# Patient Record
Sex: MALE | Race: WHITE | NOT HISPANIC OR LATINO
[De-identification: names, ages, dates, MRNs, and addresses within clinical notes are randomized per-mention and may not be internally consistent; named-entity substitution may affect disease eponyms.]

---

## 2017-03-04 ENCOUNTER — RX RENEWAL (OUTPATIENT)
Age: 72
End: 2017-03-04

## 2017-04-01 ENCOUNTER — RX RENEWAL (OUTPATIENT)
Age: 72
End: 2017-04-01

## 2017-05-05 ENCOUNTER — RX RENEWAL (OUTPATIENT)
Age: 72
End: 2017-05-05

## 2017-06-21 ENCOUNTER — OUTPATIENT (OUTPATIENT)
Dept: OUTPATIENT SERVICES | Facility: HOSPITAL | Age: 72
LOS: 1 days | End: 2017-06-21
Payer: MEDICARE

## 2017-06-21 ENCOUNTER — APPOINTMENT (OUTPATIENT)
Dept: UROLOGY | Facility: CLINIC | Age: 72
End: 2017-06-21

## 2017-06-21 DIAGNOSIS — R35.0 FREQUENCY OF MICTURITION: ICD-10-CM

## 2017-06-21 LAB
APPEARANCE: CLEAR
BACTERIA: NEGATIVE
BILIRUBIN URINE: NEGATIVE
BLOOD URINE: NEGATIVE
COLOR: YELLOW
GLUCOSE QUALITATIVE U: NORMAL MG/DL
HYALINE CASTS: 0 /LPF
KETONES URINE: NEGATIVE
LEUKOCYTE ESTERASE URINE: NEGATIVE
MICROSCOPIC-UA: NORMAL
NITRITE URINE: NEGATIVE
PH URINE: 6
PROTEIN URINE: NEGATIVE MG/DL
RED BLOOD CELLS URINE: 3 /HPF
SPECIFIC GRAVITY URINE: 1.02
SQUAMOUS EPITHELIAL CELLS: 1 /HPF
UROBILINOGEN URINE: NORMAL MG/DL
WHITE BLOOD CELLS URINE: 1 /HPF

## 2017-06-21 PROCEDURE — 52000 CYSTOURETHROSCOPY: CPT

## 2017-06-22 DIAGNOSIS — N40.1 BENIGN PROSTATIC HYPERPLASIA WITH LOWER URINARY TRACT SYMPTOMS: ICD-10-CM

## 2017-06-22 DIAGNOSIS — C67.9 MALIGNANT NEOPLASM OF BLADDER, UNSPECIFIED: ICD-10-CM

## 2017-06-22 LAB
PSA FREE FLD-MCNC: 16.2
PSA FREE SERPL-MCNC: 0.32 NG/ML
PSA SERPL-MCNC: 1.97 NG/ML

## 2017-06-24 LAB — CORE LAB FLUID CYTOLOGY: NORMAL

## 2018-01-03 ENCOUNTER — APPOINTMENT (OUTPATIENT)
Dept: UROLOGY | Facility: CLINIC | Age: 73
End: 2018-01-03
Payer: MEDICARE

## 2018-01-03 ENCOUNTER — OUTPATIENT (OUTPATIENT)
Dept: OUTPATIENT SERVICES | Facility: HOSPITAL | Age: 73
LOS: 1 days | End: 2018-01-03
Payer: MEDICARE

## 2018-01-03 DIAGNOSIS — R35.0 FREQUENCY OF MICTURITION: ICD-10-CM

## 2018-01-03 LAB
APPEARANCE: CLEAR
BACTERIA: NEGATIVE
BILIRUBIN URINE: NEGATIVE
BLOOD URINE: NEGATIVE
COLOR: YELLOW
GLUCOSE QUALITATIVE U: NEGATIVE MG/DL
HYALINE CASTS: 1 /LPF
KETONES URINE: NEGATIVE
LEUKOCYTE ESTERASE URINE: NEGATIVE
MICROSCOPIC-UA: NORMAL
NITRITE URINE: NEGATIVE
PH URINE: 5.5
PROTEIN URINE: NEGATIVE MG/DL
RED BLOOD CELLS URINE: 2 /HPF
SPECIFIC GRAVITY URINE: 1.02
SQUAMOUS EPITHELIAL CELLS: 2 /HPF
UROBILINOGEN URINE: NEGATIVE MG/DL
WHITE BLOOD CELLS URINE: 1 /HPF

## 2018-01-03 PROCEDURE — 88112 CYTOPATH CELL ENHANCE TECH: CPT | Mod: 26

## 2018-01-03 PROCEDURE — 99213 OFFICE O/P EST LOW 20 MIN: CPT | Mod: 25

## 2018-01-03 PROCEDURE — 52000 CYSTOURETHROSCOPY: CPT

## 2018-01-05 LAB — CORE LAB FLUID CYTOLOGY: NORMAL

## 2018-01-08 DIAGNOSIS — C67.9 MALIGNANT NEOPLASM OF BLADDER, UNSPECIFIED: ICD-10-CM

## 2018-01-08 DIAGNOSIS — N40.1 BENIGN PROSTATIC HYPERPLASIA WITH LOWER URINARY TRACT SYMPTOMS: ICD-10-CM

## 2018-05-31 ENCOUNTER — RX RENEWAL (OUTPATIENT)
Age: 73
End: 2018-05-31

## 2018-06-13 ENCOUNTER — APPOINTMENT (OUTPATIENT)
Dept: UROLOGY | Facility: CLINIC | Age: 73
End: 2018-06-13
Payer: MEDICARE

## 2018-06-13 ENCOUNTER — OUTPATIENT (OUTPATIENT)
Dept: OUTPATIENT SERVICES | Facility: HOSPITAL | Age: 73
LOS: 1 days | End: 2018-06-13
Payer: MEDICARE

## 2018-06-13 VITALS
TEMPERATURE: 97.7 F | DIASTOLIC BLOOD PRESSURE: 49 MMHG | RESPIRATION RATE: 18 BRPM | SYSTOLIC BLOOD PRESSURE: 180 MMHG | HEART RATE: 81 BPM

## 2018-06-13 DIAGNOSIS — Z00.00 ENCOUNTER FOR GENERAL ADULT MEDICAL EXAMINATION W/OUT ABNORMAL FINDINGS: ICD-10-CM

## 2018-06-13 DIAGNOSIS — R35.0 FREQUENCY OF MICTURITION: ICD-10-CM

## 2018-06-13 LAB
APPEARANCE: CLEAR
BACTERIA: NEGATIVE
BILIRUBIN URINE: NEGATIVE
BLOOD URINE: NEGATIVE
COLOR: YELLOW
GLUCOSE QUALITATIVE U: NEGATIVE MG/DL
KETONES URINE: NEGATIVE
LEUKOCYTE ESTERASE URINE: NEGATIVE
MICROSCOPIC-UA: NORMAL
NITRITE URINE: NEGATIVE
PH URINE: 6
PROTEIN URINE: NEGATIVE MG/DL
RED BLOOD CELLS URINE: 1 /HPF
SPECIFIC GRAVITY URINE: 1.02
SQUAMOUS EPITHELIAL CELLS: 1 /HPF
UROBILINOGEN URINE: NEGATIVE MG/DL
WHITE BLOOD CELLS URINE: 2 /HPF

## 2018-06-13 PROCEDURE — 99214 OFFICE O/P EST MOD 30 MIN: CPT | Mod: 25

## 2018-06-13 PROCEDURE — 52000 CYSTOURETHROSCOPY: CPT

## 2018-06-14 LAB
CORE LAB FLUID CYTOLOGY: NORMAL
PSA FREE FLD-MCNC: 14.8
PSA FREE SERPL-MCNC: 0.36 NG/ML
PSA SERPL-MCNC: 2.44 NG/ML

## 2018-06-18 DIAGNOSIS — N40.1 BENIGN PROSTATIC HYPERPLASIA WITH LOWER URINARY TRACT SYMPTOMS: ICD-10-CM

## 2018-06-18 DIAGNOSIS — C67.9 MALIGNANT NEOPLASM OF BLADDER, UNSPECIFIED: ICD-10-CM

## 2018-07-28 ENCOUNTER — RX RENEWAL (OUTPATIENT)
Age: 73
End: 2018-07-28

## 2018-08-02 ENCOUNTER — APPOINTMENT (OUTPATIENT)
Dept: CARDIOLOGY | Facility: CLINIC | Age: 73
End: 2018-08-02
Payer: MEDICARE

## 2018-08-02 ENCOUNTER — NON-APPOINTMENT (OUTPATIENT)
Age: 73
End: 2018-08-02

## 2018-08-02 VITALS
DIASTOLIC BLOOD PRESSURE: 60 MMHG | HEIGHT: 68 IN | SYSTOLIC BLOOD PRESSURE: 144 MMHG | WEIGHT: 222 LBS | BODY MASS INDEX: 33.65 KG/M2 | TEMPERATURE: 97.9 F | OXYGEN SATURATION: 95 % | HEART RATE: 67 BPM | RESPIRATION RATE: 14 BRPM

## 2018-08-02 DIAGNOSIS — R06.09 OTHER FORMS OF DYSPNEA: ICD-10-CM

## 2018-08-02 PROCEDURE — 99214 OFFICE O/P EST MOD 30 MIN: CPT | Mod: 25

## 2018-08-02 PROCEDURE — 93000 ELECTROCARDIOGRAM COMPLETE: CPT

## 2018-08-06 ENCOUNTER — APPOINTMENT (OUTPATIENT)
Dept: CARDIOLOGY | Facility: CLINIC | Age: 73
End: 2018-08-06

## 2018-08-17 ENCOUNTER — NON-APPOINTMENT (OUTPATIENT)
Age: 73
End: 2018-08-17

## 2018-09-26 ENCOUNTER — APPOINTMENT (OUTPATIENT)
Dept: CARDIOLOGY | Facility: CLINIC | Age: 73
End: 2018-09-26

## 2018-09-29 ENCOUNTER — RX RENEWAL (OUTPATIENT)
Age: 73
End: 2018-09-29

## 2018-12-12 ENCOUNTER — APPOINTMENT (OUTPATIENT)
Dept: UROLOGY | Facility: CLINIC | Age: 73
End: 2018-12-12
Payer: MEDICARE

## 2018-12-12 ENCOUNTER — OUTPATIENT (OUTPATIENT)
Dept: OUTPATIENT SERVICES | Facility: HOSPITAL | Age: 73
LOS: 1 days | End: 2018-12-12
Payer: MEDICARE

## 2018-12-12 DIAGNOSIS — R35.0 FREQUENCY OF MICTURITION: ICD-10-CM

## 2018-12-12 PROCEDURE — 99213 OFFICE O/P EST LOW 20 MIN: CPT | Mod: 25

## 2018-12-12 PROCEDURE — 52000 CYSTOURETHROSCOPY: CPT

## 2018-12-13 DIAGNOSIS — C67.9 MALIGNANT NEOPLASM OF BLADDER, UNSPECIFIED: ICD-10-CM

## 2018-12-13 DIAGNOSIS — N40.1 BENIGN PROSTATIC HYPERPLASIA WITH LOWER URINARY TRACT SYMPTOMS: ICD-10-CM

## 2018-12-13 LAB
APPEARANCE: CLEAR
BACTERIA: NEGATIVE
BILIRUBIN URINE: NEGATIVE
BLOOD URINE: NEGATIVE
COLOR: YELLOW
GLUCOSE QUALITATIVE U: NEGATIVE MG/DL
HYALINE CASTS: 0 /LPF
KETONES URINE: NEGATIVE
LEUKOCYTE ESTERASE URINE: NEGATIVE
MICROSCOPIC-UA: NORMAL
NITRITE URINE: NEGATIVE
PH URINE: 6
PROTEIN URINE: NEGATIVE MG/DL
RED BLOOD CELLS URINE: 2 /HPF
SPECIFIC GRAVITY URINE: 1.02
SQUAMOUS EPITHELIAL CELLS: 0 /HPF
UROBILINOGEN URINE: NEGATIVE MG/DL
WHITE BLOOD CELLS URINE: 1 /HPF

## 2018-12-20 LAB — CORE LAB FLUID CYTOLOGY: NORMAL

## 2019-05-10 ENCOUNTER — RX RENEWAL (OUTPATIENT)
Age: 74
End: 2019-05-10

## 2019-05-29 ENCOUNTER — EMERGENCY (EMERGENCY)
Facility: HOSPITAL | Age: 74
LOS: 1 days | Discharge: HOME | End: 2019-05-29
Attending: EMERGENCY MEDICINE
Payer: MEDICARE

## 2019-05-29 VITALS
RESPIRATION RATE: 18 BRPM | HEART RATE: 87 BPM | OXYGEN SATURATION: 100 % | SYSTOLIC BLOOD PRESSURE: 122 MMHG | DIASTOLIC BLOOD PRESSURE: 67 MMHG

## 2019-05-29 VITALS
SYSTOLIC BLOOD PRESSURE: 167 MMHG | HEIGHT: 68 IN | DIASTOLIC BLOOD PRESSURE: 77 MMHG | HEART RATE: 93 BPM | OXYGEN SATURATION: 100 % | TEMPERATURE: 98 F | WEIGHT: 220.02 LBS | RESPIRATION RATE: 18 BRPM

## 2019-05-29 LAB
ALBUMIN SERPL ELPH-MCNC: 4.7 G/DL — SIGNIFICANT CHANGE UP (ref 3.3–5)
ALP SERPL-CCNC: 101 U/L — SIGNIFICANT CHANGE UP (ref 40–120)
ALT FLD-CCNC: 36 U/L — SIGNIFICANT CHANGE UP (ref 10–45)
ANION GAP SERPL CALC-SCNC: 13 MMOL/L — SIGNIFICANT CHANGE UP (ref 5–17)
APTT BLD: 26.8 SEC — LOW (ref 27.5–36.3)
AST SERPL-CCNC: 30 U/L — SIGNIFICANT CHANGE UP (ref 10–40)
BASOPHILS # BLD AUTO: 0 K/UL — SIGNIFICANT CHANGE UP (ref 0–0.2)
BASOPHILS NFR BLD AUTO: 0.1 % — SIGNIFICANT CHANGE UP (ref 0–2)
BILIRUB SERPL-MCNC: 0.6 MG/DL — SIGNIFICANT CHANGE UP (ref 0.2–1.2)
BUN SERPL-MCNC: 15 MG/DL — SIGNIFICANT CHANGE UP (ref 7–23)
CALCIUM SERPL-MCNC: 9.9 MG/DL — SIGNIFICANT CHANGE UP (ref 8.4–10.5)
CHLORIDE SERPL-SCNC: 98 MMOL/L — SIGNIFICANT CHANGE UP (ref 96–108)
CO2 SERPL-SCNC: 26 MMOL/L — SIGNIFICANT CHANGE UP (ref 22–31)
CREAT SERPL-MCNC: 1.14 MG/DL — SIGNIFICANT CHANGE UP (ref 0.5–1.3)
EOSINOPHIL # BLD AUTO: 0.2 K/UL — SIGNIFICANT CHANGE UP (ref 0–0.5)
EOSINOPHIL NFR BLD AUTO: 2.4 % — SIGNIFICANT CHANGE UP (ref 0–6)
GLUCOSE SERPL-MCNC: 99 MG/DL — SIGNIFICANT CHANGE UP (ref 70–99)
HCT VFR BLD CALC: 48.5 % — SIGNIFICANT CHANGE UP (ref 39–50)
HGB BLD-MCNC: 15.8 G/DL — SIGNIFICANT CHANGE UP (ref 13–17)
INR BLD: 1.05 RATIO — SIGNIFICANT CHANGE UP (ref 0.88–1.16)
LYMPHOCYTES # BLD AUTO: 4 K/UL — HIGH (ref 1–3.3)
LYMPHOCYTES # BLD AUTO: 41.4 % — SIGNIFICANT CHANGE UP (ref 13–44)
MCHC RBC-ENTMCNC: 30.2 PG — SIGNIFICANT CHANGE UP (ref 27–34)
MCHC RBC-ENTMCNC: 32.5 GM/DL — SIGNIFICANT CHANGE UP (ref 32–36)
MCV RBC AUTO: 92.8 FL — SIGNIFICANT CHANGE UP (ref 80–100)
MONOCYTES # BLD AUTO: 0.7 K/UL — SIGNIFICANT CHANGE UP (ref 0–0.9)
MONOCYTES NFR BLD AUTO: 6.9 % — SIGNIFICANT CHANGE UP (ref 2–14)
NEUTROPHILS # BLD AUTO: 4.8 K/UL — SIGNIFICANT CHANGE UP (ref 1.8–7.4)
NEUTROPHILS NFR BLD AUTO: 49.2 % — SIGNIFICANT CHANGE UP (ref 43–77)
PLATELET # BLD AUTO: 279 K/UL — SIGNIFICANT CHANGE UP (ref 150–400)
POTASSIUM SERPL-MCNC: 4 MMOL/L — SIGNIFICANT CHANGE UP (ref 3.5–5.3)
POTASSIUM SERPL-SCNC: 4 MMOL/L — SIGNIFICANT CHANGE UP (ref 3.5–5.3)
PROT SERPL-MCNC: 7.9 G/DL — SIGNIFICANT CHANGE UP (ref 6–8.3)
PROTHROM AB SERPL-ACNC: 12 SEC — SIGNIFICANT CHANGE UP (ref 10–12.9)
RBC # BLD: 5.22 M/UL — SIGNIFICANT CHANGE UP (ref 4.2–5.8)
RBC # FLD: 12.3 % — SIGNIFICANT CHANGE UP (ref 10.3–14.5)
SODIUM SERPL-SCNC: 137 MMOL/L — SIGNIFICANT CHANGE UP (ref 135–145)
WBC # BLD: 9.7 K/UL — SIGNIFICANT CHANGE UP (ref 3.8–10.5)
WBC # FLD AUTO: 9.7 K/UL — SIGNIFICANT CHANGE UP (ref 3.8–10.5)

## 2019-05-29 PROCEDURE — 99284 EMERGENCY DEPT VISIT MOD MDM: CPT

## 2019-05-29 NOTE — ED PROVIDER NOTE - ATTENDING CONTRIBUTION TO CARE
I have seen and evaluated this patient with the resident.   I agree with the findings  unless other wise stated.  I have made appropriate changes in documentations where needed, After my face to face bedside evaluation, I am further  notinyo M with PMH Bladder CA (>10 years), hemochromatosis, takes 324mg ASA for carotid artery disease presenting with episode of confusion and word finding difficulty 4 days ago. Pt reports on  night he couldn't figure out how to take off his pants, then proceeded to wake wife up and couldn't find words to tell her. Wife at bedside reports patient with jumbled speech (15 minutes episode) at this time. No associated diaphoresis, CP, trouble breathing, dizziness, n/v.   Pt went to [facility] that night, had labs and imaging and signed out AMA on Monday. Denies any new episodes since leaving hospital. Wife reports patient is acting himself but "calmer." Pt reports he has f/u with cards and neuro tomorrow, however was told by other people he should get checked so came to ED. Denies any fever/chills, CP, SOB, abd pain, n/v/d, urinary symptoms, vision changes, HA, trouble walking, numbness/tingling.  Detailed exam non focal neuro exam except some delay in response to find certain words that is stable for days No carotid bruit sensory intact Heart regular lungs clear Pts eval done at other hospital with CT scans carotid dopplers labs re viewed pt on daily 325 mg aspirin Dr Victor covering for Dr Sanford contacted has appointment to see pt at 11 am tomorrow detailed return instructions d/w pt and his spouse they understand -- Jerson I have seen and evaluated this patient with theACP   I agree with the findings  unless other wise stated.  I have made appropriate changes in documentations where needed, After my face to face bedside evaluation, I am further  notinyo M with PMH Bladder CA (>10 years), hemochromatosis, takes 324mg ASA for carotid artery disease presenting with episode of confusion and word finding difficulty 4 days ago. Pt reports on  night he couldn't figure out how to take off his pants, then proceeded to wake wife up and couldn't find words to tell her. Wife at bedside reports patient with jumbled speech (15 minutes episode) at this time. No associated diaphoresis, CP, trouble breathing, dizziness, n/v.   Pt went to [facility] that night, had labs and imaging and signed out AMA on Monday. Denies any new episodes since leaving hospital. Wife reports patient is acting himself but "calmer." Pt reports he has f/u with cards and neuro tomorrow, however was told by other people he should get checked so came to ED. Denies any fever/chills, CP, SOB, abd pain, n/v/d, urinary symptoms, vision changes, HA, trouble walking, numbness/tingling.  Detailed exam non focal neuro exam except some delay in response to find certain words that is stable for days No carotid bruit sensory intact Heart regular lungs clear Pts eval done at other hospital with CT scans carotid dopplers labs re viewed pt on daily 325 mg aspirin Dr Victor covering for Dr Sanford contacted has appointment to see pt at 11 am tomorrow detailed return instructions d/w pt and his spouse they understand -- Jerson

## 2019-05-29 NOTE — ED PROVIDER NOTE - PMH
Ex-heavy cigarette smoker (20-39 per day)    Hyperlipemia    Hypertension    Malignant neoplasm of bladder wall    Osteoarthritis

## 2019-05-29 NOTE — ED PROVIDER NOTE - CARE PROVIDER_API CALL
Deepak Sanford)  Neurology  [address], [address]  [place] [address]  Phone: [phone]  Fax: [phone]  Follow Up Time:     Pattie Haile)  Cardiovascular Disease; Interventional Cardiology  [address] [place] [address]  Phone: [phone]  Fax: [phone]  Follow Up Time: 1-3 Days

## 2019-05-29 NOTE — ED PROVIDER NOTE - PROGRESS NOTE DETAILS
Dr. Juvenal ron. - Siobhan Chavez PA-C Dr. Arthur spoke with Dr. Victor, covering for Dr. Sanford. Reviewed patient's history and results from OSH (reports provided by patient) and confirmed appointment with Dr. Sanford at 11am tomorrow. Will f/u with patient in office. - Siobhan Chavez PA-C

## 2019-05-29 NOTE — ED PROVIDER NOTE - OBJECTIVE STATEMENT
73yo M with PMH Bladder CA (>10 years), hemochromatosis, takes 324mg ASA for carotid artery disease presenting with episode of confusion and word finding difficulty 4 days ago. Pt reports on Sunday night he couldn't figure out how to take off his pants, then proceeded to wake wife up and couldn't find words to tell her. Wife at bedside reports patient with jumbled speech (15 minutes episode) at this time. No associated diaphoresis, CP, trouble breathing, dizziness, n/v.   Pt went to [facility] that night, had labs and imaging and signed out AMA on Monday. Denies any new episodes since leaving hospital. Wife reports patient is acting himself but "calmer." Pt reports he has f/u with cards and neuro tomorrow, however was told by other people he should get checked so came to ED. Denies any fever/chills, CP, SOB, abd pain, n/v/d, urinary symptoms, vision changes, HA, trouble walking, numbness/tingling.     Mak Acosta (PCP)   Dr. Haile (cardiology)  Deepak Sanford (Neuro)

## 2019-05-29 NOTE — ED ADULT NURSE REASSESSMENT NOTE - NS ED NURSE REASSESS COMMENT FT1
pt seen and evaluated by [facility] TAYLOR Curry. labs drawn and sent to the lab. 20G IV placed in R AC

## 2019-05-29 NOTE — ED PROVIDER NOTE - CLINICAL SUMMARY MEDICAL DECISION MAKING FREE TEXT BOX
73yo M with PMH Bladder CA (>10 years), hemochromatosis, takes 324mg ASA for carotid artery disease presenting with episode of confusion and word finding difficulty 4 days ago. Pt reports on Sunday night he couldn't figure out how to take off his pants, then proceeded to wake wife up and couldn't find words to tell her. Wife at bedside reports patient with jumbled speech (15 minutes episode) at this time. No associated diaphoresis, CP, trouble breathing, dizziness, n/v.   Pt went to [facility] that night, had labs and imaging and signed out AMA on Monday. Denies any new episodes since leaving hospital. Wife reports patient is acting himself but "calmer." Pt reports he has f/u with cards and neuro tomorrow, however was told by other people he should get checked so came to ED. Denies any fever/chills, CP, SOB, abd pain, n/v/d, urinary symptoms, vision changes, HA, trouble walking, numbness/tingling.  Detailed exam non focal neuro exam except some delay in response to find certain words that is stable for days No carotid bruit sensory intact Heart regular lungs clear Pts eval done at other hospital with CT scans carotid dopplers labs re viewed pt on daily 325 mg aspirin Dr Victor covering for Dr Sanford contacted has appointment to see pt at 11 am tomorrow detailed return instructions d/w pt and his spouse they understand -- Jerson

## 2019-05-29 NOTE — ED STATDOCS - OBJECTIVE STATEMENT
PMH Bladder CA (15 years), Hemochromatosis, on 324 Asprin presents to ED with increased confusion and word finding difficulty.   On Sunday night patient couldn't put on/take off clothing and had increased confusion. Additionally he couldn't find words with jumbled speech (15 minutes episode) on Sunday night. He went to the hospital that night and signed out AMA on Monday evening at [facility]. Since leaving hospital has been increasingly confused and losing words.  Denies trouble walking, denies weakness.   Mak Acosta (PCP)   Dr. Haile (cardiology)

## 2019-05-29 NOTE — ED ADULT NURSE NOTE - OBJECTIVE STATEMENT
73yo M with PMH Bladder CA (>10 years), hemochromatosis, takes 324mg ASA for carotid artery disease presenting with episode of confusion and word finding difficulty 4 days ago. Pt reports on Sunday night he couldn't figure out how to take off his pants, then proceeded to wake wife up and couldn't find words to tell her. Wife at bedside reports patient with jumbled speech (15 minutes episode) at this time. No associated diaphoresis, CP, trouble breathing, dizziness, n/v.   Pt went to [facility] that night, had labs and imaging and signed out AMA on Monday. Denies any new episodes since leaving hospital. Wife reports patient is acting himself but "calmer." Pt reports he has f/u with cards and neuro tomorrow, however was told by other people he should get checked so came to ED. Denies any fever/chills, CP, SOB, abd pain, n/v/d, urinary symptoms, vision changes, HA, trouble walking, numbness/tingling.     Mak Acosta (PCP)   Dr. Haile (cardiology)  Deepak Sanford (Neuro)

## 2019-05-29 NOTE — ED PROVIDER NOTE - NSFOLLOWUPINSTRUCTIONS_ED_ALL_ED_FT
Continue your current medication regimen.    Follow up with Cardiologist Dr. Haile, and Neurologist Dr. Sanford (11AM) at appointments tomorrow.     Follow up with your Primary Care Provider upon discharge.     Bring a copy of your test results (attached along with your discharge paperwork) with you to your appointment for further discussion, evaluation, and comparison with your prior results.    Please return to the Emergency Department immediately for any new, worsening, or concerning symptoms including increased/new confusion, dizziness, vision changes, vomiting, numbness, tingling, weakness, unsteady gait, trouble speaking, or any other concerns.

## 2019-05-30 ENCOUNTER — NON-APPOINTMENT (OUTPATIENT)
Age: 74
End: 2019-05-30

## 2019-05-30 ENCOUNTER — APPOINTMENT (OUTPATIENT)
Dept: CARDIOLOGY | Facility: CLINIC | Age: 74
End: 2019-05-30
Payer: MEDICARE

## 2019-05-30 ENCOUNTER — INPATIENT (INPATIENT)
Facility: HOSPITAL | Age: 74
LOS: 4 days | Discharge: HOME | DRG: 36 | End: 2019-06-04
Attending: PSYCHIATRY & NEUROLOGY | Admitting: PSYCHIATRY & NEUROLOGY
Payer: MEDICARE

## 2019-05-30 VITALS
TEMPERATURE: 98 F | RESPIRATION RATE: 18 BRPM | WEIGHT: 212.08 LBS | OXYGEN SATURATION: 98 % | SYSTOLIC BLOOD PRESSURE: 133 MMHG | HEIGHT: 68 IN | DIASTOLIC BLOOD PRESSURE: 69 MMHG | HEART RATE: 74 BPM

## 2019-05-30 VITALS
HEART RATE: 79 BPM | DIASTOLIC BLOOD PRESSURE: 63 MMHG | HEIGHT: 68 IN | WEIGHT: 212 LBS | BODY MASS INDEX: 32.13 KG/M2 | SYSTOLIC BLOOD PRESSURE: 115 MMHG | OXYGEN SATURATION: 97 %

## 2019-05-30 LAB
BASOPHILS # BLD AUTO: 0 K/UL — SIGNIFICANT CHANGE UP (ref 0–0.2)
BASOPHILS NFR BLD AUTO: 0.2 % — SIGNIFICANT CHANGE UP (ref 0–2)
EOSINOPHIL # BLD AUTO: 0.3 K/UL — SIGNIFICANT CHANGE UP (ref 0–0.5)
EOSINOPHIL NFR BLD AUTO: 3 % — SIGNIFICANT CHANGE UP (ref 0–6)
GAS PNL BLDV: SIGNIFICANT CHANGE UP
HCT VFR BLD CALC: 44 % — SIGNIFICANT CHANGE UP (ref 39–50)
HGB BLD-MCNC: 14.8 G/DL — SIGNIFICANT CHANGE UP (ref 13–17)
LYMPHOCYTES # BLD AUTO: 3.5 K/UL — HIGH (ref 1–3.3)
LYMPHOCYTES # BLD AUTO: 38.9 % — SIGNIFICANT CHANGE UP (ref 13–44)
MCHC RBC-ENTMCNC: 31.2 PG — SIGNIFICANT CHANGE UP (ref 27–34)
MCHC RBC-ENTMCNC: 33.7 GM/DL — SIGNIFICANT CHANGE UP (ref 32–36)
MCV RBC AUTO: 92.6 FL — SIGNIFICANT CHANGE UP (ref 80–100)
MONOCYTES # BLD AUTO: 0.7 K/UL — SIGNIFICANT CHANGE UP (ref 0–0.9)
MONOCYTES NFR BLD AUTO: 7.8 % — SIGNIFICANT CHANGE UP (ref 2–14)
NEUTROPHILS # BLD AUTO: 4.5 K/UL — SIGNIFICANT CHANGE UP (ref 1.8–7.4)
NEUTROPHILS NFR BLD AUTO: 50.1 % — SIGNIFICANT CHANGE UP (ref 43–77)
PLATELET # BLD AUTO: 252 K/UL — SIGNIFICANT CHANGE UP (ref 150–400)
RBC # BLD: 4.75 M/UL — SIGNIFICANT CHANGE UP (ref 4.2–5.8)
RBC # FLD: 12.2 % — SIGNIFICANT CHANGE UP (ref 10.3–14.5)
WBC # BLD: 9 K/UL — SIGNIFICANT CHANGE UP (ref 3.8–10.5)
WBC # FLD AUTO: 9 K/UL — SIGNIFICANT CHANGE UP (ref 3.8–10.5)

## 2019-05-30 PROCEDURE — 93000 ELECTROCARDIOGRAM COMPLETE: CPT | Mod: PD

## 2019-05-30 PROCEDURE — 99215 OFFICE O/P EST HI 40 MIN: CPT | Mod: PD

## 2019-05-30 PROCEDURE — 93010 ELECTROCARDIOGRAM REPORT: CPT

## 2019-05-30 PROCEDURE — 99285 EMERGENCY DEPT VISIT HI MDM: CPT | Mod: GC,25

## 2019-05-30 NOTE — ED PROVIDER NOTE - PHYSICAL EXAMINATION
PHYSICAL EXAM:   General: well-appearing, appears stated age, in no acute distress  HEENT: NC/AT, PERRLA, conjunctiva WNL  Cardiovascular: regular rate and rhythm, + S1/S2, no murmurs, rubs, gallops appreciated  Respiratory: clear to auscultation bilaterally, good aeration bilaterally, nonlabored respirations  Abdominal: soft, nontender, nondistended, no rebound, guarding or rigidity,   Neuro: Alert and oriented x3. CN2-12 grossly intact, Strength 5/5 in upper and lower extremities. Sensation intact to light touch in upper and lower extremities.  Gait WNL, Repetition in tact, word finding difficulties when trying to tell a story  Psychiatric: appropriate mood and affect.   Skin: appropriate color, warm, dry.  -Nancy Siddiqui PGY-1

## 2019-05-30 NOTE — ED PROVIDER NOTE - PSH
cardiac cath > 7 yrs. ago-neg-  at [facility]    H/O arthroscopy of knee- 2009    History of cystoscopy w/biopsy X 2 in 2004

## 2019-05-30 NOTE — REASON FOR VISIT
[Follow-Up - From Hospitalization] : follow-up of a recent hospitalization for [FreeTextEntry1] : TIA

## 2019-05-30 NOTE — DISCUSSION/SUMMARY
[FreeTextEntry1] : The patient is a 74-year-old gentleman history of bladder cancer, carotid artery stenosis. hypertension, hyperlipidemia s/p TIA while off aspirin.\par #1 ALEX- on aspirin, neuro today, consider LICA CEA\par #2 Htn- controlled on valsartan/hctz\par #3 Lipids- change to atorvastatin 80mg\par #4 - s/p hematuria episode, f/u Dr. Mann

## 2019-05-30 NOTE — PHYSICAL EXAM
[General Appearance - Well Developed] : well developed [Normal Appearance] : normal appearance [Well Groomed] : well groomed [General Appearance - Well Nourished] : well nourished [No Deformities] : no deformities [General Appearance - In No Acute Distress] : no acute distress [Normal Conjunctiva] : the conjunctiva exhibited no abnormalities [Eyelids - No Xanthelasma] : the eyelids demonstrated no xanthelasmas [Normal Oral Mucosa] : normal oral mucosa [No Oral Pallor] : no oral pallor [No Oral Cyanosis] : no oral cyanosis [Normal Jugular Venous A Waves Present] : normal jugular venous A waves present [Normal Jugular Venous V Waves Present] : normal jugular venous V waves present [No Jugular Venous [place] A Waves] : no jugular venous de [place] paz A waves [Respiration, Rhythm And Depth] : normal respiratory rhythm and effort [Exaggerated Use Of Accessory Muscles For Inspiration] : no accessory muscle use [Auscultation Breath Sounds / Voice Sounds] : lungs were clear to auscultation bilaterally [Heart Rate And Rhythm] : heart rate and rhythm were normal [Heart Sounds] : normal S1 and S2 [Murmurs] : no murmurs present [Abdomen Soft] : soft [Abdomen Tenderness] : non-tender [Abdomen Mass (___ Cm)] : no abdominal mass palpated [Abnormal Walk] : normal gait [Gait - Sufficient For Exercise Testing] : the gait was sufficient for exercise testing [Nail Clubbing] : no clubbing of the fingernails [Cyanosis, Localized] : no localized cyanosis [Petechial Hemorrhages (___cm)] : no petechial hemorrhages [Skin Color & Pigmentation] : normal skin color and pigmentation [] : no rash [No Venous Stasis] : no venous stasis [Skin Lesions] : no skin lesions [No Skin Ulcers] : no skin ulcer [No Xanthoma] : no  xanthoma was observed [Oriented To Time, Place, And Person] : oriented to person, place, and time [Affect] : the affect was normal [Mood] : the mood was normal [No Anxiety] : not feeling anxious

## 2019-05-30 NOTE — ED PROVIDER NOTE - PMH
Ex-heavy cigarette smoker (20-39 per day)    Hemochromatosis    Hyperlipemia    Hypertension    Malignant neoplasm of bladder wall    Osteoarthritis

## 2019-05-30 NOTE — HISTORY OF PRESENT ILLNESS
[FreeTextEntry1] : Mak  had an episode of word salad for 10 minutes this past SUnday night. He went to ER. He went to [facility] in [place]. Carotid doppler, CT scan and ECHO. He now feels tired. He continues to lose words. He stopped aspirin for two weeks because of hematuria. He restarted the aspirin last night.

## 2019-05-30 NOTE — ED PROVIDER NOTE - CLINICAL SUMMARY MEDICAL DECISION MAKING FREE TEXT BOX
Nancy Siddiqui MD PGY-1 pt is a 73 yo M with PMH HTN, HLD, bladder CA (in remission), hemochromatosis sent in by Dr. Sanford (neurologist) for admission to stroke service for acute infarct and critical L ICA stenosis seen on MRI obtained outpatient today. neuro nonfocal but with word finding difficulties when trying to tell a story. spoke with neuro service, will get labs, ct/cta, and admit to stroke servce

## 2019-05-30 NOTE — REVIEW OF SYSTEMS
[Shortness Of Breath] : shortness of breath [Chest Pain] : chest pain [Negative] : Heme/Lymph [Dyspnea on exertion] : not dyspnea during exertion [Lower Ext Edema] : no extremity edema [Palpitations] : no palpitations

## 2019-05-30 NOTE — ED PROVIDER NOTE - OBJECTIVE STATEMENT
Nancy Siddiqui MD PGY-1 pt is a 73 yo M with PMH HTN, HLD, bladder CA (in remission), hemochromatosis sent in by Dr. Sanford (neurologist) for admission to stroke service for acute infarct and critical L ICA stenosis seen on MRI obtained outpatient today. On Sunday, pt was confused - could not figure out how to take off his pants or speak to his wife. Went to [facility], reportedly had labs and imaging and isgned out AMA. was told to come back to ED by children, and so was seen in the ED 5/29, discharged home to follow up with Dr. Sanford next day. Saw Dr. Sanford, had MRI and was told to come to the ED for aforementioned findings. States he still feels confused. Nancy Siddiqui MD PGY-1 pt is a 75 yo M with PMH HTN, HLD, bladder CA (in remission), hemochromatosis sent in by Dr. Sanford (neurologist) for admission to stroke service for acute infarct and critical L ICA stenosis seen on MRI obtained outpatient today. On , pt was confused - could not figure out how to take off his pants or speak to his wife. Went to [facility], reportedly had labs and imaging and isgned out AMA. was told to come back to ED by children, and so was seen in the ED , discharged home to follow up with Dr. Sanford next day. Saw Dr. Sanford, had MRI and was told to come to the ED for aforementioned findings. States he still feels confused.    Attendinyo male presents with aphasia and confusion since .  pt seen by outpatient neurologist and diagnosed with acute CVA.

## 2019-05-31 DIAGNOSIS — I63.9 CEREBRAL INFARCTION, UNSPECIFIED: ICD-10-CM

## 2019-05-31 DIAGNOSIS — I65.29 OCCLUSION AND STENOSIS OF UNSPECIFIED CAROTID ARTERY: ICD-10-CM

## 2019-05-31 LAB
ALBUMIN SERPL ELPH-MCNC: 4 G/DL — SIGNIFICANT CHANGE UP (ref 3.3–5)
ALBUMIN SERPL ELPH-MCNC: 4.3 G/DL — SIGNIFICANT CHANGE UP (ref 3.3–5)
ALP SERPL-CCNC: 85 U/L — SIGNIFICANT CHANGE UP (ref 40–120)
ALP SERPL-CCNC: 88 U/L — SIGNIFICANT CHANGE UP (ref 40–120)
ALT FLD-CCNC: 26 U/L — SIGNIFICANT CHANGE UP (ref 10–45)
ALT FLD-CCNC: 26 U/L — SIGNIFICANT CHANGE UP (ref 10–45)
ANION GAP SERPL CALC-SCNC: 12 MMOL/L — SIGNIFICANT CHANGE UP (ref 5–17)
ANION GAP SERPL CALC-SCNC: 13 MMOL/L — SIGNIFICANT CHANGE UP (ref 5–17)
AST SERPL-CCNC: 22 U/L — SIGNIFICANT CHANGE UP (ref 10–40)
AST SERPL-CCNC: 22 U/L — SIGNIFICANT CHANGE UP (ref 10–40)
BASOPHILS # BLD AUTO: 0 K/UL — SIGNIFICANT CHANGE UP (ref 0–0.2)
BASOPHILS NFR BLD AUTO: 0.2 % — SIGNIFICANT CHANGE UP (ref 0–2)
BILIRUB SERPL-MCNC: 0.5 MG/DL — SIGNIFICANT CHANGE UP (ref 0.2–1.2)
BILIRUB SERPL-MCNC: 0.5 MG/DL — SIGNIFICANT CHANGE UP (ref 0.2–1.2)
BUN SERPL-MCNC: 22 MG/DL — SIGNIFICANT CHANGE UP (ref 7–23)
BUN SERPL-MCNC: 27 MG/DL — HIGH (ref 7–23)
CALCIUM SERPL-MCNC: 9.6 MG/DL — SIGNIFICANT CHANGE UP (ref 8.4–10.5)
CALCIUM SERPL-MCNC: 9.9 MG/DL — SIGNIFICANT CHANGE UP (ref 8.4–10.5)
CHLORIDE SERPL-SCNC: 99 MMOL/L — SIGNIFICANT CHANGE UP (ref 96–108)
CHLORIDE SERPL-SCNC: 99 MMOL/L — SIGNIFICANT CHANGE UP (ref 96–108)
CHOLEST SERPL-MCNC: 135 MG/DL — SIGNIFICANT CHANGE UP (ref 10–199)
CO2 SERPL-SCNC: 25 MMOL/L — SIGNIFICANT CHANGE UP (ref 22–31)
CO2 SERPL-SCNC: 26 MMOL/L — SIGNIFICANT CHANGE UP (ref 22–31)
CREAT SERPL-MCNC: 1.16 MG/DL — SIGNIFICANT CHANGE UP (ref 0.5–1.3)
CREAT SERPL-MCNC: 1.53 MG/DL — HIGH (ref 0.5–1.3)
EOSINOPHIL # BLD AUTO: 0.3 K/UL — SIGNIFICANT CHANGE UP (ref 0–0.5)
EOSINOPHIL NFR BLD AUTO: 4 % — SIGNIFICANT CHANGE UP (ref 0–6)
ESTIMATED AVERAGE GLUCOSE: 120 MG/DL — HIGH (ref 68–114)
GLUCOSE SERPL-MCNC: 108 MG/DL — HIGH (ref 70–99)
GLUCOSE SERPL-MCNC: 97 MG/DL — SIGNIFICANT CHANGE UP (ref 70–99)
HBA1C BLD-MCNC: 5.8 % — HIGH (ref 4–5.6)
HBA1C BLD-MCNC: 5.8 % — HIGH (ref 4–5.6)
HCT VFR BLD CALC: 43.3 % — SIGNIFICANT CHANGE UP (ref 39–50)
HDLC SERPL-MCNC: 42 MG/DL — SIGNIFICANT CHANGE UP
HGB BLD-MCNC: 14.5 G/DL — SIGNIFICANT CHANGE UP (ref 13–17)
LIPID PNL WITH DIRECT LDL SERPL: 76 MG/DL — SIGNIFICANT CHANGE UP
LYMPHOCYTES # BLD AUTO: 2.7 K/UL — SIGNIFICANT CHANGE UP (ref 1–3.3)
LYMPHOCYTES # BLD AUTO: 36 % — SIGNIFICANT CHANGE UP (ref 13–44)
MCHC RBC-ENTMCNC: 31 PG — SIGNIFICANT CHANGE UP (ref 27–34)
MCHC RBC-ENTMCNC: 33.5 GM/DL — SIGNIFICANT CHANGE UP (ref 32–36)
MCV RBC AUTO: 92.7 FL — SIGNIFICANT CHANGE UP (ref 80–100)
MONOCYTES # BLD AUTO: 0.7 K/UL — SIGNIFICANT CHANGE UP (ref 0–0.9)
MONOCYTES NFR BLD AUTO: 9.1 % — SIGNIFICANT CHANGE UP (ref 2–14)
NEUTROPHILS # BLD AUTO: 3.8 K/UL — SIGNIFICANT CHANGE UP (ref 1.8–7.4)
NEUTROPHILS NFR BLD AUTO: 50.6 % — SIGNIFICANT CHANGE UP (ref 43–77)
PLATELET # BLD AUTO: 208 K/UL — SIGNIFICANT CHANGE UP (ref 150–400)
POTASSIUM SERPL-MCNC: 4 MMOL/L — SIGNIFICANT CHANGE UP (ref 3.5–5.3)
POTASSIUM SERPL-MCNC: 4.1 MMOL/L — SIGNIFICANT CHANGE UP (ref 3.5–5.3)
POTASSIUM SERPL-SCNC: 4 MMOL/L — SIGNIFICANT CHANGE UP (ref 3.5–5.3)
POTASSIUM SERPL-SCNC: 4.1 MMOL/L — SIGNIFICANT CHANGE UP (ref 3.5–5.3)
PROT SERPL-MCNC: 6.7 G/DL — SIGNIFICANT CHANGE UP (ref 6–8.3)
PROT SERPL-MCNC: 7.2 G/DL — SIGNIFICANT CHANGE UP (ref 6–8.3)
RBC # BLD: 4.67 M/UL — SIGNIFICANT CHANGE UP (ref 4.2–5.8)
RBC # FLD: 12.1 % — SIGNIFICANT CHANGE UP (ref 10.3–14.5)
SODIUM SERPL-SCNC: 137 MMOL/L — SIGNIFICANT CHANGE UP (ref 135–145)
SODIUM SERPL-SCNC: 137 MMOL/L — SIGNIFICANT CHANGE UP (ref 135–145)
TOTAL CHOLESTEROL/HDL RATIO MEASUREMENT: 3.2 RATIO — LOW (ref 3.4–9.6)
TRIGL SERPL-MCNC: 85 MG/DL — SIGNIFICANT CHANGE UP (ref 10–149)
WBC # BLD: 7.4 K/UL — SIGNIFICANT CHANGE UP (ref 3.8–10.5)
WBC # FLD AUTO: 7.4 K/UL — SIGNIFICANT CHANGE UP (ref 3.8–10.5)

## 2019-05-31 PROCEDURE — 70498 CT ANGIOGRAPHY NECK: CPT | Mod: 26

## 2019-05-31 PROCEDURE — 70496 CT ANGIOGRAPHY HEAD: CPT | Mod: 26

## 2019-05-31 PROCEDURE — 93880 EXTRACRANIAL BILAT STUDY: CPT | Mod: 26

## 2019-05-31 PROCEDURE — 99223 1ST HOSP IP/OBS HIGH 75: CPT

## 2019-05-31 RX ORDER — ASPIRIN/CALCIUM CARB/MAGNESIUM 324 MG
325 TABLET ORAL DAILY
Refills: 0 | Status: DISCONTINUED | OUTPATIENT
Start: 2019-05-31 | End: 2019-06-04

## 2019-05-31 RX ORDER — CLOPIDOGREL BISULFATE 75 MG/1
75 TABLET, FILM COATED ORAL DAILY
Refills: 0 | Status: DISCONTINUED | OUTPATIENT
Start: 2019-05-31 | End: 2019-06-04

## 2019-05-31 RX ORDER — SODIUM CHLORIDE 9 MG/ML
1000 INJECTION INTRAMUSCULAR; INTRAVENOUS; SUBCUTANEOUS
Refills: 0 | Status: DISCONTINUED | OUTPATIENT
Start: 2019-05-31 | End: 2019-06-04

## 2019-05-31 RX ORDER — ENOXAPARIN SODIUM 100 MG/ML
40 INJECTION SUBCUTANEOUS EVERY 24 HOURS
Refills: 0 | Status: DISCONTINUED | OUTPATIENT
Start: 2019-05-31 | End: 2019-06-04

## 2019-05-31 RX ORDER — ATORVASTATIN CALCIUM 80 MG/1
80 TABLET, FILM COATED ORAL AT BEDTIME
Refills: 0 | Status: DISCONTINUED | OUTPATIENT
Start: 2019-05-31 | End: 2019-06-04

## 2019-05-31 RX ADMIN — ATORVASTATIN CALCIUM 80 MILLIGRAM(S): 80 TABLET, FILM COATED ORAL at 21:50

## 2019-05-31 RX ADMIN — Medication 325 MILLIGRAM(S): at 11:38

## 2019-05-31 RX ADMIN — ENOXAPARIN SODIUM 40 MILLIGRAM(S): 100 INJECTION SUBCUTANEOUS at 06:18

## 2019-05-31 RX ADMIN — CLOPIDOGREL BISULFATE 75 MILLIGRAM(S): 75 TABLET, FILM COATED ORAL at 11:39

## 2019-05-31 NOTE — H&P ADULT - ASSESSMENT
73 y/o RH  male with past medical history of Hemachromatosis w/ bi-annual phlebotomies (next due in 2 weeks), HTN, HLD, Bladder Cancer, Bilateral Carotid stenosis on Full dose Aspirin presents to the ED with episode of difficulty expressing himself and comprehending how to put on his pants lasting 7-10 minutes with near complete resolution. LKN: 5/25. Neurologic exam with minimal perseveration (on sticking tongue out) otherwise non-focal. NIHSS 0. Pre-MRS 1. tPA not given as out of the window. Not an Endovascular candidate for thrombectomy due to low NIHSS and no evidence of LVO intracranial occlusion.   MRI brain from outside facility appears to demonstrates acute L MCA infarcts. MRA from outside reportedly demonstrated critical L ICA stenosis. CT Head and CTA H/N pending read.    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 Richard Libman)  [] Continue Aspirin 325mg PO QD  [] Carotid duplex  [] Lipitor 80 mg PO QHS  [] DVT prophylaxis with heparin/lovenox  [] HbA1c, LDL and continue with aggressive vascular risk factors modifications   [] NPO until patient passes dysphagia screen  [] Tele monitor  [] PT/OT/S&S eval    Plan discussed with Stroke Attending On Call. 75 y/o RH  male with past medical history of Hemachromatosis w/ bi-annual phlebotomies (next due in 2 weeks), HTN, HLD, Bladder Cancer, Bilateral Carotid stenosis on Full dose Aspirin presents to the ED with episode of difficulty expressing himself and comprehending how to put on his pants lasting 7-10 minutes with near complete resolution. LKN: 5/25. Neurologic exam with minimal perseveration (on sticking tongue out) otherwise non-focal. NIHSS 0. Pre-MRS 1. tPA not given as out of the window. Not an Endovascular candidate for thrombectomy due to low NIHSS and no evidence of LVO intracranial occlusion.   MRI brain from outside facility appears to demonstrates acute L MCA infarcts. MRA from outside reportedly demonstrated extracranial critical L ICA stenosis. CT Head and CTA H/N pending read.    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 Richard Libman)  [] Continue Aspirin 325mg PO QD  [] Carotid duplex  [] Neurosurgery Consult for CEA vs Stenting in the AM  [] Lipitor 80 mg PO QHS  [] DVT prophylaxis with heparin/lovenox  [] HbA1c, LDL and continue with aggressive vascular risk factors modifications   [] NPO until patient passes dysphagia screen  [] Tele monitor  [] PT/OT/S&S eval    Plan discussed with Stroke Attending On Call.

## 2019-05-31 NOTE — PHYSICAL THERAPY INITIAL EVALUATION ADULT - PRECAUTIONS/LIMITATIONS, REHAB EVAL
fall precautions/tPA not given as out of the window. Not an Endovascular candidate for thrombectomy due to low NIHSS and no evidence of LVO intracranial occlusion. MRI brain from outside facility appears to demonstrates acute L MCA infarcts. MRA from outside reportedly demonstrated extracranial critical L ICA stenosis. CT Head and CTA H/N pending read.

## 2019-05-31 NOTE — H&P ADULT - NSHPPHYSICALEXAM_GEN_ALL_CORE
Vital Signs Last 24 Hrs  T(C): 36.6 (30 May 2019 23:20), Max: 36.6 (30 May 2019 18:55)  T(F): 97.8 (30 May 2019 23:20), Max: 97.9 (30 May 2019 21:44)  HR: 69 (30 May 2019 23:20) (66 - 74)  BP: 122/64 (30 May 2019 23:20) (112/68 - 133/69)  BP(mean): --  RR: 20 (30 May 2019 23:20) (18 - 20)  SpO2: 96% (30 May 2019 23:20) (96% - 98%)    General Exam:   General appearance: No acute distress            Neurological Exam:  Mental Status: Orientated to self, date and place.  Attention intact.  No dysarthria. Speech fluent. Able to name and repeat. +Mild perseveration.  Cranial Nerves:   PERRL, EOMI, VFF, no nystagmus. CN V1-3 intact to light touch.  No facial asymmetry. Tongue, uvula and palate midline.  Sternocleidomastoid and Trapezius intact bilaterally.    Motor:   Tone: normal.                  Strength:     [] Upper extremity                      Delt       Bicep    Tricep                                                  R         5/5        5/5        5/5       5/5                                               L          5/5        5/5        5/5       5/5  [] Lower extremity                       HF          KE          KF        DF         PF                                               R        5/5        5/5        5/5       5/5       5/5                                               L         5/5        5/5       5/5       5/5        5/5  Pronator drift: none                 Dysmetria: None to finger-nose-finger or heel-shin-heel  Tremor: No resting, postural or action tremor. No myoclonus.     Sensation: intact to light touch. No extinction to simultaneous stimuli. No neglect.    Gait: Deferred

## 2019-05-31 NOTE — PHYSICAL THERAPY INITIAL EVALUATION ADULT - ADDITIONAL COMMENTS
5/31/19 CT BRAIN: Subacute left middle cerebral artery infarct involving the left anterior corona radiata, frontal operculum and anterior insula. No hemorrhagic conversion.  CTA NECK: -Critical stenosis/occlusion of the left internal carotid artery just distal to the bifurcation with distally diminished caliber but preserved patency. Correlation with carotid duplex sonography or conventional angiography would provide improved characterization of hemodynamics.-Moderate stenosis of the right internal carotid artery just distal to the bifurcation.   CTA HEAD: No significant stenosis or occlusion of the major proximal branches of the [place] of [place].

## 2019-05-31 NOTE — H&P ADULT - NSICDXPASTMEDICALHX_GEN_ALL_CORE_FT
PAST MEDICAL HISTORY:  Ex-heavy cigarette smoker (20-39 per day)     Hemochromatosis     Hyperlipemia     Hypertension     Malignant neoplasm of bladder wall     Osteoarthritis

## 2019-05-31 NOTE — CONSULT NOTE ADULT - PROBLEM SELECTOR RECOMMENDATION 2
Awaiting carotid stenting   Check TTE. If normal LVEF as expected, acceptable cardiac risk to proceed.

## 2019-05-31 NOTE — H&P ADULT - NSHPREVIEWOFSYSTEMS_GEN_ALL_CORE
Review of Systems:  CONSTITUTIONAL:   HEENT:  No visual loss, blurred vision, double vision.  No hearing loss, sneezing, congestion, runny nose or sore throat.  SKIN:  No rash or itching.  CARDIOVASCULAR:  No chest pain, chest pressure or chest discomfort. No palpitations or edema.  RESPIRATORY:  No shortness of breath, cough or sputum.  GASTROINTESTINAL:  No anorexia, nausea, vomiting or diarrhea. No abdominal pain.  GENITOURINARY:  NO dysuria. No increased frequency. No retention. No incontinence.  NEUROLOGICAL:  See HPI  MUSCULOSKELETAL:  No muscle, back pain, joint pain or stiffness.  HEMATOLOGIC:  No anemia, bleeding or bruising.  PSYCHIATRIC:    ENDOCRINOLOGIC:  No reports of sweating, cold or heat intolerance. No polyuria or polydipsia.

## 2019-05-31 NOTE — PHYSICAL THERAPY INITIAL EVALUATION ADULT - LIVES WITH, PROFILE
spouse/Prior to admission pt lives with wife in home with 3 stairs to entrance with unilateral HR, bedroom and bathroom on first floor, reports walk-in shower.  Prior to admission pt independent with all functional mobility including ambulation without AD.

## 2019-05-31 NOTE — H&P ADULT - NSICDXPASTSURGICALHX_GEN_ALL_CORE_FT
PAST SURGICAL HISTORY:  cardiac cath > 7 yrs. ago-neg-  at [facility]     H/O arthroscopy of knee- 2009     History of cystoscopy w/biopsy X 2 in 2004

## 2019-05-31 NOTE — ED ADULT NURSE NOTE - PSH
cardiac cath > 7 yrs. ago-neg-  at [facility]    H/O arthroscopy of knee- 2009    History of cystoscopy w/biopsy X 2 in 2004

## 2019-05-31 NOTE — ED ADULT NURSE NOTE - NSIMPLEMENTINTERV_GEN_ALL_ED
Implemented All Universal Safety Interventions:  [place] to call system. Call bell, personal items and telephone within reach. Instruct patient to call for assistance. Room bathroom lighting operational. Non-slip footwear when patient is off stretcher. Physically safe environment: no spills, clutter or unnecessary equipment. Stretcher in lowest position, wheels locked, appropriate side rails in place.

## 2019-05-31 NOTE — H&P ADULT - HISTORY OF PRESENT ILLNESS
LURDES [id number] is a 74y old  Male who presents with a chief complaint of     HPI: 73 y/o RH  male with past medical history of Hemachromatosis w/ bi-annual phlebotomies (next due in 2 weeks), HTN, HLD, Bladder Cancer, Bilateral Carotid stenosis on Full dose Aspirin presents to the ED sent in by private Neurologist (Dr. Deepak Sanford) for acute stroke on MRI.   Patient states that on 5/26, he was trying to put on pants when he noticed himself having trouble. Patient was unable to figure out how to put his pants on and then called his wife who noted he had difficulty expressing what he was trying to say. This lasted about 7-10 minutes before improving (unclear if resolved)          MEDICATIONS  (STANDING):    MEDICATIONS  (PRN):    PAST MEDICAL & SURGICAL HISTORY:  Hemochromatosis  Malignant neoplasm of bladder wall  Ex-heavy cigarette smoker (20-39 per day)  Osteoarthritis  Hyperlipemia  Hypertension  cardiac cath > 7 yrs. ago-neg-  at [facility]  H/O arthroscopy of knee- 2009  History of cystoscopy w/biopsy X 2 in 2004    FAMILY HISTORY:    Allergies    Indocin SR (Faint)    Intolerances        SHx - No smoking, No ETOH, No drug abuse      Review of Systems:  CONSTITUTIONAL:   HEENT:  No visual loss, blurred vision, double vision.  No hearing loss, sneezing, congestion, runny nose or sore throat.  SKIN:  No rash or itching.  CARDIOVASCULAR:  No chest pain, chest pressure or chest discomfort. No palpitations or edema.  RESPIRATORY:  No shortness of breath, cough or sputum.  GASTROINTESTINAL:  No anorexia, nausea, vomiting or diarrhea. No abdominal pain.  GENITOURINARY:  NO dysuria. No increased frequency. No retention. No incontinence.  NEUROLOGICAL:  See HPI  MUSCULOSKELETAL:  No muscle, back pain, joint pain or stiffness.  HEMATOLOGIC:  No anemia, bleeding or bruising.  PSYCHIATRIC:    ENDOCRINOLOGIC:  No reports of sweating, cold or heat intolerance. No polyuria or polydipsia. Stroke Admission     HPI: 75 y/o RH  male with past medical history of Hemachromatosis w/ bi-annual phlebotomies (next due in 2 weeks), HTN, HLD, Bladder Cancer, Bilateral Carotid stenosis on Full dose Aspirin presents to the ED sent in by private Neurologist (Dr. Deepak Sanford) for acute stroke on MRI.   Patient states that on 5/26, he was trying to put on pants when he noticed himself having trouble. Patient was unable to figure out how to put his pants on and then called his wife who noted he had difficulty expressing what he was trying to say. This lasted about 7-10 minutes before improving (unclear if resolved). Patient then reportedly went to [facility] where he had a series of CT scans done but left AMA before resulting due to the prolonged wait. Patient then followed up with his outpatient Neurologist for MRI brain which demonstrated acute L MCA distribution infarcts and L ICA critical stenosis.   At the time of my interview patient denies any complaints. Denies numbness, tingling, word finding difficulties, unilateral weakness.      MEDICATIONS  (STANDING):    MEDICATIONS  (PRN):    PAST MEDICAL & SURGICAL HISTORY:  Hemochromatosis  Malignant neoplasm of bladder wall  Ex-heavy cigarette smoker (20-39 per day)  Osteoarthritis  Hyperlipemia  Hypertension  cardiac cath > 7 yrs. ago-neg-  at [facility]  H/O arthroscopy of knee- 2009  History of cystoscopy w/biopsy X 2 in 2004    FAMILY HISTORY:    Allergies    Indocin SR (Faint)    Intolerances    SHx - No smoking, No ETOH, No drug abuse

## 2019-05-31 NOTE — PHYSICAL THERAPY INITIAL EVALUATION ADULT - PERTINENT HX OF CURRENT PROBLEM, REHAB EVAL
73 y/o RH  male with past medical history of Hemachromatosis w/ bi-annual phlebotomies (next due in 2 weeks), HTN, HLD, Bladder Cancer, Bilateral Carotid stenosis on Full dose Aspirin presents to the ED with episode of difficulty expressing himself and comprehending how to put on his pants lasting 7-10 minutes with near complete resolution. LKN: 5/25. Neurologic exam with minimal perseveration (on sticking tongue out) otherwise non-focal. NIHSS 0. Pre-MRS 1.

## 2019-05-31 NOTE — PHYSICAL THERAPY INITIAL EVALUATION ADULT - GENERAL OBSERVATIONS, REHAB EVAL
Pt ambulating independently from chair>bed, A&Ox4, +tele, +IVlock, pulseox, bpcuff, agreeable to session, sharon 30 min.

## 2019-05-31 NOTE — PHYSICAL THERAPY INITIAL EVALUATION ADULT - PREDICTED DURATION OF THERAPY (DAYS/WKS), PT EVAL
Pt is currently functioning at baseline (independent) and will be D/C from PT program at this time. Pt will remain on ambulation aide to ensure OOB/mobility/ambulation for remainder of hospital stay.

## 2019-05-31 NOTE — CONSULT NOTE ADULT - SUBJECTIVE AND OBJECTIVE BOX
CHIEF COMPLAINT:  CVA     HISTORY OF PRESENT ILLNESS:  73 y/o RH  male with past medical history of Hemachromatosis w/ bi-annual phlebotomies (next due in 2 weeks), HTN, HLD, Bladder Cancer, Bilateral Carotid stenosis on Full dose Aspirin presents to the ED sent in by private Neurologist (Dr. Deepak Sanford) for acute stroke on MRI.   Patient states that on 5/26, he was trying to put on pants when he noticed himself having trouble. Patient was unable to figure out how to put his pants on and then called his wife who noted he had difficulty expressing what he was trying to say. This lasted about 7-10 minutes before improving (unclear if resolved). Patient then reportedly went to [facility] where he had a series of CT scans done but left AMA before resulting due to the prolonged wait. Patient then followed up with his outpatient Neurologist for MRI brain which demonstrated acute L MCA distribution infarcts and L ICA critical stenosis and being scheduled for Stenting.   Denies chest pain, shortness of breath. Is active on a daily basis. No anginal symptoms.         PAST MEDICAL & SURGICAL HISTORY:  Hemochromatosis  Malignant neoplasm of bladder wall  Ex-heavy cigarette smoker (20-39 per day)  Osteoarthritis  Hyperlipemia  Hypertension  cardiac cath > 7 yrs. ago-neg-  at [facility]  H/O arthroscopy of knee- 2009  History of cystoscopy w/biopsy X 2 in 2004          MEDICATIONS:  clopidogrel Tablet 75 milliGRAM(s) Oral daily  enoxaparin Injectable 40 milliGRAM(s) SubCutaneous every 24 hours        aspirin 325 milliGRAM(s) Oral daily      atorvastatin 80 milliGRAM(s) Oral at bedtime    sodium chloride 0.9%. 1000 milliLiter(s) IV Continuous <Continuous>      FAMILY HISTORY:      SOCIAL HISTORY:    [ ] Non-smoker  [ ] Smoker  [ ] Alcohol    Allergies    Indocin SR (Faint)    Intolerances    	    REVIEW OF SYSTEMS:  CONSTITUTIONAL: No fever, weight loss, + fatigue  EYES: No eye pain, visual disturbances, or discharge  ENMT:  No difficulty hearing, tinnitus, vertigo; No sinus or throat pain  NECK: No pain or stiffness  RESPIRATORY: No cough, wheezing, chills or hemoptysis; No Shortness of Breath  CARDIOVASCULAR: No chest pain, palpitations, passing out, dizziness, or leg swelling  GASTROINTESTINAL: No abdominal or epigastric pain. No nausea, vomiting, or hematemesis; No diarrhea or constipation. No melena or hematochezia.  GENITOURINARY: No dysuria, frequency, hematuria, or incontinence  NEUROLOGICAL: No headaches, memory loss, loss of strength, numbness, or tremors  SKIN: No itching, burning, rashes, or lesions   LYMPH Nodes: No enlarged glands  ENDOCRINE: No heat or cold intolerance; No hair loss  MUSCULOSKELETAL: + joint pain or swelling; No muscle, back, or extremity pain  PSYCHIATRIC: No depression, anxiety, mood swings, or difficulty sleeping  HEME/LYMPH: No easy bruising, or bleeding gums  ALLERY AND IMMUNOLOGIC: No hives or eczema	    [ ] All others negative	  [ ] Unable to obtain    PHYSICAL EXAM:  T(C): 36.9 (05-31-19 @ 07:33), Max: 36.9 (05-31-19 @ 07:33)  HR: 76 (05-31-19 @ 10:00) (59 - 80)  BP: 108/69 (05-31-19 @ 10:00) (108/69 - 135/61)  RR: 15 (05-31-19 @ 10:00) (14 - 20)  SpO2: 100% (05-31-19 @ 10:00) (95% - 100%)  Wt(kg): --  I&O's Summary    31 May 2019 07:01  -  31 May 2019 11:43  --------------------------------------------------------  IN: 0 mL / OUT: 500 mL / NET: -500 mL        Appearance: Normal	  HEENT:   Normal oral mucosa, PERRL, EOMI	  Lymphatic: No lymphadenopathy  Cardiovascular: Normal S1 S2, No JVD, No murmurs, No edema  Respiratory: Lungs clear to auscultation	  Psychiatry: A & O x 3, Mood & affect appropriate  Gastrointestinal:  Soft, Non-tender, + BS	  Skin: No rashes, No ecchymoses, No cyanosis  Extremities: Normal range of motion, No clubbing, cyanosis or edema  Vascular: Peripheral pulses palpable 2+ bilaterally  Neurological Exam:  	Mental Status: Orientated to self, date and place.  Attention intact.  No dysarthria. Speech fluent. Able to name and repeat. +Mild perseveration.  	Cranial Nerves:   PERRL, EOMI, VFF, no nystagmus. CN V1-3 intact to light touch.  No facial asymmetry. Tongue, uvula and palate midline.  Sternocleidomastoid and Trapezius intact bilaterally.    	Motor:   	Tone: normal.                  	Strength:     	[] Upper extremity                      Delt       Bicep    Tricep     	                                             R         5/5        5/5        5/5       5/5  	                                             L          5/5        5/5        5/5       5/5  	[] Lower extremity                       HF          KE          KF        DF         PF  	                                             R        5/5        5/5        5/5       5/5       5/5  	                                             L         5/5        5/5       5/5       5/5        5/5  	Pronator drift: none                 	Dysmetria: None to finger-nose-finger or heel-shin-heel  	Tremor: No resting, postural or action tremor. No myoclonus.     	Sensation: intact to light touch. No extinction to simultaneous stimuli. No neglect.      TELEMETRY: SR	    ECG:  	NSR, RBBB, non specific stt changes   RADIOLOGY:  < from: CT Angio Neck w/ IV Cont (05.31.19 @ 00:15) >    EXAM:  CT ANGIO BRAIN (W)AW IC                          EXAM:  CT ANGIO NECK (W)AW IC                            PROCEDURE DATE:  05/31/2019            INTERPRETATION:  CT ANGIOGRAM OF THE HEAD AND NECK DATED 5/31/2019.    CLINICAL INFORMATION:  Slurred speech and history of left-sided MCA   territory stroke since Sunday.    COMPARISON: MRI MRA of the head and neck acquired 5/30/2019 at 5:05 PM    TECHNIQUE:  Initially, a noncontrast CT of the brain is performed with   axial images from the cranial vertex to the skull base.  Thereafter, a   bolus of IV contrast is administered to acquire a CT angiogram of the   vertebral and carotid arterial vasculature from the aortic arch to the   skull vertex.  70cc of Omnipaque 300 was administered without event. 30   cc was discarded.    FINDINGS:    CT BRAIN:  There is asymmetric lucency in the left frontal lobe involving the   anterior corona radiata, frontal operculum and anterior insula without   significant volume loss or mass effect, corresponding to known subacute   left middle cerebral artery infarct.    There is no acute intra-axial or extra-axial hemorrhage.  There is no   shift of the midline.  The basal cisterns are not effaced.  Ventricles   and sulci are appropriate for patient's stated age.       Regional soft tissues and bony calvarium are unremarkable.  Paranasal   sinuses and mastoid air cells are well aerated.    Patient is status post bilateral lens replacement surgery.    CT ANGIOGRAM:  The left common carotid artery ispatent and there is noncalcified and   calcified atherosclerotic plaque causing mild stenosis. There is   extensive calcified and noncalcified atherosclerotic plaque at the   carotid bifurcation, which is patent.     There is mild stenosis at the origin of the left external carotid artery   secondary to calcified plaque.     There is critical stenosis/occlusion of the left internal carotid artery   just distal to the bifurcation (13:485) secondary to calcified and   noncalcified atherosclerotic plaque. The remainder of the left internal   carotid artery is diminished in caliber compared to the right, but   patent. There are scattered calcifications of the cavernous, clinoid and   supraclinoid segment.    The right common carotid artery is widely patent to the carotid   bifurcation. There is bulky calcified and noncalcified plaque at the   carotid bifurcation extending to the origins of the internal and external   carotid arteries.     There is mild stenosis at the origin of the right external carotid   artery.     There is moderate right internal carotid stenosis just distal to the   bifurcation. The remainder of the internal carotid artery is patent   without significant stenosis and there are scattered calcifications of   the cavernous, clinoid and supraclinoid segments.     There is asymmetric diminished enhancement of the anterior temporal   branch of the left middle cerebral artery. The proxiamal middle and   anterior cerebral arteries are otherwise patent without significant  stenosis. The distal branches are grossly symmetric.  The anterior   communicating artery is visualized.    The vertebral arteries are symmetric size and patent without significant   stenosis.  The basilar artery is widely patent.  The proximal PCAs are   patent without significant stenosis.  The origins of the superior   cerebellar arteries, small bilateral AICAs and bilateral PICAs are   identified.  Bilateral posterior communicating arteries are visualized.    There is no evidence of an intracranial arterial aneurysm on CTA.    The soft tissue of the neck are grossly unremarkable.  The lung apices   are clear.  The regional osseous structures are unremarkable.    IMPRESSION:    CT BRAIN: Subacute left middle cerebral artery infarct involving the left   anterior corona radiata, frontal operculum and anterior insula. No   hemorrhagic conversion.    CTA NECK:   -Critical stenosis/occlusion of the left internal carotid artery just   distal to the bifurcation with distally diminished caliber but preserved   patency. Correlation with carotid duplex sonography or conventional   angiography would provide improved characterization of hemodynamics.    -Moderate stenosis of the right internal carotid artery just distal to   the bifurcation.    CTA HEAD:  No significant stenosis or occlusion of the major proximal   branches of the [place].                VIOLETA BAUTISTA M.D., RADIOLOGY RESIDENT  This document has been electronically signed.  SIRISHA HINES M.D., ATTENDING RADIOLOGIST  This document has been electronically signed. May 31 2019  2:16AM                < end of copied text >    OTHER: 	  	  LABS:	 	    CARDIAC MARKERS:                                  14.5   7.4   )-----------( 208      ( 31 May 2019 06:48 )             43.3     05-31    137  |  99  |  22  ----------------------------<  97  4.1   |  25  |  1.16    Ca    9.6      31 May 2019 06:48    TPro  6.7  /  Alb  4.0  /  TBili  0.5  /  DBili  x   /  AST  22  /  ALT  26  /  AlkPhos  85  05-31    proBNP:   Lipid Profile:   HgA1c: Hemoglobin A1C, Whole Blood: 5.8 % (05-31 @ 10:15)  Hemoglobin A1C, Whole Blood: 5.8 % (05-31 @ 10:15)    TSH:

## 2019-05-31 NOTE — ED ADULT NURSE NOTE - OBJECTIVE STATEMENT
74 y.o M A&Ox3 with PMH of hemochromatosis, bladder CA (in remission), osteoarthritis, HLD, HTN, presents to the ED sent in by Dr. Sanford for admission for acute infarct and critical L ICA stenosis seen on MRI. Pt. reports on Monday into Tuesday he could not figure out how to get his pants off,  then wanted to tell wife to bring him to the hospital but was unable to speak. Pt. reports symptoms lasted for 7-10 minutes. Pt. was taken to [facility], where he had lab work done and imaging but then signed out AMA. Pt. had outpatient MRI done today and was told by Dr. Sanford to come to ED today for stroke service. Pt. speaking in full coherent sentences at this time. Pos. strength noted to all extremities. Sensory perception equal and intact and pt. denies numbness/tingling sensation to all extremities. Denies fever, chills, dizziness, HA, n/v/d, ABD pain, weakness, CP, SOB and vision changes at this time. Neuro checks being completed and placed in pt. paper chart. Safety and comfort provided.

## 2019-05-31 NOTE — H&P ADULT - NSHPLABSRESULTS_GEN_ALL_CORE
05-30    137  |  99  |  27<H>  ----------------------------<  108<H>  4.0   |  26  |  1.53<H>    Ca    9.9      30 May 2019 23:30    TPro  7.2  /  Alb  4.3  /  TBili  0.5  /  DBili  x   /  AST  22  /  ALT  26  /  AlkPhos  88  05-30                            14.8   9.0   )-----------( 252      ( 30 May 2019 23:30 )             44.0

## 2019-05-31 NOTE — H&P ADULT - ATTENDING COMMENTS
74-year-old right-handed white gentleman first evaluated at [facility] on 5/31/19 with aphasia. He has been followed conservatively for several years with asymptomatic carotid stenosis. He had been on aspirin, but aspirin was stopped 3 weeks prior to admission due to hematuria. On 5/25/19, he developed virtual inability to generate speech, which was severely "garbled" for about 10 minutes. At the same time, he had difficulty putting on his pants but without obvious motor deficits. His severe speech difficulties lasted about 10 minutes, but he was left with residual mild word finding difficulty. He was seen by Dr. Deepak Leone (neurology) who obtained an MRI and sent him to the ED. He has a history of hemochromatosis. He quit smoking about 15 years prior to admission. He is a retired [place] . ROS otherwise negative. Exam. Alert and attentive; speech fluent, prosodic, with no word finding difficulty or paraphasic errors; followed crossed commands; repetition intact; gait not tested; remainder of neurologic exam is nonfocal. MRI brain (5/30/19) to my eye showed a small-moderate-sized acute right MCA infarct involving the insula and high frontal convexity, and a punctate component in the left parietal cortex. CTA neck and head (5/30/19) to my eye showed a severe extracranial left ICA stenosis, and a moderate right ICA stenosis. Impression. He had known bilateral asymptomatic carotid stenosis which had been followed conservatively. He had stopped taking aspirin about 3 weeks prior to admission due to hematuria. On 5/25/19, he developed probable motor aphasia and possibly apraxia, which subsequently improved but probably not quite back to baseline. His symptoms were due to acute left MCA infarction, most likely due to symptomatic severe left carotid stenosis, which should be confirmed. Plan. Carotid Doppler; TTE; aspirin; he should benefit from expeditious revascularization of his left carotid artery, probably endarterectomy with stenting as an option; possibly a candidate for the Stroke AF study; PT/OT.

## 2019-06-01 LAB
ANION GAP SERPL CALC-SCNC: 10 MMOL/L — SIGNIFICANT CHANGE UP (ref 5–17)
BUN SERPL-MCNC: 19 MG/DL — SIGNIFICANT CHANGE UP (ref 7–23)
CALCIUM SERPL-MCNC: 9.6 MG/DL — SIGNIFICANT CHANGE UP (ref 8.4–10.5)
CHLORIDE SERPL-SCNC: 102 MMOL/L — SIGNIFICANT CHANGE UP (ref 96–108)
CHOLEST SERPL-MCNC: 138 MG/DL — SIGNIFICANT CHANGE UP (ref 10–199)
CO2 SERPL-SCNC: 26 MMOL/L — SIGNIFICANT CHANGE UP (ref 22–31)
CREAT SERPL-MCNC: 1.02 MG/DL — SIGNIFICANT CHANGE UP (ref 0.5–1.3)
GLUCOSE SERPL-MCNC: 107 MG/DL — HIGH (ref 70–99)
HCT VFR BLD CALC: 43.9 % — SIGNIFICANT CHANGE UP (ref 39–50)
HDLC SERPL-MCNC: 43 MG/DL — SIGNIFICANT CHANGE UP
HGB BLD-MCNC: 14.5 G/DL — SIGNIFICANT CHANGE UP (ref 13–17)
LIPID PNL WITH DIRECT LDL SERPL: 80 MG/DL — SIGNIFICANT CHANGE UP
MCHC RBC-ENTMCNC: 30.6 PG — SIGNIFICANT CHANGE UP (ref 27–34)
MCHC RBC-ENTMCNC: 33 GM/DL — SIGNIFICANT CHANGE UP (ref 32–36)
MCV RBC AUTO: 92.7 FL — SIGNIFICANT CHANGE UP (ref 80–100)
PLATELET # BLD AUTO: 236 K/UL — SIGNIFICANT CHANGE UP (ref 150–400)
POTASSIUM SERPL-MCNC: 4 MMOL/L — SIGNIFICANT CHANGE UP (ref 3.5–5.3)
POTASSIUM SERPL-SCNC: 4 MMOL/L — SIGNIFICANT CHANGE UP (ref 3.5–5.3)
RBC # BLD: 4.73 M/UL — SIGNIFICANT CHANGE UP (ref 4.2–5.8)
RBC # FLD: 12.1 % — SIGNIFICANT CHANGE UP (ref 10.3–14.5)
SODIUM SERPL-SCNC: 138 MMOL/L — SIGNIFICANT CHANGE UP (ref 135–145)
TOTAL CHOLESTEROL/HDL RATIO MEASUREMENT: 3.2 RATIO — LOW (ref 3.4–9.6)
TRIGL SERPL-MCNC: 74 MG/DL — SIGNIFICANT CHANGE UP (ref 10–149)
WBC # BLD: 7 K/UL — SIGNIFICANT CHANGE UP (ref 3.8–10.5)
WBC # FLD AUTO: 7 K/UL — SIGNIFICANT CHANGE UP (ref 3.8–10.5)

## 2019-06-01 PROCEDURE — 93010 ELECTROCARDIOGRAM REPORT: CPT

## 2019-06-01 RX ADMIN — ENOXAPARIN SODIUM 40 MILLIGRAM(S): 100 INJECTION SUBCUTANEOUS at 05:59

## 2019-06-01 RX ADMIN — ATORVASTATIN CALCIUM 80 MILLIGRAM(S): 80 TABLET, FILM COATED ORAL at 21:45

## 2019-06-01 RX ADMIN — CLOPIDOGREL BISULFATE 75 MILLIGRAM(S): 75 TABLET, FILM COATED ORAL at 12:17

## 2019-06-01 RX ADMIN — Medication 325 MILLIGRAM(S): at 12:17

## 2019-06-01 NOTE — SPEECH LANGUAGE PATHOLOGY EVALUATION - COMMENTS
Per Neuro on admit: "Neurological Exam: Mental Status: Orientated to self, date and place.  Attention intact.  No dysarthria. Speech fluent. Able to name and repeat. +Mild perseveration." "Neurological exam with minimal perseveration (on sticking tongue out) otherwise non-focal. NIHSS 0. Pre-MRS 1.Passed RN swallow screen on 5/30 @ 2320 in the ED. tPA not given as out of the window. Not an Endovascular candidate for thrombectomy due to low NIHSS and no evidence of LVO intracranial occlusion. 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 No hemorrhagic conversion. CTA NECK: Critical stenosis/occlusion of L ICA just distal to bifurcation with distally diminished caliber but preserved patency. Correlation with carotid duplex sonography or conventional angiography would provide improved characterization of hemodynamics. Moderate stenosis of R ICA just distal to bifurcation. CTA HEAD: No significant stenosis or occlusion of major proximal branches of [place].    Carotid duplex: Impression: Bilateral calcified plaque, left greater than right. Findings consistent with greater than 70% stenosis of the proximal left internal carotid artery. There is 50-69% stenosis of the proximal right internal carotid artery. There are stenoses of both external carotid arteries.    Plan for carotid stent    Pt seen by PT, no skilled needs For [employer] picture description, pt demonstrated verbal production that was fluent, grammatical, generally appropriate but with use of non-specific language, circumlocutions and semantic paraphasias.    Pt presents with verbal expression that is fluent, grammatical, largely appropriate. Pt presents with intermittent use of short, rapid phrase-like utterances interspersed with mostly complete sentences, which Pt's family reports is typical of his baseline. Pt with intermittent use of non-specific language and occasional word-finding errors including semantic and phonemic paraphasias, and circumlocutions. Pt's family reports that Pt's attention and behavior appear to be at baseline. Pt's wife reports that Pt has "undiagnosed ADHD." She states that he always rushes to answer questions without fully listening, and often speaks in short rapid phrase-like utterances. Pt generated a single accurate simple sentence using a given word.  Pt accurately wrote his name and address.  Pt accurately roxie a clock with the time 12:45, although the number "12" was missing. For comprehension/recall of verbally presented paragraph, Pt answered 4/7 questions, benefitted from repetition of the question for 1 item, and field of 3 choices for the other 2; possible interference of attention deficits, memory difficulty.    Comprehension appears grossly intact with occasional breakdown as complexity increases, with possible interference of attention deficits.

## 2019-06-01 NOTE — PROGRESS NOTE ADULT - SUBJECTIVE AND OBJECTIVE BOX
THE PATIENT WAS SEEN AND EXAMINED BY ME WITH THE HOUSESTAFF AND STROKE TEAM DURING MORNING ROUNDS.   HPI:  74-year-old right-handed white gentleman first evaluated at [facility] on 5/31/19 with aphasia. He has been followed conservatively for several years with asymptomatic carotid stenosis. He had been on aspirin, but aspirin was stopped 3 weeks prior to admission due to hematuria. On 5/25/19, he developed virtual inability to generate speech, which was severely "garbled" for about 10 minutes. At the same time, he had difficulty putting on his pants but without obvious motor deficits. His severe speech difficulties lasted about 10 minutes, but he was left with residual mild word finding difficulty. He was seen by Dr. Deepak Leone (neurology) who obtained an MRI and sent him to the ED. He has a history of hemochromatosis. He quit smoking about 15 years prior to admission. He is a retired [place] .     ROS otherwise negative.    SUBJECTIVE: No events overnight.  No new neurologic complaints.      aspirin 325 milliGRAM(s) Oral daily  atorvastatin 80 milliGRAM(s) Oral at bedtime  clopidogrel Tablet 75 milliGRAM(s) Oral daily  enoxaparin Injectable 40 milliGRAM(s) SubCutaneous every 24 hours  sodium chloride 0.9%. 1000 milliLiter(s) IV Continuous <Continuous>      PHYSICAL EXAM:   Vital Signs Last 24 Hrs  T(C): 37.2 (31 May 2019 19:14), Max: 37.2 (31 May 2019 19:14)  T(F): 99 (31 May 2019 19:14), Max: 99 (31 May 2019 19:14)  HR: 69 (01 Jun 2019 06:00) (66 - 77)  BP: 133/71 (01 Jun 2019 06:00) (108/56 - 141/68)  BP(mean): 91 (01 Jun 2019 06:00) (70 - 95)  RR: 15 (01 Jun 2019 06:00) (14 - 21)  SpO2: 100% (01 Jun 2019 06:00) (93% - 100%)    General: No acute distress  HEENT: EOM intact, visual fields full  Abdomen: Soft, nontender, nondistended   Extremities: No edema    NEUROLOGICAL EXAM:  Mental status: Alert and attentive; speech fluent, prosodic, with no word finding difficulty or paraphasic errors; followed crossed commands; repetition intact   Cranial Nerves: No facial asymmetry, no nystagmus, no dysarthria, tongue midline  Motor exam: extremities move well against gravity   Sensation: Intact to light touch   Coordination/ Gait: No dysmetria, gait not tested    LABS:                        14.5   7.0   )-----------( 236      ( 01 Jun 2019 05:39 )             43.9    06-01    138  |  102  |  19  ----------------------------<  107<H>  4.0   |  26  |  1.02    Ca    9.6      01 Jun 2019 05:39    TPro  6.7  /  Alb  4.0  /  TBili  0.5  /  DBili  x   /  AST  22  /  ALT  26  /  AlkPhos  85  05-31    Hemoglobin A1C, Whole Blood: 5.8 % (05-31 @ 10:15)  Hemoglobin A1C, Whole Blood: 5.8 % (05-31 @ 10:15)    IMAGING: Reviewed by me.     (05.31.19)  CT BRAIN: Subacute left middle cerebral artery infarct involving the left   anterior corona radiata, frontal operculum and anterior insula. No   hemorrhagic conversion.    CTA NECK:   Critical stenosis/occlusion of the left internal carotid artery just   distal to the bifurcation with distally diminished caliber but preserved   patency. Correlation with carotid duplex sonography or conventional   angiography would provide improved characterization of hemodynamics.  Moderate stenosis of the right internal carotid artery just distal to   the bifurcation.     CTA HEAD: No significant stenosis or occlusion of the major proximal   branches of the [place]. THE PATIENT WAS SEEN AND EXAMINED BY ME WITH THE HOUSESTAFF AND STROKE TEAM DURING MORNING ROUNDS.   HPI:  74-year-old right-handed white gentleman first evaluated at [facility] on 5/31/19 with aphasia. He has been followed conservatively for several years with asymptomatic carotid stenosis. He had been on aspirin, but aspirin was stopped 3 weeks prior to admission due to hematuria. On 5/25/19, he developed virtual inability to generate speech, which was severely "garbled" for about 10 minutes. At the same time, he had difficulty putting on his pants but without obvious motor deficits. His severe speech difficulties lasted about 10 minutes, but he was left with residual mild word finding difficulty. He was seen by Dr. Deepak Leone (neurology) who obtained an MRI and sent him to the ED. He has a history of hemochromatosis. He quit smoking about 15 years prior to admission. He is a retired [place] .     ROS otherwise negative.    SUBJECTIVE: No events overnight.  No new neurologic complaints.      aspirin 325 milliGRAM(s) Oral daily  atorvastatin 80 milliGRAM(s) Oral at bedtime  clopidogrel Tablet 75 milliGRAM(s) Oral daily  enoxaparin Injectable 40 milliGRAM(s) SubCutaneous every 24 hours  sodium chloride 0.9%. 1000 milliLiter(s) IV Continuous <Continuous>    PHYSICAL EXAM:   Vital Signs Last 24 Hrs  T(C): 37.2 (31 May 2019 19:14), Max: 37.2 (31 May 2019 19:14)  T(F): 99 (31 May 2019 19:14), Max: 99 (31 May 2019 19:14)  HR: 69 (01 Jun 2019 06:00) (66 - 77)  BP: 133/71 (01 Jun 2019 06:00) (108/56 - 141/68)  BP(mean): 91 (01 Jun 2019 06:00) (70 - 95)  RR: 15 (01 Jun 2019 06:00) (14 - 21)  SpO2: 100% (01 Jun 2019 06:00) (93% - 100%)    General: No acute distress  HEENT: EOM intact, visual fields full  Abdomen: Soft, nontender, nondistended   Extremities: No edema    NEUROLOGICAL EXAM:  Mental status: Alert and attentive; speech fluent, prosodic, with no word finding difficulty or paraphasic errors; followed crossed commands; repetition intact   Cranial Nerves: No facial asymmetry, no nystagmus, no dysarthria, tongue midline  Motor exam: extremities move well against gravity   Sensation: Intact to light touch   Coordination/ Gait: No dysmetria, gait not tested    LABS:                        14.5   7.0   )-----------( 236      ( 01 Jun 2019 05:39 )             43.9    06-01    138  |  102  |  19  ----------------------------<  107<H>  4.0   |  26  |  1.02    Ca    9.6      01 Jun 2019 05:39    TPro  6.7  /  Alb  4.0  /  TBili  0.5  /  DBili  x   /  AST  22  /  ALT  26  /  AlkPhos  85  05-31    Hemoglobin A1C, Whole Blood: 5.8 % (05-31 @ 10:15)  Hemoglobin A1C, Whole Blood: 5.8 % (05-31 @ 10:15)    IMAGING: Reviewed by me.     (05.31.19)  CT BRAIN: Subacute left middle cerebral artery infarct involving the left   anterior corona radiata, frontal operculum and anterior insula. No   hemorrhagic conversion.    CTA NECK:   Critical stenosis/occlusion of the left internal carotid artery just   distal to the bifurcation with distally diminished caliber but preserved   patency. Correlation with carotid duplex sonography or conventional   angiography would provide improved characterization of hemodynamics.  Moderate stenosis of the right internal carotid artery just distal to   the bifurcation.     CTA HEAD: No significant stenosis or occlusion of the major proximal   branches of the [place].

## 2019-06-01 NOTE — OCCUPATIONAL THERAPY INITIAL EVALUATION ADULT - LIVES WITH, PROFILE
spouse/Pt lives with his wife in a private home, 3 steps to enter, no steps inside. Pt was independent with ADLs, IADLs, functional mobility, driving prior to admission. Pt has a walk in shower with a grab bar. Pt reports an active lifestyle, golfing 3 days per week.

## 2019-06-01 NOTE — PROGRESS NOTE ADULT - SUBJECTIVE AND OBJECTIVE BOX
Subjective: Patient seen and examined. No new events except as noted.   feels ok     REVIEW OF SYSTEMS:    CONSTITUTIONAL: No weakness, fevers or chills  EYES/ENT: No visual changes;  No vertigo or throat pain   NECK: No pain or stiffness  RESPIRATORY: No cough, wheezing, hemoptysis; No shortness of breath  CARDIOVASCULAR: No chest pain or palpitations  GASTROINTESTINAL: No abdominal or epigastric pain. No nausea, vomiting, or hematemesis; No diarrhea or constipation. No melena or hematochezia.  GENITOURINARY: No dysuria, frequency or hematuria  NEUROLOGICAL: No numbness or weakness  SKIN: No itching, burning, rashes, or lesions   All other review of systems is negative unless indicated above.    MEDICATIONS:  MEDICATIONS  (STANDING):  aspirin 325 milliGRAM(s) Oral daily  atorvastatin 80 milliGRAM(s) Oral at bedtime  clopidogrel Tablet 75 milliGRAM(s) Oral daily  enoxaparin Injectable 40 milliGRAM(s) SubCutaneous every 24 hours  sodium chloride 0.9%. 1000 milliLiter(s) (75 mL/Hr) IV Continuous <Continuous>      PHYSICAL EXAM:  T(C): 36.8 (06-01-19 @ 20:23), Max: 36.9 (06-01-19 @ 14:00)  HR: 69 (06-01-19 @ 20:23) (66 - 99)  BP: 153/76 (06-01-19 @ 20:23) (110/59 - 165/63)  RR: 20 (06-01-19 @ 20:23) (12 - 22)  SpO2: 95% (06-01-19 @ 20:23) (95% - 100%)  Wt(kg): --  I&O's Summary    31 May 2019 07:01  -  01 Jun 2019 07:00  --------------------------------------------------------  IN: 300 mL / OUT: 950 mL / NET: -650 mL    01 Jun 2019 07:01  -  01 Jun 2019 22:54  --------------------------------------------------------  IN: 360 mL / OUT: 0 mL / NET: 360 mL          Appearance: Normal	  HEENT:   Normal oral mucosa, PERRL, EOMI	  Lymphatic: No lymphadenopathy , no edema  Cardiovascular: Normal S1 S2, No JVD, No murmurs , Peripheral pulses palpable 2+ bilaterally  Respiratory: Lungs clear to auscultation, normal effort 	  Gastrointestinal:  Soft, Non-tender, + BS	  Skin: No rashes, No ecchymoses, No cyanosis, warm to touch  Musculoskeletal: Normal range of motion, normal strength  Psychiatry:  Mood & affect appropriate  Ext: No edema      LABS:    CARDIAC MARKERS:                                14.5   7.0   )-----------( 236      ( 01 Jun 2019 05:39 )             43.9     06-01    138  |  102  |  19  ----------------------------<  107<H>  4.0   |  26  |  1.02    Ca    9.6      01 Jun 2019 05:39    TPro  6.7  /  Alb  4.0  /  TBili  0.5  /  DBili  x   /  AST  22  /  ALT  26  /  AlkPhos  85  05-31    proBNP:   Lipid Profile:   HgA1c:   TSH:             TELEMETRY: 	  SR  ECG:  	  RADIOLOGY:  < from: [facility] (05.31.19 @ 12:50) >    EXAM:  CAROTID DUPLEX BILATERAL                            PROCEDURE DATE:  05/31/2019            INTERPRETATION:  Clinical information: 74-year-old with history of   hypertension, hyperlipidemia, smoking and bilateral carotid stenosis   status post left MCA stroke with difficulty speaking. Assess carotid   stenosis    Technique: Grayscale, color and spectral Doppler examination of both   carotid arteries was performed. Comparison is made with prior study from   12/8/2015.    There is bilateralintimal thickening and calcified plaque in both   carotid bifurcations extending into the internal and external carotid   arteries, left greater than right. Blood flow velocities are as follows:    RIGHT:    PROX CCA = 77 ;  DIST CCA = 73 ;  PROX ICA= 144 ;  DIST ICA =   1:15 ;  ECA = 236    LEFT   :    PROX CCA = 61 ;  DIST CCA = 79 ;  PROX ICA = 349 ;  DIST ICA   = 34 ;  ECA = 207    Both vertebral arteries demonstrate antegrade flow.    Impression: Bilateral calcified plaque, left greater than right.    Findings consistent with greater than 70% stenosis of the proximal left   internal carotid artery.    There is 50-69% stenosis of the proximal right internal carotid artery.    There are stenoses of both external carotid arteries.    Thesefindings are not significantly changed compared to the prior study.    Measurement of carotid stenosis is based on velocity parameters that   correlate the residual internal carotid diameter with that of the more   distal vessel in accordance with a method such as the [place] American   Symptomatic Carotid Endarterectomy Trial (NASCET).                            MCKENZIE ALVAREZ M.D., ATTENDING RADIOLOGIST  This document has been electronically signed. May 31 2019  2:19PM                < end of copied text >    DIAGNOSTIC TESTING:  [ ] Echocardiogram:  [ ]  Catheterization:  [ ] Stress Test:    OTHER: Subjective: Patient seen and examined. No new events except as noted.   MOved out of Stroke unit   feels ok     REVIEW OF SYSTEMS:    CONSTITUTIONAL: No weakness, fevers or chills  EYES/ENT: No visual changes;  No vertigo or throat pain   NECK: No pain or stiffness  RESPIRATORY: No cough, wheezing, hemoptysis; No shortness of breath  CARDIOVASCULAR: No chest pain or palpitations  GASTROINTESTINAL: No abdominal or epigastric pain. No nausea, vomiting, or hematemesis; No diarrhea or constipation. No melena or hematochezia.  GENITOURINARY: No dysuria, frequency or hematuria  NEUROLOGICAL: No numbness or weakness  SKIN: No itching, burning, rashes, or lesions   All other review of systems is negative unless indicated above.    MEDICATIONS:  MEDICATIONS  (STANDING):  aspirin 325 milliGRAM(s) Oral daily  atorvastatin 80 milliGRAM(s) Oral at bedtime  clopidogrel Tablet 75 milliGRAM(s) Oral daily  enoxaparin Injectable 40 milliGRAM(s) SubCutaneous every 24 hours  sodium chloride 0.9%. 1000 milliLiter(s) (75 mL/Hr) IV Continuous <Continuous>      PHYSICAL EXAM:  T(C): 36.8 (06-01-19 @ 20:23), Max: 36.9 (06-01-19 @ 14:00)  HR: 69 (06-01-19 @ 20:23) (66 - 99)  BP: 153/76 (06-01-19 @ 20:23) (110/59 - 165/63)  RR: 20 (06-01-19 @ 20:23) (12 - 22)  SpO2: 95% (06-01-19 @ 20:23) (95% - 100%)  Wt(kg): --  I&O's Summary    31 May 2019 07:01  -  01 Jun 2019 07:00  --------------------------------------------------------  IN: 300 mL / OUT: 950 mL / NET: -650 mL    01 Jun 2019 07:01  -  01 Jun 2019 22:54  --------------------------------------------------------  IN: 360 mL / OUT: 0 mL / NET: 360 mL          Appearance: Normal	  HEENT:   Normal oral mucosa, PERRL, EOMI	  Lymphatic: No lymphadenopathy , no edema  Cardiovascular: Normal S1 S2, No JVD, No murmurs , Peripheral pulses palpable 2+ bilaterally  Respiratory: Lungs clear to auscultation, normal effort 	  Gastrointestinal:  Soft, Non-tender, + BS	  Skin: No rashes, No ecchymoses, No cyanosis, warm to touch  Musculoskeletal: Normal range of motion, normal strength  Psychiatry:  Mood & affect appropriate  Ext: No edema      LABS:    CARDIAC MARKERS:                                14.5   7.0   )-----------( 236      ( 01 Jun 2019 05:39 )             43.9     06-01    138  |  102  |  19  ----------------------------<  107<H>  4.0   |  26  |  1.02    Ca    9.6      01 Jun 2019 05:39    TPro  6.7  /  Alb  4.0  /  TBili  0.5  /  DBili  x   /  AST  22  /  ALT  26  /  AlkPhos  85  05-31    proBNP:   Lipid Profile:   HgA1c:   TSH:             TELEMETRY: 	  SR  ECG:  	  RADIOLOGY:  < from: [facility] (05.31.19 @ 12:50) >    EXAM:  CAROTID DUPLEX BILATERAL                            PROCEDURE DATE:  05/31/2019            INTERPRETATION:  Clinical information: 74-year-old with history of   hypertension, hyperlipidemia, smoking and bilateral carotid stenosis   status post left MCA stroke with difficulty speaking. Assess carotid   stenosis    Technique: Grayscale, color and spectral Doppler examination of both   carotid arteries was performed. Comparison is made with prior study from   12/8/2015.    There is bilateralintimal thickening and calcified plaque in both   carotid bifurcations extending into the internal and external carotid   arteries, left greater than right. Blood flow velocities are as follows:    RIGHT:    PROX CCA = 77 ;  DIST CCA = 73 ;  PROX ICA= 144 ;  DIST ICA =   1:15 ;  ECA = 236    LEFT   :    PROX CCA = 61 ;  DIST CCA = 79 ;  PROX ICA = 349 ;  DIST ICA   = 34 ;  ECA = 207    Both vertebral arteries demonstrate antegrade flow.    Impression: Bilateral calcified plaque, left greater than right.    Findings consistent with greater than 70% stenosis of the proximal left   internal carotid artery.    There is 50-69% stenosis of the proximal right internal carotid artery.    There are stenoses of both external carotid arteries.    Thesefindings are not significantly changed compared to the prior study.    Measurement of carotid stenosis is based on velocity parameters that   correlate the residual internal carotid diameter with that of the more   distal vessel in accordance with a method such as the North American   Symptomatic Carotid Endarterectomy Trial (NASCET).                            MCKENZIE ALVAREZ M.D., ATTENDING RADIOLOGIST  This document has been electronically signed. May 31 2019  2:19PM                < end of copied text >    DIAGNOSTIC TESTING:  [ ] Echocardiogram:  [ ]  Catheterization:  [ ] Stress Test:    OTHER:

## 2019-06-01 NOTE — SPEECH LANGUAGE PATHOLOGY EVALUATION - SLP DIAGNOSIS
Attempted to see pt for swallow evaluation. Chart reviewed. Pt asleep upon arrival, pt and wife requested that he be allowed to rest and for evaluation to be deferred today.
Pt presents with a very mild expressive and receptive aphasia notable for fluent, grammatical, largely appropriate verbal output, with occasional non-specific language, semantic and phonemic paraphasias, and circumlocutions, and comprehension notable for occasional breakdown with more complex level tasks, suspect possible interference of reduced attention. Pt with cognitive-linguistic abilities notable for reduced attention/concentration, which family reports is his baseline. Wife states that Pt has, "undiagnosed ADHD."

## 2019-06-01 NOTE — SPEECH LANGUAGE PATHOLOGY EVALUATION - SLP PERTINENT HISTORY OF CURRENT PROBLEM
REFER TO ADDENDUM FOR HISTORY/HOSPITAL COURSE.
75 y/o RH  male with h/o Hemachromatosis w/ bi-annual phlebotomies (next due in 2 weeks) and  Bilateral Carotid stenosis on Full dose Aspirin presents to the ED sent in by private Neurologist (Dr. Deepak Sanford) for acute stroke on MRI. Pt states that on 5/26, he was trying to put on pants when he noticed himself having trouble. Patient was unable to figure out how to put his pants on and then called his wife who noted he had difficulty expressing what he was trying to say. This lasted about 7-10 minutes before improving (unclear if resolved). Patient then reportedly went to [facility] where he had a series of CT scans done but left AMA before resulting due to the prolonged wait. Patient then followed up with his outpatient Neurologist for MRI brain which demonstrated acute L MCA distribution infarcts and L ICA critical stenosis.

## 2019-06-01 NOTE — OCCUPATIONAL THERAPY INITIAL EVALUATION ADULT - PERTINENT HX OF CURRENT PROBLEM, REHAB EVAL
75 y/o M  PMH of Hemachromatosis w/ bi-annual phlebotomies, HTN, HLD, Bladder Cancer, B/l Carotid stenosis on Full dose Aspirin presents to the ED sent by private Neurologist for acute stroke on MRI. Pt states that on 5/26, he was trying to put on pants when he noticed himself having trouble. Pt was unable to figure out how to put his pants on & called his wife who noted he had difficulty expressing what he was trying to say. This lasted 7-10 mins before improving. See below.

## 2019-06-01 NOTE — SPEECH LANGUAGE PATHOLOGY EVALUATION - SLP GENERAL OBSERVATIONS
Pt seen at bedside with family present. Pt awake and alert, OOB in chair, verbally responsive, generally cooperative with exam.

## 2019-06-01 NOTE — SPEECH LANGUAGE PATHOLOGY EVALUATION - YES/NO QUESTIONS: COMPLEX
8/11; improved when reattempted after delay with repetition and cues to listen closely; suspect possible interference of reduced attention

## 2019-06-01 NOTE — SPEECH LANGUAGE PATHOLOGY EVALUATION - RESPONSIVE NAMING
3/3 simple; 3/3 simple sentence completions;  for lower frequency vocabulary items: 7/10 responsive/descriptive naming; 6/8 sentence completions

## 2019-06-01 NOTE — OCCUPATIONAL THERAPY INITIAL EVALUATION ADULT - ADDITIONAL COMMENTS
CTA Head/Neck 5/31/19: Subacute left middle cerebral artery infarct involving the left anterior corona radiata, frontal operculum and anterior insula. No hemorrhagic conversion. CTA NECK: Critical stenosis/occlusion of the left internal carotid artery just distal to the bifurcation with distally diminished caliber but preserved patency. Correlation with carotid duplex sonography or conventional angiography would provide improved characterization of hemodynamics.

## 2019-06-01 NOTE — OCCUPATIONAL THERAPY INITIAL EVALUATION ADULT - PRECAUTIONS/LIMITATIONS, REHAB EVAL
Pt reportedly went to [facility] where he had a series of CT scans done but left AMA before resulting due to the prolonged wait. Pt then followed up with his outpt Neurologist for MRI brain which demonstrated acute L MCA distribution infarcts and L ICA critical stenosis. Pt reportedly went to [facility] where he had a series of CT scans done but left AMA before resulting due to the prolonged wait. Pt then followed up with his outpt Neurologist for MRI brain which demonstrated acute L MCA distribution infarcts and L ICA critical stenosis./no known precautions/limitations

## 2019-06-01 NOTE — PROGRESS NOTE ADULT - ASSESSMENT
74-year-old right-handed white gentleman first evaluated at [facility] on 5/31/19 with aphasia. He has been followed conservatively for several years with asymptomatic carotid stenosis. He had been on aspirin, but aspirin was stopped 3 weeks prior to admission due to hematuria. On 5/25/19, he developed virtual inability to generate speech, which was severely "garbled" for about 10 minutes. At the same time, he had difficulty putting on his pants but without obvious motor deficits. His severe speech difficulties lasted about 10 minutes, but he was left with residual mild word finding difficulty.    Impression. He had known bilateral asymptomatic carotid stenosis which had been followed conservatively. He had stopped taking aspirin about 3 weeks prior to admission due to hematuria. On 5/25/19, he developed probable motor aphasia and possibly apraxia, which subsequently improved but probably not quite back to baseline. His symptoms were due to acute left MCA infarction, most likely due to symptomatic severe left carotid stenosis.     NEURO: Continue close monitoring for neurologic deterioration, allow for permissive HTN in setting of ICA stenosis, continue on high dose statin, Carotid doppler showed 70% stenosis of the proximal left internal carotid artery. There is 50-69% stenosis of the proximal right internal carotid artery. Physical therapy/OT recommended home with no needs.     ANTITHROMBOTIC THERAPY: ASA/Plavix ; Plan for revascularization of his left carotid artery, probably endarterectomy or with stenting.     PULMONARY: protecting airway, saturating well     CARDIOVASCULAR: check TTE for cardiac clearence prior to stent placement. Continue cardiac monitoring. Dr. Han following.                             SBP goal: Permissive HTN to gradual normotension    GASTROINTESTINAL:  dysphagia screen passed       Diet: Regular    RENAL: BUN/Cr without acute change, good urine output      Na Goal: Greater than 135     Grace: No    HEMATOLOGY: H/H without acute change, Platelets 236     DVT ppx: LMWH    ID: afebrile, no leukocytosis     OTHER: Plan endorsed to patient at bedside, all questions and concerns addressed.     DISPOSITION: Home once carotid stent placed.    CORE MEASURES:        Admission NIHSS: 0     TPA: NO      LDL/HDL: 76/42     Depression Screen: 0     Statin Therapy: yes     Dysphagia Screen: PASS     Smoking NO      Afib NO     Stroke Education: pending    Obtain screening lower extremity venous ultrasound in patients who meet 1 or more of the following criteria as patient is high risk for DVT/PE on admission:   [] History of DVT/PE  []Hypercoagulable states (Factor V Leiden, Cancer, OCP, etc. )  []Prolonged immobility (hemiplegia/hemiparesis/post operative or any other extended immobilization)  [] Transferred from outside facility (Rehab or Long term care)  [] Age </= to 50 74-year-old right-handed white gentleman first evaluated at [facility] on 5/31/19 with aphasia. He has been followed conservatively for several years with asymptomatic carotid stenosis. He had been on aspirin, but aspirin was stopped 3 weeks prior to admission due to hematuria. On 5/25/19, he developed virtual inability to generate speech, which was severely "garbled" for about 10 minutes. At the same time, he had difficulty putting on his pants but without obvious motor deficits. His severe speech difficulties lasted about 10 minutes, but he was left with residual mild word finding difficulty.    Impression. He had known bilateral asymptomatic carotid stenosis which had been followed conservatively. He had stopped taking aspirin about 3 weeks prior to admission due to hematuria. On 5/25/19, he developed probable motor aphasia and possibly apraxia, which subsequently improved but probably not quite back to baseline. His symptoms were due to acute left MCA infarction, most likely due to symptomatic severe left carotid stenosis.     NEURO: Neurologically without acute change- remains nonfocal, Continue close monitoring for neurologic deterioration, allow for permissive HTN in setting of ICA stenosis, continue on high dose statin, Carotid doppler showed 70% stenosis of the proximal left internal carotid artery. There is 50-69% stenosis of the proximal right internal carotid artery. Physical therapy/OT recommended home with no needs.     ANTITHROMBOTIC THERAPY: ASA/Plavix ; Plan for revascularization of his left carotid artery, probably endarterectomy or with stenting.     PULMONARY: protecting airway, saturating well     CARDIOVASCULAR: check TTE for cardiac clearance prior to stent placement. Continue cardiac monitoring. Dr. Han following.                             SBP goal: Permissive HTN to gradual normotension    GASTROINTESTINAL:  dysphagia screen passed       Diet: Regular    RENAL: BUN/Cr without acute change, good urine output      Na Goal: Greater than 135     Grace: No    HEMATOLOGY: H/H without acute change, Platelets 236     DVT ppx: LMWH    ID: afebrile, no leukocytosis     OTHER: Plan endorsed to patient at bedside, all questions and concerns addressed.     DISPOSITION: Home once carotid stent placed.    CORE MEASURES:        Admission NIHSS: 0     TPA: NO      LDL/HDL: 76/42     Depression Screen: 0     Statin Therapy: yes     Dysphagia Screen: PASS     Smoking NO      Afib NO     Stroke Education: pending    Obtain screening lower extremity venous ultrasound in patients who meet 1 or more of the following criteria as patient is high risk for DVT/PE on admission:   [] History of DVT/PE  []Hypercoagulable states (Factor V Leiden, Cancer, OCP, etc. )  []Prolonged immobility (hemiplegia/hemiparesis/post operative or any other extended immobilization)  [] Transferred from outside facility (Rehab or Long term care)  [] Age </= to 50

## 2019-06-02 PROCEDURE — 93306 TTE W/DOPPLER COMPLETE: CPT | Mod: 26

## 2019-06-02 RX ORDER — ALPRAZOLAM 0.25 MG
1 TABLET ORAL ONCE
Refills: 0 | Status: DISCONTINUED | OUTPATIENT
Start: 2019-06-02 | End: 2019-06-03

## 2019-06-02 RX ADMIN — CLOPIDOGREL BISULFATE 75 MILLIGRAM(S): 75 TABLET, FILM COATED ORAL at 12:03

## 2019-06-02 RX ADMIN — ENOXAPARIN SODIUM 40 MILLIGRAM(S): 100 INJECTION SUBCUTANEOUS at 05:21

## 2019-06-02 RX ADMIN — ATORVASTATIN CALCIUM 80 MILLIGRAM(S): 80 TABLET, FILM COATED ORAL at 21:35

## 2019-06-02 RX ADMIN — Medication 325 MILLIGRAM(S): at 12:03

## 2019-06-02 NOTE — PROGRESS NOTE ADULT - SUBJECTIVE AND OBJECTIVE BOX
THE PATIENT WAS SEEN AND EXAMINED BY ME WITH THE HOUSESTAFF AND STROKE TEAM DURING MORNING ROUNDS.   HPI:  74-year-old right-handed white gentleman first evaluated at [facility] on 5/31/19 with aphasia. He has been followed conservatively for several years with asymptomatic carotid stenosis. He had been on aspirin, but aspirin was stopped 3 weeks prior to admission due to hematuria. On 5/25/19, he developed virtual inability to generate speech, which was severely "garbled" for about 10 minutes. At the same time, he had difficulty putting on his pants but without obvious motor deficits. His severe speech difficulties lasted about 10 minutes, but he was left with residual mild word finding difficulty. He was seen by Dr. Deepak Leone (neurology) who obtained an MRI and sent him to the ED. He has a history of hemochromatosis. He quit smoking about 15 years prior to admission. He is a retired [place] .     ROS otherwise negative.    SUBJECTIVE: No events overnight.  No new neurologic complaints.      aspirin 325 milliGRAM(s) Oral daily  atorvastatin 80 milliGRAM(s) Oral at bedtime  clopidogrel Tablet 75 milliGRAM(s) Oral daily  enoxaparin Injectable 40 milliGRAM(s) SubCutaneous every 24 hours  sodium chloride 0.9%. 1000 milliLiter(s) IV Continuous <Continuous>    PHYSICAL EXAM:   Vital Signs Last 24 Hrs  T(C): 36.3 (02 Jun 2019 05:17), Max: 36.9 (01 Jun 2019 14:00)  T(F): 97.4 (02 Jun 2019 05:17), Max: 98.4 (01 Jun 2019 14:00)  HR: 60 (02 Jun 2019 05:17) (60 - 99)  BP: 140/61 (02 Jun 2019 05:17) (123/61 - 165/63)  BP(mean): 79 (01 Jun 2019 16:00) (79 - 93)  RR: 16 (02 Jun 2019 05:17) (12 - 22)  SpO2: 95% (02 Jun 2019 05:17) (95% - 100%)    General: No acute distress  HEENT: EOM intact, visual fields full  Abdomen: Soft, nontender, nondistended   Extremities: No edema    NEUROLOGICAL EXAM:  Mental status: Alert and attentive; speech fluent, prosodic, with no word finding difficulty or paraphasic errors; followed crossed commands; repetition intact   Cranial Nerves: No facial asymmetry, no nystagmus, no dysarthria, tongue midline  Motor exam: extremities move well against gravity   Sensation: Intact to light touch   Coordination/ Gait: No dysmetria, gait not tested    LABS:                        14.5   7.0   )-----------( 236      ( 01 Jun 2019 05:39 )             43.9    06-01    138  |  102  |  19  ----------------------------<  107<H>  4.0   |  26  |  1.02    Ca    9.6      01 Jun 2019 05:39    Hemoglobin A1C, Whole Blood: 5.8 % (05-31 @ 10:15)  Hemoglobin A1C, Whole Blood: 5.8 % (05-31 @ 10:15)    IMAGING: Reviewed by me.     (05.31.19)  CT BRAIN: Subacute left middle cerebral artery infarct involving the left   anterior corona radiata, frontal operculum and anterior insula. No   hemorrhagic conversion.    CTA NECK:   Critical stenosis/occlusion of the left internal carotid artery just   distal to the bifurcation with distally diminished caliber but preserved   patency. Correlation with carotid duplex sonography or conventional   angiography would provide improved characterization of hemodynamics.  Moderate stenosis of the right internal carotid artery just distal to   the bifurcation.     CTA HEAD: No significant stenosis or occlusion of the major proximal   branches of the [place].

## 2019-06-02 NOTE — PROGRESS NOTE ADULT - SUBJECTIVE AND OBJECTIVE BOX
Subjective: Patient seen and examined. No new events except as noted.   feels ok         REVIEW OF SYSTEMS:    CONSTITUTIONAL: + weakness, fevers or chills  EYES/ENT: No visual changes;  No vertigo or throat pain   NECK: No pain or stiffness  RESPIRATORY: No cough, wheezing, hemoptysis; No shortness of breath  CARDIOVASCULAR: No chest pain or palpitations  GASTROINTESTINAL: No abdominal or epigastric pain. No nausea, vomiting, or hematemesis; No diarrhea or constipation. No melena or hematochezia.  GENITOURINARY: No dysuria, frequency or hematuria  NEUROLOGICAL: No numbness or weakness  SKIN: No itching, burning, rashes, or lesions   All other review of systems is negative unless indicated above.    MEDICATIONS:  MEDICATIONS  (STANDING):  aspirin 325 milliGRAM(s) Oral daily  atorvastatin 80 milliGRAM(s) Oral at bedtime  clopidogrel Tablet 75 milliGRAM(s) Oral daily  enoxaparin Injectable 40 milliGRAM(s) SubCutaneous every 24 hours  sodium chloride 0.9%. 1000 milliLiter(s) (75 mL/Hr) IV Continuous <Continuous>      PHYSICAL EXAM:  T(C): 36.5 (06-02-19 @ 08:23), Max: 36.9 (06-01-19 @ 14:00)  HR: 69 (06-02-19 @ 08:23) (60 - 99)  BP: 122/69 (06-02-19 @ 08:23) (122/69 - 165/63)  RR: 18 (06-02-19 @ 08:23) (16 - 22)  SpO2: 96% (06-02-19 @ 08:23) (95% - 98%)  Wt(kg): --  I&O's Summary    01 Jun 2019 07:01  -  02 Jun 2019 07:00  --------------------------------------------------------  IN: 360 mL / OUT: 0 mL / NET: 360 mL          Appearance: NAD	  HEENT:   Normal oral mucosa, PERRL, EOMI	  Lymphatic: No lymphadenopathy , no edema  Cardiovascular: Normal S1 S2, No JVD, No murmurs , Peripheral pulses palpable 2+ bilaterally  Respiratory: Lungs clear to auscultation, normal effort 	  Gastrointestinal:  Soft, Non-tender, + BS	  Skin: No rashes, No ecchymoses, No cyanosis, warm to touch  Musculoskeletal: Normal range of motion, normal strength  Psychiatry:  Mood & affect appropriate  Ext: No edema      LABS:    CARDIAC MARKERS:                                14.5   7.0   )-----------( 236      ( 01 Jun 2019 05:39 )             43.9     06-01    138  |  102  |  19  ----------------------------<  107<H>  4.0   |  26  |  1.02    Ca    9.6      01 Jun 2019 05:39      proBNP:   Lipid Profile:   HgA1c:   TSH:             TELEMETRY: SR	    ECG:  	  RADIOLOGY:   DIAGNOSTIC TESTING:  [ ] Echocardiogram:  [ ]  Catheterization:  [ ] Stress Test:    OTHER:

## 2019-06-02 NOTE — PROGRESS NOTE ADULT - ASSESSMENT
74-year-old right-handed white gentleman first evaluated at [facility] on 5/31/19 with aphasia. He has been followed conservatively for several years with asymptomatic carotid stenosis. He had been on aspirin, but aspirin was stopped 3 weeks prior to admission due to hematuria. On 5/25/19, he developed virtual inability to generate speech, which was severely "garbled" for about 10 minutes. At the same time, he had difficulty putting on his pants but without obvious motor deficits. His severe speech difficulties lasted about 10 minutes, but he was left with residual mild word finding difficulty.    Impression. He had known bilateral asymptomatic carotid stenosis which had been followed conservatively. He had stopped taking aspirin about 3 weeks prior to admission due to hematuria. On 5/25/19, he developed probable motor aphasia and possibly apraxia, which subsequently improved but probably not quite back to baseline. His symptoms were due to acute left MCA infarction, most likely due to symptomatic severe left carotid stenosis.     NEURO: Neurologically without acute change- remains nonfocal, Continue monitoring for neurologic deterioration, allow for permissive HTN in setting of ICA stenosis, continue on high dose statin, Carotid doppler showed 70% stenosis of the proximal left internal carotid artery. There is 50-69% stenosis of the proximal right internal carotid artery. Physical therapy/OT recommended home with no needs.     ANTITHROMBOTIC THERAPY: ASA/Plavix ; Plan for revascularization of his left carotid artery, probably with carotid stenting.     PULMONARY: protecting airway, saturating well     CARDIOVASCULAR: check TTE for cardiac clearance prior to stent placement. Continue cardiac monitoring. Dr. Han following.                             SBP goal: Permissive HTN to gradual normotension    GASTROINTESTINAL:  dysphagia screen passed       Diet: Regular, NPO after midnight for procedure.     RENAL: BUN/Cr previously within goal range, good urine output      Na Goal: Greater than 135     Grace: No    HEMATOLOGY: no signs or symptoms of bleeding noted. AM labs ordered prior to procedure.      DVT ppx: LMWH    ID: afebrile, no leukocytosis     OTHER: Plan endorsed to patient at bedside, all questions and concerns addressed. Candidate for STROKE AF.     DISPOSITION: Home once carotid stent placed.    CORE MEASURES:        Admission NIHSS: 0     TPA: NO      LDL/HDL: 76/42     Depression Screen: 0     Statin Therapy: yes     Dysphagia Screen: PASS     Smoking NO      Afib NO     Stroke Education: pending    Obtain screening lower extremity venous ultrasound in patients who meet 1 or more of the following criteria as patient is high risk for DVT/PE on admission:   [] History of DVT/PE  []Hypercoagulable states (Factor V Leiden, Cancer, OCP, etc. )  []Prolonged immobility (hemiplegia/hemiparesis/post operative or any other extended immobilization)  [] Transferred from outside facility (Rehab or Long term care)  [] Age </= to 50 74-year-old right-handed white gentleman first evaluated at [facility] on 5/31/19 with aphasia. He has been followed conservatively for several years with asymptomatic carotid stenosis. He had been on aspirin, but aspirin was stopped 3 weeks prior to admission due to hematuria. On 5/25/19, he developed virtual inability to generate speech, which was severely "garbled" for about 10 minutes. At the same time, he had difficulty putting on his pants but without obvious motor deficits. His severe speech difficulties lasted about 10 minutes, but he was left with residual mild word finding difficulty.    Impression. He had known bilateral asymptomatic carotid stenosis which had been followed conservatively. He had stopped taking aspirin about 3 weeks prior to admission due to hematuria. On 5/25/19, he developed probable motor aphasia and possibly apraxia, which subsequently improved but probably not quite back to baseline. His symptoms were due to acute left MCA infarction, most likely due to symptomatic severe left carotid stenosis.     NEURO: Neurologically without acute change- remains nonfocal, Continue monitoring for neurologic deterioration, allow for permissive HTN in setting of ICA stenosis, continue on high dose statin, Carotid doppler showed 70% stenosis of the proximal left internal carotid artery. There is 50-69% stenosis of the proximal right internal carotid artery. Physical therapy/OT recommended home with no needs.     ANTITHROMBOTIC THERAPY: ASA/Plavix ; [id number] in AM. Plan for revascularization of his left carotid artery, probably with carotid stenting.     PULMONARY: protecting airway, saturating well     CARDIOVASCULAR: TTE 65-50%. Continue cardiac monitoring. Dr. Han following.                             SBP goal: Permissive HTN to gradual normotension    GASTROINTESTINAL:  dysphagia screen passed       Diet: Regular, NPO after midnight for procedure.     RENAL: BUN/Cr previously within goal range, good urine output      Na Goal: Greater than 135     Grace: No    HEMATOLOGY: no signs or symptoms of bleeding noted. AM labs ordered prior to procedure.      DVT ppx: LMWH    ID: afebrile, no sign of infection    OTHER: Plan endorsed to patient at bedside, all questions and concerns addressed. Candidate for STROKE AF.     DISPOSITION: Home once carotid stent placed.    CORE MEASURES:        Admission NIHSS: 0     TPA: NO      LDL/HDL: 76/42     Depression Screen: 0     Statin Therapy: yes     Dysphagia Screen: PASS     Smoking NO      Afib NO     Stroke Education: pending    Obtain screening lower extremity venous ultrasound in patients who meet 1 or more of the following criteria as patient is high risk for DVT/PE on admission:   [] History of DVT/PE  []Hypercoagulable states (Factor V Leiden, Cancer, OCP, etc. )  []Prolonged immobility (hemiplegia/hemiparesis/post operative or any other extended immobilization)  [] Transferred from outside facility (Rehab or Long term care)  [] Age </= to 50

## 2019-06-03 ENCOUNTER — APPOINTMENT (OUTPATIENT)
Age: 74
End: 2019-06-03
Payer: MEDICARE

## 2019-06-03 LAB
ANION GAP SERPL CALC-SCNC: 11 MMOL/L — SIGNIFICANT CHANGE UP (ref 5–17)
BLD GP AB SCN SERPL QL: NEGATIVE — SIGNIFICANT CHANGE UP
BUN SERPL-MCNC: 14 MG/DL — SIGNIFICANT CHANGE UP (ref 7–23)
CALCIUM SERPL-MCNC: 9.6 MG/DL — SIGNIFICANT CHANGE UP (ref 8.4–10.5)
CHLORIDE SERPL-SCNC: 105 MMOL/L — SIGNIFICANT CHANGE UP (ref 96–108)
CO2 SERPL-SCNC: 24 MMOL/L — SIGNIFICANT CHANGE UP (ref 22–31)
CREAT SERPL-MCNC: 1.1 MG/DL — SIGNIFICANT CHANGE UP (ref 0.5–1.3)
GLUCOSE SERPL-MCNC: 108 MG/DL — HIGH (ref 70–99)
HCT VFR BLD CALC: 43.3 % — SIGNIFICANT CHANGE UP (ref 39–50)
HGB BLD-MCNC: 14.5 G/DL — SIGNIFICANT CHANGE UP (ref 13–17)
MCHC RBC-ENTMCNC: 30.7 PG — SIGNIFICANT CHANGE UP (ref 27–34)
MCHC RBC-ENTMCNC: 33.5 GM/DL — SIGNIFICANT CHANGE UP (ref 32–36)
MCV RBC AUTO: 91.7 FL — SIGNIFICANT CHANGE UP (ref 80–100)
PA ADP PRP-ACNC: 130 PRU — LOW (ref 194–417)
PLATELET # BLD AUTO: 205 K/UL — SIGNIFICANT CHANGE UP (ref 150–400)
POTASSIUM SERPL-MCNC: 4.1 MMOL/L — SIGNIFICANT CHANGE UP (ref 3.5–5.3)
POTASSIUM SERPL-SCNC: 4.1 MMOL/L — SIGNIFICANT CHANGE UP (ref 3.5–5.3)
RBC # BLD: 4.72 M/UL — SIGNIFICANT CHANGE UP (ref 4.2–5.8)
RBC # FLD: 13.1 % — SIGNIFICANT CHANGE UP (ref 10.3–14.5)
RH IG SCN BLD-IMP: POSITIVE — SIGNIFICANT CHANGE UP
RH IG SCN BLD-IMP: POSITIVE — SIGNIFICANT CHANGE UP
SODIUM SERPL-SCNC: 140 MMOL/L — SIGNIFICANT CHANGE UP (ref 135–145)
WBC # BLD: 7.11 K/UL — SIGNIFICANT CHANGE UP (ref 3.8–10.5)
WBC # FLD AUTO: 7.11 K/UL — SIGNIFICANT CHANGE UP (ref 3.8–10.5)

## 2019-06-03 PROCEDURE — 37215 TRANSCATH STENT CCA W/EPS: CPT | Mod: LT

## 2019-06-03 PROCEDURE — 36224 PLACE CATH CAROTD ART: CPT | Mod: 59,RT

## 2019-06-03 PROCEDURE — 36226 PLACE CATH VERTEBRAL ART: CPT | Mod: 50

## 2019-06-03 RX ORDER — OXYCODONE HYDROCHLORIDE 5 MG/1
10 TABLET ORAL EVERY 4 HOURS
Refills: 0 | Status: DISCONTINUED | OUTPATIENT
Start: 2019-06-03 | End: 2019-06-04

## 2019-06-03 RX ORDER — SENNA PLUS 8.6 MG/1
2 TABLET ORAL AT BEDTIME
Refills: 0 | Status: DISCONTINUED | OUTPATIENT
Start: 2019-06-03 | End: 2019-06-04

## 2019-06-03 RX ORDER — DOCUSATE SODIUM 100 MG
100 CAPSULE ORAL THREE TIMES A DAY
Refills: 0 | Status: DISCONTINUED | OUTPATIENT
Start: 2019-06-03 | End: 2019-06-04

## 2019-06-03 RX ORDER — OXYCODONE HYDROCHLORIDE 5 MG/1
5 TABLET ORAL EVERY 4 HOURS
Refills: 0 | Status: DISCONTINUED | OUTPATIENT
Start: 2019-06-03 | End: 2019-06-04

## 2019-06-03 RX ORDER — ONDANSETRON 8 MG/1
4 TABLET, FILM COATED ORAL ONCE
Refills: 0 | Status: DISCONTINUED | OUTPATIENT
Start: 2019-06-03 | End: 2019-06-04

## 2019-06-03 RX ADMIN — Medication 325 MILLIGRAM(S): at 12:10

## 2019-06-03 RX ADMIN — ENOXAPARIN SODIUM 40 MILLIGRAM(S): 100 INJECTION SUBCUTANEOUS at 12:39

## 2019-06-03 RX ADMIN — ATORVASTATIN CALCIUM 80 MILLIGRAM(S): 80 TABLET, FILM COATED ORAL at 22:00

## 2019-06-03 RX ADMIN — Medication 1 MILLIGRAM(S): at 20:56

## 2019-06-03 RX ADMIN — CLOPIDOGREL BISULFATE 75 MILLIGRAM(S): 75 TABLET, FILM COATED ORAL at 12:10

## 2019-06-03 NOTE — CHART NOTE - NSCHARTNOTEFT_GEN_A_CORE
Interventional Neuro- Radiology   Procedure Note PA-C    Procedure: Selective Cerebral Angiography and Left Carotid Wall stent placement    Pre- Procedure Diagnosis: left carotid stenosis   Post- Procedure Diagnosis: severe left carotid stenosis     : Dr. Sourav Esquivel  Fellow:     Dr Justa Kay  Resident: Dr Rony Chu  Physician Assistant: Winter Albarran PA-C    Nurse:                   Nancy Ozuna RN  Radiologic Tech:   Hugo London LRT       Caroline Quijano LRT  Anesthesiologist:   Dr Griffin Shea   Sheath:                 5 [place] long      I/Os:  E less than 10cc  IV fluids:300cc  Urine output 0cc  Contrast Omnipaque 240 133cc          Antibiotics: Ancef 2 grams    Heparin 4,000 units IV push    Vitals: /64        HR 95     Spo2 99%    Preliminary Report:  Using a 5 [place] long sheath to the right groin under MAC sedation via left vertebral artery, left common carotid artery, left external carotid artery, right vertebral artery, right common carotid artery, right external carotid artery a selective cerebral angiography was performed and demonstrated severe left carotid stenosis. Patient underwent a successful left carotid wall stent placement. Official note to follow.   Patient tolerated procedure well, hemodynamically stable, no change in neurological status compared to baseline. Results discussed with neuro ICU team, patient and patient's family. Right groin sheath was removed, a star close and manual compression held to hemostasis for 20 minutes, no active bleeding, no hematoma, quick clot and safeguard balloon dressing applied at 2315. Patient transported to Recovery Room on 2nd floor. Dopplers pending for tomorrow. Interventional Neuro- Radiology   Procedure Note PA-C    Procedure: Selective Cerebral Angiography and Left Carotid Wall stent placement    Pre- Procedure Diagnosis: left carotid stenosis   Post- Procedure Diagnosis: severe left carotid stenosis     : Dr. Sourav Esquivel  Fellow:     Dr Justa Kay  Resident: Dr Rony Chu  Physician Assistant: Winter Albarran PA-C    Nurse:                   Nancy Ozuna RN  Radiologic Tech:   Hugo London LRT       Caroline Quijano LRT  Anesthesiologist:   Dr Griffin Shea   Sheath:                  5 [place] long      I/Os:  E less than 10cc  IV fluids:300cc  Urine output 0cc  Contrast Omnipaque 240 133cc          Antibiotics: Ancef 2 grams    Heparin 4,000 units IV push    Vitals: /64        HR 95     Spo2 99%    Preliminary Report:  Using a 5 [place] long sheath to the right groin under MAC sedation via left vertebral artery, left common carotid artery, left external carotid artery, right vertebral artery, right common carotid artery, right external carotid artery a selective cerebral angiography was performed and demonstrated severe left carotid stenosis. Patient underwent a successful left carotid wall stent placement. Official note to follow.   Patient tolerated procedure well, hemodynamically stable, no change in neurological status compared to baseline. Results discussed with neuro ICU team, patient and patient's family. Right groin sheath was removed, a star close and manual compression held to hemostasis for 20 minutes, no active bleeding, no hematoma, quick clot and safeguard balloon dressing applied at 2315. Patient transported to Recovery Room on 2nd floor. Dopplers pending for tomorrow.

## 2019-06-03 NOTE — PROGRESS NOTE ADULT - ASSESSMENT
74-year-old right-handed white gentleman first evaluated at [facility] on 5/31/19 with aphasia.  He had known bilateral asymptomatic carotid stenosis which had been followed conservatively. He had stopped taking aspirin about 3 weeks prior to admission due to hematuria. On 5/25/19, he developed probable motor aphasia and possibly apraxia, which subsequently improved but probably not quite back to baseline. His symptoms were due to acute left MCA infarction, most likely secondary to symptomatic severe left carotid stenosis.     NEURO: Neurologically without acute change, Continue monitoring for neurologic deterioration, allow for permissive HTN in setting of symptomatic ICA stenosis, continue on high dose statin, Carotid doppler showed 70% stenosis of the proximal left internal carotid artery. There is 50-69% stenosis of the proximal right internal carotid artery. Plan for revascularization of his left carotid artery, probably with carotid stenting. Physical therapy/OT recommended home with no needs.     ANTITHROMBOTIC THERAPY: ASA/Plavix ; [id number] 130.     PULMONARY: protecting airway, saturating well     CARDIOVASCULAR: TTE 65-50%. mitral annular calcification, minimal MR, Continue cardiac monitoring. Dr. Han following. Candidate for STROKE AF.                               SBP goal: Permissive HTN to gradual normotension    GASTROINTESTINAL:  dysphagia screen passed, tolerating diet      Diet: Regular, NPO after midnight for procedure.     RENAL: BUN/Cr previously within goal range, good urine output      Na Goal: Greater than 135     Grace: No    HEMATOLOGY: no signs or symptoms of bleeding noted.  Noted with hx of hemochromatosis- heme follow up as outpatient with all routine outpatient malignancy screenings.     DVT ppx: LMWH    ID: afebrile, no sign/sx of infection    OTHER: Plan endorsed to patient at bedside, all questions and concerns addressed.     DISPOSITION: Home with no needs.     CORE MEASURES:        Admission NIHSS: 0     TPA: NO      LDL/HDL: 76/42     Depression Screen: 0     Statin Therapy: yes     Dysphagia Screen: PASS     Smoking NO      Afib NO     Stroke Education: pending    Obtain screening lower extremity venous ultrasound in patients who meet 1 or more of the following criteria as patient is high risk for DVT/PE on admission:   [] History of DVT/PE  []Hypercoagulable states (Factor V Leiden, Cancer, OCP, etc. )  []Prolonged immobility (hemiplegia/hemiparesis/post operative or any other extended immobilization)  [] Transferred from outside facility (Rehab or Long term care)  [] Age </= to 50 74-year-old right-handed white gentleman first evaluated at [facility] on 5/31/19 with aphasia.  He had known bilateral asymptomatic carotid stenosis which had been followed conservatively. He had stopped taking aspirin about 3 weeks prior to admission due to hematuria. On 5/25/19, he developed probable motor aphasia and possibly apraxia, which subsequently improved but probably not quite back to baseline. His symptoms were due to acute left MCA infarction, most likely secondary to symptomatic severe left carotid stenosis.     NEURO: Neurologically without acute change, Continue monitoring for neurologic deterioration, allow for permissive HTN in setting of symptomatic ICA stenosis, continue on high dose statin, Carotid doppler showed 70% stenosis of the proximal left internal carotid artery. There is 50-69% stenosis of the proximal right internal carotid artery. Plan for revascularization of his left carotid artery, probably with carotid stenting. Physical therapy/OT recommended home with no needs.     ANTITHROMBOTIC THERAPY: ASA/Plavix ; [id number] 130.     PULMONARY: protecting airway, saturating well     CARDIOVASCULAR: TTE 65-50%. mitral annular calcification, minimal MR, Continue cardiac monitoring. Dr. Han following. Candidate for STROKE AF.                               SBP goal: Permissive HTN to gradual normotension    GASTROINTESTINAL:  dysphagia screen passed, tolerating diet      Diet: Regular, NPO after midnight for procedure.     RENAL: BUN/Cr within goal range, good urine output , maintain adequate hydration      Na Goal: Greater than 135     Grace: No    HEMATOLOGY: no signs or symptoms of bleeding noted.  Noted with hx of hemochromatosis- heme follow up as outpatient with all routine outpatient malignancy screenings.     DVT ppx: LMWH    ID: afebrile, no sign/sx of infection    OTHER: Plan endorsed to patient at bedside, all questions and concerns addressed.     DISPOSITION: Home with no needs.     CORE MEASURES:        Admission NIHSS: 0     TPA: NO      LDL/HDL: 76/42     Depression Screen: 0     Statin Therapy: yes     Dysphagia Screen: PASS     Smoking NO      Afib NO     Stroke Education: pending    Obtain screening lower extremity venous ultrasound in patients who meet 1 or more of the following criteria as patient is high risk for DVT/PE on admission:   [] History of DVT/PE  []Hypercoagulable states (Factor V Leiden, Cancer, OCP, etc. )  []Prolonged immobility (hemiplegia/hemiparesis/post operative or any other extended immobilization)  [] Transferred from outside facility (Rehab or Long term care)  [] Age </= to 50 74-year-old right-handed white gentleman first evaluated at [facility] on 5/31/19 with aphasia.  He had known bilateral asymptomatic carotid stenosis which had been followed conservatively. He had stopped taking aspirin about 3 weeks prior to admission due to hematuria. On 5/26/19, he developed probable motor aphasia and possibly apraxia, which subsequently improved but probably not quite back to baseline. His symptoms were due to acute left MCA infarction, most likely secondary to symptomatic severe left carotid stenosis.     NEURO: Neurologically without acute change, Continue monitoring for neurologic deterioration, allow for permissive HTN in setting of symptomatic ICA stenosis, continue on high dose statin, Carotid doppler showed 70% stenosis of the proximal left internal carotid artery. There is 50-69% stenosis of the proximal right internal carotid artery. Plan for revascularization of his left carotid artery, probably with carotid stenting. Physical therapy/OT recommended home with no needs.     ANTITHROMBOTIC THERAPY: ASA/Plavix ; [id number] 130.     PULMONARY: protecting airway, saturating well     CARDIOVASCULAR: TTE 65-50%. mitral annular calcification, minimal MR, Continue cardiac monitoring. Dr. Han following. Candidate for STROKE AF.                               SBP goal: Permissive HTN to gradual normotension    GASTROINTESTINAL:  dysphagia screen passed, tolerating diet      Diet: Regular, NPO after midnight for procedure.     RENAL: BUN/Cr within goal range, good urine output , maintain adequate hydration      Na Goal: Greater than 135     Grace: No    HEMATOLOGY: no signs or symptoms of bleeding noted.  Noted with hx of hemochromatosis- heme follow up as outpatient with all routine outpatient malignancy screenings.     DVT ppx: LMWH    ID: afebrile, no sign/sx of infection    OTHER: Plan endorsed to patient at bedside, all questions and concerns addressed.     DISPOSITION: Home with no needs.     CORE MEASURES:        Admission NIHSS: 0     TPA: NO      LDL/HDL: 76/42     Depression Screen: 0     Statin Therapy: yes     Dysphagia Screen: PASS     Smoking NO      Afib NO     Stroke Education: pending    Obtain screening lower extremity venous ultrasound in patients who meet 1 or more of the following criteria as patient is high risk for DVT/PE on admission:   [] History of DVT/PE  []Hypercoagulable states (Factor V Leiden, Cancer, OCP, etc. )  []Prolonged immobility (hemiplegia/hemiparesis/post operative or any other extended immobilization)  [] Transferred from outside facility (Rehab or Long term care)  [] Age </= to 50

## 2019-06-03 NOTE — PROGRESS NOTE ADULT - ASSESSMENT
Summary: 74 year old M s/p L carotid wall stenting    NEURO:  q1h neuro checks  Stroke core measures: A1c, LDL  on , and Plavix 75 daily, per NSGY  Carotid dopplers tomorrow, per Neuro IR  OOB as tolerated; possible DC after carotid dopplers tomorrow    CARDS:  -150  HLD - Contd on Atorvastatin 80 qHS    PULM:  sat > 92%  Incentive spirometry    RENAL:  IVF until good po intake    GASTRO:  advance as tolerated  Bowel regimen  ---> Stress ulcer prophylaxis:  Not indicated    HEME:    ---> DVT prophylaxis: SCDs, hold anticoagulation, Lovenox 40 sc qHS    ENDO:  euglycemia    ID:  afebrile    Code status:  Full code  Disposition:  ICU

## 2019-06-03 NOTE — CHART NOTE - NSCHARTNOTEFT_GEN_A_CORE
Interventional Neuro Radiology  Pre-Procedure Note PA-C    This is a 74 year old right hand dominant  male with past medical history of Hemachromatosis w/ bi-annual phlebotomies (next due in 2 weeks), HTN, HLD, Bladder Cancer, Bilateral Carotid stenosis on Full dose Aspirin presents to the ED sent in by private Neurologist (Dr. Deepak Sanford) for acute stroke on MRI.   Patient states that on 5/26, he was trying to put on pants when he noticed himself having trouble. Patient was unable to figure out how to put his pants on and then called his wife who noted he had difficulty expressing what he was trying to say. This lasted about 7-10 minutes before improving (unclear if resolved). Patient then reportedly went to [facility] where he had a series of CT scans done but left AMA before resulting due to the prolonged wait. Patient then followed up with his outpatient Neurologist for MRI brain which demonstrated acute L MCA distribution infarcts and L ICA critical stenosis.   At the time of my interview patient denies any complaints. Denies numbness, tingling, word finding difficulties, unilateral weakness.     PMHX: hemochromatosis, malignant neoplasm of bladder wall  Ex-heavy cigarette smoker (20-39 per day)  Osteoarthritis  Hyperlipemia  Hypertension  cardiac cath > 7 yrs. ago-neg-  at [facility]  H/O arthroscopy of knee- 2009  History of cystoscopy w/biopsy X 2 in 200            Social History:   FAMILY HISTORY:    Current Medications: aspirin 325 milliGRAM(s) Oral daily  atorvastatin 80 milliGRAM(s) Oral at bedtime  clopidogrel Tablet 75 milliGRAM(s) Oral daily  enoxaparin Injectable 40 milliGRAM(s) SubCutaneous every 24 hours  sodium chloride 0.9%. 1000 milliLiter(s) IV Continuous     CBC                        14.5   7.11  )-----------( 205      ( 03 Jun 2019 08:14 )             43.3       BMP    140  |  105  |  14  ----------------------------<  108<H>  4.1   |  24  |  1.10      Blood Bank: A positive available       Assessment/Plan:   This is a 74 year old right hand dominant male   Procedure, goals, risks, benefits and alternatives were discussed with patient and patient's family. All questions were answered.  Risks include but are not limited to stroke, vessel injury, hemorrhage, and or right  groin hematoma. Patient demonstrates understanding of all risks involved with this procedure and wishes to continue.  Appropriate content was obtained from patient and consent is in the patient's chart.

## 2019-06-03 NOTE — PROGRESS NOTE ADULT - SUBJECTIVE AND OBJECTIVE BOX
THE PATIENT WAS SEEN AND EXAMINED BY ME WITH THE HOUSESTAFF AND STROKE TEAM DURING MORNING ROUNDS.   HPI:  74-year-old right-handed white gentleman first evaluated at [facility] on 5/31/19 with aphasia. He has been followed conservatively for several years with asymptomatic carotid stenosis. He had been on aspirin, but aspirin was stopped 3 weeks prior to admission due to hematuria. On 5/25/19, he developed virtual inability to generate speech, which was severely "garbled" for about 10 minutes. At the same time, he had difficulty putting on his pants but without obvious motor deficits. His severe speech difficulties lasted about 10 minutes, but he was left with residual mild word finding difficulty. He was seen by Dr. Deepak Leone (neurology) who obtained an MRI and sent him to the ED. He has a history of hemochromatosis. He quit smoking about 15 years prior to admission. He is a retired [place] .     SUBJECTIVE: No events overnight.  No new neurologic complaints.      ALPRAZolam 1 milliGRAM(s) Oral once  aspirin 325 milliGRAM(s) Oral daily  atorvastatin 80 milliGRAM(s) Oral at bedtime  clopidogrel Tablet 75 milliGRAM(s) Oral daily  enoxaparin Injectable 40 milliGRAM(s) SubCutaneous every 24 hours  sodium chloride 0.9%. 1000 milliLiter(s) IV Continuous <Continuous>      PHYSICAL EXAM:   Vital Signs Last 24 Hrs  T(C): 36.5 (03 Jun 2019 05:17), Max: 36.9 (02 Jun 2019 16:40)  T(F): 97.7 (03 Jun 2019 05:17), Max: 98.4 (02 Jun 2019 16:40)  HR: 70 (03 Jun 2019 05:17) (68 - 77)  BP: 131/72 (03 Jun 2019 05:17) (122/69 - 162/70)  BP(mean): --  RR: 18 (03 Jun 2019 05:17) (16 - 18)  SpO2: 97% (03 Jun 2019 05:17) (94% - 97%)      General: No acute distress  HEENT: EOM intact, visual fields full  Abdomen: Soft, nontender, nondistended   Extremities: No edema    NEUROLOGICAL EXAM:  Mental status: Alert and attentive; speech fluent, prosodic, with no word finding difficulty or paraphasic errors; followed crossed commands; repetition intact   Cranial Nerves: No facial asymmetry, no nystagmus, no dysarthria, tongue midline  Motor exam: extremities move well against gravity   Sensation: Intact to light touch   Coordination/ Gait: No dysmetria, gait not tested      LABS:         Hemoglobin A1C, Whole Blood: 5.8 % (05-31 @ 10:15)  Hemoglobin A1C, Whole Blood: 5.8 % (05-31 @ 10:15)      IMAGING: Reviewed by me.   (05.31.19)  CT BRAIN: Subacute left middle cerebral artery infarct involving the left   anterior corona radiata, frontal operculum and anterior insula. No   hemorrhagic conversion.    CTA NECK:   Critical stenosis/occlusion of the left internal carotid artery just   distal to the bifurcation with distally diminished caliber but preserved   patency. Correlation with carotid duplex sonography or conventional   angiography would provide improved characterization of hemodynamics.  Moderate stenosis of the right internal carotid artery just distal to   the bifurcation.     CTA HEAD: No significant stenosis or occlusion of the major proximal   branches of the [place]. THE PATIENT WAS SEEN AND EXAMINED BY ME WITH THE HOUSESTAFF AND STROKE TEAM DURING MORNING ROUNDS.   HPI:  74-year-old right-handed white gentleman first evaluated at [facility] on 5/31/19 with aphasia. He has been followed conservatively for several years with asymptomatic carotid stenosis. He had been on aspirin, but aspirin was stopped 3 weeks prior to admission due to hematuria. On 5/25/19, he developed virtual inability to generate speech, which was severely "garbled" for about 10 minutes. At the same time, he had difficulty putting on his pants but without obvious motor deficits. His severe speech difficulties lasted about 10 minutes, but he was left with residual mild word finding difficulty. He was seen by Dr. Deepak Leone (neurology) who obtained an MRI and sent him to the ED. He has a history of hemochromatosis. He quit smoking about 15 years prior to admission. He is a retired [place] .     SUBJECTIVE: No events overnight.  No new neurologic complaints.      ALPRAZolam 1 milliGRAM(s) Oral once  aspirin 325 milliGRAM(s) Oral daily  atorvastatin 80 milliGRAM(s) Oral at bedtime  clopidogrel Tablet 75 milliGRAM(s) Oral daily  enoxaparin Injectable 40 milliGRAM(s) SubCutaneous every 24 hours  sodium chloride 0.9%. 1000 milliLiter(s) IV Continuous <Continuous>      PHYSICAL EXAM:   Vital Signs Last 24 Hrs  T(C): 36.5 (03 Jun 2019 05:17), Max: 36.9 (02 Jun 2019 16:40)  T(F): 97.7 (03 Jun 2019 05:17), Max: 98.4 (02 Jun 2019 16:40)  HR: 70 (03 Jun 2019 05:17) (68 - 77)  BP: 131/72 (03 Jun 2019 05:17) (122/69 - 162/70)  BP(mean): --  RR: 18 (03 Jun 2019 05:17) (16 - 18)  SpO2: 97% (03 Jun 2019 05:17) (94% - 97%)      General: No acute distress  HEENT: EOM intact, visual fields full  Abdomen: Soft, nontender, nondistended   Extremities: No edema    NEUROLOGICAL EXAM:  Mental status: Alert and attentive; speech fluent, , with no word finding difficulty or paraphasic errors; followed crossed commands; repetition intact   Cranial Nerves: No facial asymmetry, no nystagmus, no dysarthria, tongue midline  Motor exam: extremities move well against gravity   Sensation: Intact to light touch   Coordination/ Gait: No dysmetria, gait not tested      LABS:         Hemoglobin A1C, Whole Blood: 5.8 % (05-31 @ 10:15)  Hemoglobin A1C, Whole Blood: 5.8 % (05-31 @ 10:15)      IMAGING: Reviewed by me.   (05.31.19)  CT BRAIN: Subacute left middle cerebral artery infarct involving the left   anterior corona radiata, frontal operculum and anterior insula. No   hemorrhagic conversion.    CTA NECK:   Critical stenosis/occlusion of the left internal carotid artery just   distal to the bifurcation with distally diminished caliber but preserved   patency. Correlation with carotid duplex sonography or conventional   angiography would provide improved characterization of hemodynamics.  Moderate stenosis of the right internal carotid artery just distal to   the bifurcation.     CTA HEAD: No significant stenosis or occlusion of the major proximal   branches of the [place]. THE PATIENT WAS SEEN AND EXAMINED BY ME WITH THE HOUSESTAFF AND STROKE TEAM DURING MORNING ROUNDS.   HPI:  74-year-old right-handed white gentleman first evaluated at [facility] on 5/31/19 with aphasia. He has been followed conservatively for several years with asymptomatic carotid stenosis. He had been on aspirin, but aspirin was stopped 3 weeks prior to admission due to hematuria. On 5/26/19, he developed virtual inability to generate speech, which was severely "garbled" for about 10 minutes. At the same time, he had difficulty putting on his pants but without obvious motor deficits. His severe speech difficulties lasted about 10 minutes, but he was left with residual mild word finding difficulty. He was seen by Dr. Deepak Leone (neurology) who obtained an MRI and sent him to the ED. He has a history of hemochromatosis. He quit smoking about 15 years prior to admission. He is a retired [place] .     SUBJECTIVE: No events overnight.  No new neurologic complaints.      ALPRAZolam 1 milliGRAM(s) Oral once  aspirin 325 milliGRAM(s) Oral daily  atorvastatin 80 milliGRAM(s) Oral at bedtime  clopidogrel Tablet 75 milliGRAM(s) Oral daily  enoxaparin Injectable 40 milliGRAM(s) SubCutaneous every 24 hours  sodium chloride 0.9%. 1000 milliLiter(s) IV Continuous <Continuous>      PHYSICAL EXAM:   Vital Signs Last 24 Hrs  T(C): 36.5 (03 Jun 2019 05:17), Max: 36.9 (02 Jun 2019 16:40)  T(F): 97.7 (03 Jun 2019 05:17), Max: 98.4 (02 Jun 2019 16:40)  HR: 70 (03 Jun 2019 05:17) (68 - 77)  BP: 131/72 (03 Jun 2019 05:17) (122/69 - 162/70)  BP(mean): --  RR: 18 (03 Jun 2019 05:17) (16 - 18)  SpO2: 97% (03 Jun 2019 05:17) (94% - 97%)      General: No acute distress  HEENT: EOM intact, visual fields full  Abdomen: Soft, nontender, nondistended   Extremities: No edema    NEUROLOGICAL EXAM:  Mental status: Alert and attentive; speech fluent, , with no word finding difficulty or paraphasic errors; followed crossed commands; repetition intact   Cranial Nerves: No facial asymmetry, no nystagmus, no dysarthria, tongue midline  Motor exam: extremities move well against gravity   Sensation: Intact to light touch   Coordination/ Gait: No dysmetria, gait not tested      LABS:         Hemoglobin A1C, Whole Blood: 5.8 % (05-31 @ 10:15)  Hemoglobin A1C, Whole Blood: 5.8 % (05-31 @ 10:15)      IMAGING: Reviewed by me.   (05.31.19)  CT BRAIN: Subacute left middle cerebral artery infarct involving the left   anterior corona radiata, frontal operculum and anterior insula. No   hemorrhagic conversion.    CTA NECK:   Critical stenosis/occlusion of the left internal carotid artery just   distal to the bifurcation with distally diminished caliber but preserved   patency. Correlation with carotid duplex sonography or conventional   angiography would provide improved characterization of hemodynamics.  Moderate stenosis of the right internal carotid artery just distal to   the bifurcation.     CTA HEAD: No significant stenosis or occlusion of the major proximal   branches of the [place].

## 2019-06-03 NOTE — PROGRESS NOTE ADULT - SUBJECTIVE AND OBJECTIVE BOX
SUMMARY: 75 y/o RH  male with past medical history of Hemachromatosis w/ bi-annual phlebotomies (next due in 2 weeks from admission), HTN, HLD, Bladder Cancer, Bilateral Carotid stenosis on Full dose Aspirin presents to the ED sent in by private Neurologist (Dr. Deepak Sanford) for acute L MCA distribution infarcts and critical L ICA stenosis    PAST MEDICAL & SURGICAL HISTORY:  Hemochromatosis  Malignant neoplasm of bladder wall  Ex-heavy cigarette smoker (20-39 per day)  Osteoarthritis  Hyperlipemia  Hypertension  cardiac cath > 7 yrs. ago-neg-  at [facility]  H/O arthroscopy of knee- 2009  History of cystoscopy w/biopsy X 2 in 2004    On admission, the patient was:  GCS: 15  NIHSS:    VITALS:  T(C): , Max: 36.7 (06-03-19 @ 07:06)  HR:  (62 - 73)  BP:  (131/72 - 155/77)  ABP: --  RR:  (18 - 18)  SpO2:  (93% - 98%)  Wt(kg): --      LABS:  Na: 140 (06-03 @ 05:58), 138 (06-01 @ 05:39)  K: 4.1 (06-03 @ 05:58), 4.0 (06-01 @ 05:39)  Cl: 105 (06-03 @ 05:58), 102 (06-01 @ 05:39)  CO2: 24 (06-03 @ 05:58), 26 (06-01 @ 05:39)  BUN: 14 (06-03 @ 05:58), 19 (06-01 @ 05:39)  Cr: 1.10 (06-03 @ 05:58), 1.02 (06-01 @ 05:39)  Glu: 108(06-03 @ 05:58), 107(06-01 @ 05:39)    Hgb: 14.5 (06-03 @ 08:14), 14.5 (06-01 @ 05:39)  Hct: 43.3 (06-03 @ 08:14), 43.9 (06-01 @ 05:39)  WBC: 7.11 (06-03 @ 08:14), 7.0 (06-01 @ 05:39)  Plt: 205 (06-03 @ 08:14), 236 (06-01 @ 05:39)      IMAGING:   Recent imaging studies were reviewed.    MEDICATIONS:  aspirin 325 milliGRAM(s) Oral daily  atorvastatin 80 milliGRAM(s) Oral at bedtime  clopidogrel Tablet 75 milliGRAM(s) Oral daily  docusate sodium 100 milliGRAM(s) Oral three times a day  enoxaparin Injectable 40 milliGRAM(s) SubCutaneous every 24 hours  ondansetron Injectable 4 milliGRAM(s) IV Push once PRN  oxyCODONE    IR 5 milliGRAM(s) Oral every 4 hours PRN  oxyCODONE    IR 10 milliGRAM(s) Oral every 4 hours PRN  senna 2 Tablet(s) Oral at bedtime  sodium chloride 0.9%. 1000 milliLiter(s) IV Continuous <Continuous>    EXAMINATION:  General:  calm  HEENT:  MMM  Neuro:    Cards:  RRR  Respiratory:  no respiratory distress  Abdomen:  soft  Extremities:  no edema  Skin:  warm/dry

## 2019-06-03 NOTE — PROGRESS NOTE ADULT - SUBJECTIVE AND OBJECTIVE BOX
Subjective: Patient seen and examined. No new events except as noted.     REVIEW OF SYSTEMS:    CONSTITUTIONAL: No weakness, fevers or chills  EYES/ENT: No visual changes;  No vertigo or throat pain   NECK: No pain or stiffness  RESPIRATORY: No cough, wheezing, hemoptysis; No shortness of breath  CARDIOVASCULAR: No chest pain or palpitations  GASTROINTESTINAL: No abdominal or epigastric pain. No nausea, vomiting, or hematemesis; No diarrhea or constipation. No melena or hematochezia.  GENITOURINARY: No dysuria, frequency or hematuria  NEUROLOGICAL: No numbness or weakness  SKIN: No itching, burning, rashes, or lesions   All other review of systems is negative unless indicated above.    MEDICATIONS:  MEDICATIONS  (STANDING):  ALPRAZolam 1 milliGRAM(s) Oral once  aspirin 325 milliGRAM(s) Oral daily  atorvastatin 80 milliGRAM(s) Oral at bedtime  clopidogrel Tablet 75 milliGRAM(s) Oral daily  enoxaparin Injectable 40 milliGRAM(s) SubCutaneous every 24 hours  sodium chloride 0.9%. 1000 milliLiter(s) (75 mL/Hr) IV Continuous <Continuous>      PHYSICAL EXAM:  T(C): 36.7 (06-03-19 @ 07:06), Max: 36.9 (06-02-19 @ 16:40)  HR: 62 (06-03-19 @ 07:06) (62 - 77)  BP: 155/74 (06-03-19 @ 07:06) (131/72 - 162/70)  RR: 18 (06-03-19 @ 07:06) (16 - 18)  SpO2: 96% (06-03-19 @ 07:06) (94% - 97%)  Wt(kg): --  I&O's Summary        Appearance: Normal	  HEENT:   Normal oral mucosa, PERRL, EOMI	  Lymphatic: No lymphadenopathy , no edema  Cardiovascular: Normal S1 S2, No JVD, No murmurs , Peripheral pulses palpable 2+ bilaterally  Respiratory: Lungs clear to auscultation, normal effort 	  Gastrointestinal:  Soft, Non-tender, + BS	  Skin: No rashes, No ecchymoses, No cyanosis, warm to touch  Musculoskeletal: Normal range of motion, normal strength  Psychiatry:  Mood & affect appropriate  Ext: No edema      LABS:    CARDIAC MARKERS:                                14.5   7.11  )-----------( 205      ( 03 Jun 2019 08:14 )             43.3     06-03    140  |  105  |  14  ----------------------------<  108<H>  4.1   |  24  |  1.10    Ca    9.6      03 Jun 2019 05:58      proBNP:   Lipid Profile:   HgA1c:   TSH:             TELEMETRY: 	SR    ECG:  	  RADIOLOGY:   DIAGNOSTIC TESTING:  [ ] Echocardiogram:  < from: Transthoracic Echocardiogram (06.02.19 @ 09:20) >    Patient name: LURDES OTT  YOB: 1945   Age: 74 (M)   MR#: [id number]  Study Date: 6/2/2019  Location: [address] [place], [place] [id number]: Layla Reid RDCS  Study quality: Technically difficult  Referring Physician: Richard B Libman, MD  Blood Pressure: 122/69 mmHg  Height: 173 cm  Weight: 96 kg  BSA: 2.1 m2  ------------------------------------------------------------------------  PROCEDURE: Transthoracic echocardiogram with 2-D, M-Mode  and complete spectral and color flow Doppler.  INDICATION: Encounter for preprocedural cardiovascular  examination (Z01.810)  ------------------------------------------------------------------------  Dimensions:    Normal Values:  LA:     3.4    2.0 - 4.0 cm  Ao:     3.1    2.0 - 3.8 cm  SEPTUM: 1.1    0.6 - 1.2 cm  PWT:    1.0    0.6 - 1.1 cm  LVIDd:  4.2    3.0 - 5.6 cm  LVIDs:  2.2    1.8 - 4.0 cm  Derived variables:  LVMI: 70 g/m2  RWT: 0.47  Fractional short: 48 %  EF (Visual Estimate): 65-70 %  Doppler Peak Velocity (m/sec): AoV=0.9  ------------------------------------------------------------------------  Observations:  Mitral Valve: Mitral annular calcification. Mitral valve  not well visualized. Minimal mitral regurgitation.  Aortic Valve/Aorta: Aortic valve not well visualized;  appears calcified.  Aortic Root: 3.1 cm.  Left Atrium: LA volume index = 14 cc/m2.  Left Ventricle: Endocardium not well visualized; grossly  normal left ventricular systolic function. Normal left  ventricular internal dimensions and wall thicknesses.  Right Heart: The right ventricle is not well visualized;  grossly normal right ventricular systolic function.  Pericardium/Pleura: Normal pericardium with no pericardial  effusion.  ------------------------------------------------------------------------  Conclusions:  1. Endocardium not well visualized; grossly normal left  ventricular systolic function.  2. The right ventricle is not well visualized; grossly  normal right ventricular systolic function.  ------------------------------------------------------------------------  Confirmed on  6/2/2019 - 13:57:14 by Madison Benjamin M.D.  ------------------------------------------------------------------------    < end of copied text >    [ ]  Catheterization:  [ ] Stress Test:    OTHER:

## 2019-06-04 ENCOUNTER — TRANSCRIPTION ENCOUNTER (OUTPATIENT)
Age: 74
End: 2019-06-04

## 2019-06-04 VITALS
HEART RATE: 59 BPM | RESPIRATION RATE: 18 BRPM | TEMPERATURE: 98 F | OXYGEN SATURATION: 97 % | DIASTOLIC BLOOD PRESSURE: 61 MMHG | SYSTOLIC BLOOD PRESSURE: 114 MMHG

## 2019-06-04 LAB
ANION GAP SERPL CALC-SCNC: 10 MMOL/L — SIGNIFICANT CHANGE UP (ref 5–17)
BLD GP AB SCN SERPL QL: NEGATIVE — SIGNIFICANT CHANGE UP
BUN SERPL-MCNC: 14 MG/DL — SIGNIFICANT CHANGE UP (ref 7–23)
CALCIUM SERPL-MCNC: 8.5 MG/DL — SIGNIFICANT CHANGE UP (ref 8.4–10.5)
CHLORIDE SERPL-SCNC: 104 MMOL/L — SIGNIFICANT CHANGE UP (ref 96–108)
CO2 SERPL-SCNC: 23 MMOL/L — SIGNIFICANT CHANGE UP (ref 22–31)
CREAT SERPL-MCNC: 0.99 MG/DL — SIGNIFICANT CHANGE UP (ref 0.5–1.3)
GLUCOSE SERPL-MCNC: 87 MG/DL — SIGNIFICANT CHANGE UP (ref 70–99)
HCT VFR BLD CALC: 38.2 % — LOW (ref 39–50)
HGB BLD-MCNC: 13 G/DL — SIGNIFICANT CHANGE UP (ref 13–17)
MAGNESIUM SERPL-MCNC: 2.1 MG/DL — SIGNIFICANT CHANGE UP (ref 1.6–2.6)
MCHC RBC-ENTMCNC: 31.5 PG — SIGNIFICANT CHANGE UP (ref 27–34)
MCHC RBC-ENTMCNC: 34.1 GM/DL — SIGNIFICANT CHANGE UP (ref 32–36)
MCV RBC AUTO: 92.3 FL — SIGNIFICANT CHANGE UP (ref 80–100)
PA ADP PRP-ACNC: 169 PRU — LOW (ref 194–417)
PHOSPHATE SERPL-MCNC: 2.9 MG/DL — SIGNIFICANT CHANGE UP (ref 2.5–4.5)
PLATELET # BLD AUTO: 207 K/UL — SIGNIFICANT CHANGE UP (ref 150–400)
POTASSIUM SERPL-MCNC: 3.7 MMOL/L — SIGNIFICANT CHANGE UP (ref 3.5–5.3)
POTASSIUM SERPL-SCNC: 3.7 MMOL/L — SIGNIFICANT CHANGE UP (ref 3.5–5.3)
RBC # BLD: 4.13 M/UL — LOW (ref 4.2–5.8)
RBC # FLD: 11.8 % — SIGNIFICANT CHANGE UP (ref 10.3–14.5)
RH IG SCN BLD-IMP: POSITIVE — SIGNIFICANT CHANGE UP
SODIUM SERPL-SCNC: 137 MMOL/L — SIGNIFICANT CHANGE UP (ref 135–145)
WBC # BLD: 7.1 K/UL — SIGNIFICANT CHANGE UP (ref 3.8–10.5)
WBC # FLD AUTO: 7.1 K/UL — SIGNIFICANT CHANGE UP (ref 3.8–10.5)

## 2019-06-04 PROCEDURE — 85027 COMPLETE CBC AUTOMATED: CPT

## 2019-06-04 PROCEDURE — 80053 COMPREHEN METABOLIC PANEL: CPT

## 2019-06-04 PROCEDURE — 97162 PT EVAL MOD COMPLEX 30 MIN: CPT

## 2019-06-04 PROCEDURE — 80061 LIPID PANEL: CPT

## 2019-06-04 PROCEDURE — 70496 CT ANGIOGRAPHY HEAD: CPT

## 2019-06-04 PROCEDURE — 82803 BLOOD GASES ANY COMBINATION: CPT

## 2019-06-04 PROCEDURE — C1876: CPT

## 2019-06-04 PROCEDURE — C1725: CPT

## 2019-06-04 PROCEDURE — C1769: CPT

## 2019-06-04 PROCEDURE — 99285 EMERGENCY DEPT VISIT HI MDM: CPT

## 2019-06-04 PROCEDURE — 82330 ASSAY OF CALCIUM: CPT

## 2019-06-04 PROCEDURE — 82435 ASSAY OF BLOOD CHLORIDE: CPT

## 2019-06-04 PROCEDURE — 85576 BLOOD PLATELET AGGREGATION: CPT

## 2019-06-04 PROCEDURE — 37215 TRANSCATH STENT CCA W/EPS: CPT

## 2019-06-04 PROCEDURE — C1887: CPT

## 2019-06-04 PROCEDURE — 99283 EMERGENCY DEPT VISIT LOW MDM: CPT

## 2019-06-04 PROCEDURE — C1884: CPT

## 2019-06-04 PROCEDURE — 93005 ELECTROCARDIOGRAM TRACING: CPT

## 2019-06-04 PROCEDURE — 85730 THROMBOPLASTIN TIME PARTIAL: CPT

## 2019-06-04 PROCEDURE — 80048 BASIC METABOLIC PNL TOTAL CA: CPT

## 2019-06-04 PROCEDURE — 36226 PLACE CATH VERTEBRAL ART: CPT

## 2019-06-04 PROCEDURE — 82947 ASSAY GLUCOSE BLOOD QUANT: CPT

## 2019-06-04 PROCEDURE — 83735 ASSAY OF MAGNESIUM: CPT

## 2019-06-04 PROCEDURE — 93306 TTE W/DOPPLER COMPLETE: CPT

## 2019-06-04 PROCEDURE — 84100 ASSAY OF PHOSPHORUS: CPT

## 2019-06-04 PROCEDURE — 84295 ASSAY OF SERUM SODIUM: CPT

## 2019-06-04 PROCEDURE — C1894: CPT

## 2019-06-04 PROCEDURE — 86900 BLOOD TYPING SEROLOGIC ABO: CPT

## 2019-06-04 PROCEDURE — 86850 RBC ANTIBODY SCREEN: CPT

## 2019-06-04 PROCEDURE — C1760: CPT

## 2019-06-04 PROCEDURE — 99233 SBSQ HOSP IP/OBS HIGH 50: CPT

## 2019-06-04 PROCEDURE — 83605 ASSAY OF LACTIC ACID: CPT

## 2019-06-04 PROCEDURE — 84132 ASSAY OF SERUM POTASSIUM: CPT

## 2019-06-04 PROCEDURE — 93880 EXTRACRANIAL BILAT STUDY: CPT | Mod: 26

## 2019-06-04 PROCEDURE — 92523 SPEECH SOUND LANG COMPREHEN: CPT

## 2019-06-04 PROCEDURE — 93880 EXTRACRANIAL BILAT STUDY: CPT

## 2019-06-04 PROCEDURE — 36224 PLACE CATH CAROTD ART: CPT | Mod: XS

## 2019-06-04 PROCEDURE — 85014 HEMATOCRIT: CPT

## 2019-06-04 PROCEDURE — 97165 OT EVAL LOW COMPLEX 30 MIN: CPT

## 2019-06-04 PROCEDURE — 83036 HEMOGLOBIN GLYCOSYLATED A1C: CPT

## 2019-06-04 PROCEDURE — 70498 CT ANGIOGRAPHY NECK: CPT

## 2019-06-04 PROCEDURE — 85610 PROTHROMBIN TIME: CPT

## 2019-06-04 PROCEDURE — 86901 BLOOD TYPING SEROLOGIC RH(D): CPT

## 2019-06-04 RX ORDER — ATORVASTATIN CALCIUM 80 MG/1
1 TABLET, FILM COATED ORAL
Qty: 30 | Refills: 0
Start: 2019-06-04 | End: 2019-07-03

## 2019-06-04 RX ORDER — ATORVASTATIN CALCIUM 80 MG/1
1 TABLET, FILM COATED ORAL
Qty: 0 | Refills: 0 | DISCHARGE

## 2019-06-04 RX ORDER — ASPIRIN/CALCIUM CARB/MAGNESIUM 324 MG
1 TABLET ORAL
Qty: 0 | Refills: 0 | DISCHARGE

## 2019-06-04 RX ORDER — POTASSIUM CHLORIDE 20 MEQ
40 PACKET (EA) ORAL ONCE
Refills: 0 | Status: COMPLETED | OUTPATIENT
Start: 2019-06-04 | End: 2019-06-04

## 2019-06-04 RX ORDER — CLOPIDOGREL BISULFATE 75 MG/1
1 TABLET, FILM COATED ORAL
Qty: 30 | Refills: 0
Start: 2019-06-04 | End: 2019-07-03

## 2019-06-04 RX ORDER — ASPIRIN/CALCIUM CARB/MAGNESIUM 324 MG
1 TABLET ORAL
Qty: 30 | Refills: 0
Start: 2019-06-04 | End: 2019-07-03

## 2019-06-04 RX ORDER — SODIUM CHLORIDE 9 MG/ML
500 INJECTION INTRAMUSCULAR; INTRAVENOUS; SUBCUTANEOUS ONCE
Refills: 0 | Status: COMPLETED | OUTPATIENT
Start: 2019-06-04 | End: 2019-06-04

## 2019-06-04 RX ADMIN — Medication 325 MILLIGRAM(S): at 11:15

## 2019-06-04 RX ADMIN — CLOPIDOGREL BISULFATE 75 MILLIGRAM(S): 75 TABLET, FILM COATED ORAL at 11:14

## 2019-06-04 RX ADMIN — Medication 100 MILLIGRAM(S): at 05:19

## 2019-06-04 RX ADMIN — ENOXAPARIN SODIUM 40 MILLIGRAM(S): 100 INJECTION SUBCUTANEOUS at 11:15

## 2019-06-04 RX ADMIN — Medication 40 MILLIEQUIVALENT(S): at 05:00

## 2019-06-04 RX ADMIN — SODIUM CHLORIDE 75 MILLILITER(S): 9 INJECTION INTRAMUSCULAR; INTRAVENOUS; SUBCUTANEOUS at 00:05

## 2019-06-04 RX ADMIN — SODIUM CHLORIDE 75 MILLILITER(S): 9 INJECTION INTRAMUSCULAR; INTRAVENOUS; SUBCUTANEOUS at 00:01

## 2019-06-04 RX ADMIN — SODIUM CHLORIDE 1000 MILLILITER(S): 9 INJECTION INTRAMUSCULAR; INTRAVENOUS; SUBCUTANEOUS at 00:15

## 2019-06-04 NOTE — DISCHARGE NOTE PROVIDER - NSDCCPCAREPLAN_GEN_ALL_CORE_FT
PRINCIPAL DISCHARGE DIAGNOSIS  Diagnosis: Acute ischemic stroke  Assessment and Plan of Treatment: Please follow up with neurologist in 1 week. Continue taking medications as prescribed. Monitor your blood pressure. Reduce fat, cholesterol and salt in your diet. Increase intake of fruits and vegetables. Limit alcohol to minimum and do not smoke. You may be at risk for falling, make changes to your home to help you walk easier. Keep up to date on vaccinations.  If you experience any symptoms of facial drooping, slurred speech, arm or leg weakness, severe headache, vision changes or any worsening symptoms, notify provider immediatley and return to ER.        SECONDARY DISCHARGE DIAGNOSES  Diagnosis: Carotid stenosis  Assessment and Plan of Treatment: carotid stent placed on 06/03

## 2019-06-04 NOTE — PROGRESS NOTE ADULT - ASSESSMENT
74-year-old right-handed white gentleman first evaluated at [facility] on 5/31/19 with aphasia.  He had known bilateral asymptomatic carotid stenosis which had been followed conservatively. He had stopped taking aspirin about 3 weeks prior to admission due to hematuria. On 5/26/19, he developed probable motor aphasia and possibly apraxia, which subsequently improved but probably not quite back to baseline. His symptoms were due to acute left MCA infarction, most likely secondary to symptomatic severe left carotid stenosis.     NEURO: Neurologically without acute change, continue on high dose statin, Carotid doppler showed 70% stenosis of the proximal left internal carotid artery. There is 50-69% stenosis of the proximal right internal carotid artery. S/P revascularization of his left carotid artery with left carotid wall stenting.  Carotid duplex pending for evaluation of new baseline velocity. Physical therapy/OT recommended home with no needs.     ANTITHROMBOTIC THERAPY: ASA/Plavix ; p21y12 130. Repeat Pending.     PULMONARY: protecting airway, saturating well     CARDIOVASCULAR: TTE 65-50%. mitral annular calcification, minimal MR, Continue cardiac monitoring. Dr. Emely richards. Enrolled in [employer], randomized to no ILR.                               SBP goal: 110-150mmhg    GASTROINTESTINAL:  dysphagia screen passed, tolerating diet      Diet: Regular, NPO after midnight for procedure.     RENAL: BUN/Cr within goal range, good urine output , maintain adequate hydration      Na Goal: Greater than 135     Grace: No    HEMATOLOGY: no signs or symptoms of bleeding noted.  Noted with hx of hemochromatosis- heme follow up as outpatient with all routine outpatient malignancy screenings.     DVT ppx: LMWH    ID: afebrile, no sign/sx of infection    OTHER: Plan endorsed to patient and family at bedside, all questions and concerns addressed. Additionally instructed regarding limitations in mobility and activity at home as post angiogram/carotid stenting (refraining from heavy lifting, straining golf etc. until cleared at follow up in 1-2 weeks)    DISPOSITION: Home with no needs.     CORE MEASURES:        Admission NIHSS: 0     TPA: NO      LDL/HDL: 76/42     Depression Screen: 0     Statin Therapy: yes     Dysphagia Screen: PASS     Smoking NO      Afib NO     Stroke Education: pending    Obtain screening lower extremity venous ultrasound in patients who meet 1 or more of the following criteria as patient is high risk for DVT/PE on admission:   [] History of DVT/PE  []Hypercoagulable states (Factor V Leiden, Cancer, OCP, etc. )  []Prolonged immobility (hemiplegia/hemiparesis/post operative or any other extended immobilization)  [] Transferred from outside facility (Rehab or Long term care)  [] Age </= to 50 74-year-old right-handed white gentleman first evaluated at [facility] on 5/31/19 with aphasia.  He had known bilateral asymptomatic carotid stenosis which had been followed conservatively. He had stopped taking aspirin about 3 weeks prior to admission due to hematuria. On 5/26/19, he developed probable motor aphasia and possibly apraxia, which subsequently improved but probably not quite back to baseline. His symptoms were due to acute left MCA infarction, most likely secondary to symptomatic severe left carotid stenosis.     NEURO: Neurologically without acute change, continue on high dose statin, Carotid doppler showed 70% stenosis of the proximal left internal carotid artery. There is 50-69% stenosis of the proximal right internal carotid artery. S/P revascularization of his left carotid artery with left carotid wall stenting.  Carotid duplex pending for evaluation of new baseline velocity. Physical therapy/OT recommended home with no needs.     ANTITHROMBOTIC THERAPY: ASA/Plavix ; p21y12 130. Repeat Pending.     PULMONARY: protecting airway, saturating well     CARDIOVASCULAR: TTE 65-50%. mitral annular calcification, minimal MR, Continue cardiac monitoring. Dr. Emely richards. Enrolled in [employer], randomized to no ILR.                               SBP goal: 110-150mmhg    GASTROINTESTINAL:  dysphagia screen passed, tolerating diet      Diet: Regular, NPO after midnight for procedure.     RENAL: BUN/Cr within goal range, good urine output , maintain adequate hydration      Na Goal: Greater than 135     Grace: No    HEMATOLOGY: no signs or symptoms of bleeding noted.  Noted with hx of hemochromatosis- heme follow up as outpatient with all routine outpatient malignancy screenings.     DVT ppx: LMWH    ID: afebrile, no sign/sx of infection    OTHER: Plan endorsed to patient and family at bedside, all questions and concerns addressed. Additionally instructed regarding limitations in mobility and activity at home as post angiogram/carotid stenting (refraining from heavy lifting, straining golf etc. until cleared at follow up in 1-2 weeks)    DISPOSITION: Home with no needs.     CORE MEASURES:        Admission NIHSS: 0     TPA: NO      LDL/HDL: 76/42     Depression Screen: 0     Statin Therapy: yes     Dysphagia Screen: PASS     Smoking Yes- quit 20 years ago      Afib NO     Stroke Education: pending    Obtain screening lower extremity venous ultrasound in patients who meet 1 or more of the following criteria as patient is high risk for DVT/PE on admission:   [] History of DVT/PE  []Hypercoagulable states (Factor V Leiden, Cancer, OCP, etc. )  []Prolonged immobility (hemiplegia/hemiparesis/post operative or any other extended immobilization)  [] Transferred from outside facility (Rehab or Long term care)  [] Age </= to 50

## 2019-06-04 NOTE — DISCHARGE NOTE NURSING/CASE MANAGEMENT/SOCIAL WORK - NSDCDPATPORTLINK_GEN_ALL_CORE
You can access the [facility] Portal, offered by [facility], by registering with the following website: [email or web address]

## 2019-06-04 NOTE — DISCHARGE NOTE PROVIDER - PROVIDER TOKENS
PROVIDER:[TOKEN:[[id number]],FOLLOWUP:[1 week]],PROVIDER:[TOKEN:[[id number]],FOLLOWUP:[2 weeks]],PROVIDER:[TOKEN:[[id number]],FOLLOWUP:[2 weeks]]

## 2019-06-04 NOTE — DISCHARGE NOTE NURSING/CASE MANAGEMENT/SOCIAL WORK - NSDCPEPTSTRK_GEN_ALL_CORE
Signs and symptoms of stroke/Risk factors for stroke/Stroke education booklet/Stroke support groups for patients, families, and friends/Stroke warning signs and symptoms/Need for follow up after discharge/Prescribed medications/Call 911 for stroke

## 2019-06-04 NOTE — PROGRESS NOTE ADULT - SUBJECTIVE AND OBJECTIVE BOX
THE PATIENT WAS SEEN AND EXAMINED BY ME WITH THE HOUSESTAFF AND STROKE TEAM DURING MORNING ROUNDS.   HPI:    74-year-old right-handed white gentleman first evaluated at [facility] on 5/31/19 with aphasia. He has been followed conservatively for several years with asymptomatic carotid stenosis. He had been on aspirin, but aspirin was stopped 3 weeks prior to admission due to hematuria. On 5/26/19, he developed virtual inability to generate speech, which was severely "garbled" for about 10 minutes. At the same time, he had difficulty putting on his pants but without obvious motor deficits. His severe speech difficulties lasted about 10 minutes, but he was left with residual mild word finding difficulty. He was seen by Dr. Deepak Leone (neurology) who obtained an MRI and sent him to the ED. He has a history of hemochromatosis. He quit smoking about 15 years prior to admission. He is a retired [place] .     SUBJECTIVE: No events overnight.  No new neurologic complaints.      aspirin 325 milliGRAM(s) Oral daily  atorvastatin 80 milliGRAM(s) Oral at bedtime  clopidogrel Tablet 75 milliGRAM(s) Oral daily  docusate sodium 100 milliGRAM(s) Oral three times a day  enoxaparin Injectable 40 milliGRAM(s) SubCutaneous every 24 hours  oxyCODONE    IR 5 milliGRAM(s) Oral every 4 hours PRN  oxyCODONE    IR 10 milliGRAM(s) Oral every 4 hours PRN  senna 2 Tablet(s) Oral at bedtime  sodium chloride 0.9%. 1000 milliLiter(s) IV Continuous <Continuous>      PHYSICAL EXAM:   Vital Signs Last 24 Hrs  T(C): 36.3 (04 Jun 2019 04:00), Max: 36.7 (03 Jun 2019 16:54)  T(F): 97.3 (04 Jun 2019 04:00), Max: 98 (03 Jun 2019 16:54)  HR: 68 (04 Jun 2019 07:00) (62 - 88)  BP: 112/58 (04 Jun 2019 07:00) (88/50 - 155/77)  BP(mean): 80 (04 Jun 2019 07:00) (63 - 80)  RR: 16 (04 Jun 2019 07:00) (15 - 18)  SpO2: 95% (04 Jun 2019 07:00) (90% - 100%)    General: No acute distress  HEENT: EOM intact, visual fields full  Abdomen: Soft, nontender, nondistended   Extremities: No edema, right groin site with no active bleeding, soft, nttp, no hematoma , + pedal pulse    NEUROLOGICAL EXAM:  Mental status: Alert and attentive; speech fluent, with no word finding difficulty or paraphasic errors; followed crossed commands; repetition intact   Cranial Nerves: No facial asymmetry, no nystagmus, no dysarthria, tongue midline  Motor exam: extremities move well against gravity   Sensation: Intact to light touch   Coordination/ Gait: No dysmetria, gait not tested    LABS:                        13.0   7.1   )-----------( 207      ( 04 Jun 2019 04:02 )             38.2    06-04    137  |  104  |  14  ----------------------------<  87  3.7   |  23  |  0.99    Ca    8.5      04 Jun 2019 04:02  Phos  2.9     06-04  Mg     2.1     06-04      Hemoglobin A1C, Whole Blood: 5.8 % (05-31 @ 10:15)  Hemoglobin A1C, Whole Blood: 5.8 % (05-31 @ 10:15)      IMAGING: Reviewed by me.     (05.31.19)  CT BRAIN: Subacute left middle cerebral artery infarct involving the left   anterior corona radiata, frontal operculum and anterior insula. No   hemorrhagic conversion.    CTA NECK:   Critical stenosis/occlusion of the left internal carotid artery just   distal to the bifurcation with distally diminished caliber but preserved   patency. Correlation with carotid duplex sonography or conventional   angiography would provide improved characterization of hemodynamics.  Moderate stenosis of the right internal carotid artery just distal to   the bifurcation.     CTA HEAD: No significant stenosis or occlusion of the major proximal   branches of the [place].

## 2019-06-04 NOTE — PROGRESS NOTE ADULT - ASSESSMENT
Summary: 74 year old M s/p L carotid wall stenting    NEURO:  q1h neuro checks  Stroke core measures: A1c, LDL  on , and Plavix 75 daily, per NSGY  Carotid dopplers tomorrow, per Neuro IR  OOB as tolerated; possible DC after carotid dopplers tomorrow    CARDS:  -150  HLD - Contd on Atorvastatin 80 qHS    PULM:  sat > 92%  Incentive spirometry    RENAL:  IVF until good po intake    GASTRO:  advance as tolerated  Bowel regimen  ---> Stress ulcer prophylaxis:  Not indicated    HEME:    ---> DVT prophylaxis: SCDs, hold anticoagulation, Lovenox 40 sc qHS    ENDO:  euglycemia    ID:  afebrile    Code status:  Full code  Disposition:  ICU Summary: 74 year old M POD #1 s/p L carotid wall stenting    NEURO:  q4h neuro checks  Stroke core measures: A1c, LDL  on , and Plavix 75 daily, per NSGY  Check P2Y12  Carotid dopplers today,   PT eval   OOB as tolerated;  possible DC after carotid dopplers tomorrow    CARDS: Hx  HTN  Bp stable off meds  -150  HLD - Contd on Atorvastatin 80 qHS    PULM:  sat > 92%  Incentive spirometry    RENAL: IVL  No french    GASTRO:  advance as tolerated  Bowel regimen  ---> Stress ulcer prophylaxis:  Not indicated    HEME:    ---> DVT prophylaxis: SCDs,Lovenox 40 sc qHS    ENDO: HLD    atorvastatin   euglycemia    ID:  afebrile    Code status:  Full code  Disposition:  ICU

## 2019-06-04 NOTE — DISCHARGE NOTE PROVIDER - CARE PROVIDERS DIRECT ADDRESSES
,DirectAddress_Unknown,[email or web address],DirectAddress_Unknown

## 2019-06-04 NOTE — DISCHARGE NOTE PROVIDER - CARE PROVIDER_API CALL
Dash Seth (DO)  Neurology; Vascular Neurology  [address]  [place] [address]  Phone: [phone]  Fax: [phone]  Follow Up Time: 1 week    Sourav Esquivel)  Neurological Surgery  [address] [place] [address]  Phone: [phone]  Fax: [phone]  Follow Up Time: 2 weeks    Armando Han (DO)  Cardiology; Internal Medicine  [address] [facility] [address], [address]  [place] [address]  Phone: [phone]  Fax: [phone]  Follow Up Time: 2 weeks

## 2019-06-04 NOTE — DISCHARGE NOTE PROVIDER - HOSPITAL COURSE
74-year-old right-handed white gentleman first evaluated at [facility] on 5/31/19 with aphasia. He has been followed conservatively for several years with asymptomatic carotid stenosis. He had been on aspirin, but aspirin was stopped 3 weeks prior to admission due to hematuria. On 5/26/19, he developed virtual inability to generate speech, which was severely "garbled" for about 10 minutes. At the same time, he had difficulty putting on his pants but without obvious motor deficits. His severe speech difficulties lasted about 10 minutes, but he was left with residual mild word finding difficulty. He was seen by Dr. Deepak Leone (neurology) who obtained an MRI and sent him to the ED for acute L MCA infarct. He has a history of hemochromatosis. He quit smoking about 15 years prior to admission. He is a retired [place] .     (05.31.19)    CT BRAIN: Subacute left middle cerebral artery infarct involving the left     anterior corona radiata, frontal operculum and anterior insula. No     hemorrhagic conversion.        CTA NECK:     Critical stenosis/occlusion of the left internal carotid artery just     distal to the bifurcation with distally diminished caliber but preserved     patency. Correlation with carotid duplex sonography or conventional     angiography would provide improved characterization of hemodynamics.    Moderate stenosis of the right internal carotid artery just distal to     the bifurcation.         CTA HEAD: No significant stenosis or occlusion of the major proximal     branches of the [place].            Patient had left carotid stent placed for severe left carotid stenosis on 06/03 with Dr. Esquivel. Patient discharged on  mg and Plavix 75 mg daily.     Patient enrolled in stroke AF trial, control group- NO LOOP. 74-year-old right-handed white gentleman first evaluated at [facility] on 5/31/19 with aphasia. He has been followed conservatively for several years with asymptomatic carotid stenosis. He had been on aspirin, but aspirin was stopped 3 weeks prior to admission due to hematuria. On 5/26/19, he developed virtual inability to generate speech, which was severely "garbled" for about 10 minutes. At the same time, he had difficulty putting on his pants but without obvious motor deficits. His severe speech difficulties lasted about 10 minutes, but he was left with residual mild word finding difficulty. He was seen by Dr. Deepak Leone (neurology) who obtained an MRI and sent him to the ED for acute L MCA infarct. He has a history of hemochromatosis. He quit smoking about 15 years prior to admission. He is a retired [place] .     (05.31.19)    CT BRAIN: Subacute left middle cerebral artery infarct involving the left     anterior corona radiata, frontal operculum and anterior insula. No     hemorrhagic conversion.        CTA NECK:     Critical stenosis/occlusion of the left internal carotid artery just     distal to the bifurcation with distally diminished caliber but preserved     patency. Correlation with carotid duplex sonography or conventional     angiography would provide improved characterization of hemodynamics.    Moderate stenosis of the right internal carotid artery just distal to     the bifurcation.         CTA HEAD: No significant stenosis or occlusion of the major proximal     branches of the [place].            Patient had left carotid stent placed for severe left carotid stenosis on 06/03 with Dr. Esquivel. Patient discharged on  mg and Plavix 75 mg daily.     Patient enrolled in stroke AF trial, control group- NO LOOP.    Patient evaluated by PT/OT and was recommended home with no services. Patient stable for discharge. 74-year-old right-handed white gentleman first evaluated at [facility] on 5/31/19 with aphasia. He has been followed conservatively for several years with asymptomatic carotid stenosis. He had been on aspirin, but aspirin was stopped 3 weeks prior to admission due to hematuria. On 5/26/19, he developed virtual inability to generate speech, which was severely "garbled" for about 10 minutes. At the same time, he had difficulty putting on his pants but without obvious motor deficits. His severe speech difficulties lasted about 10 minutes, but he was left with residual mild word finding difficulty. He was seen by Dr. Deepak Leone (neurology) who obtained an MRI and sent him to the ED for acute L MCA infarct. He has a history of hemochromatosis. He quit smoking about 15 years prior to admission. He is a retired [place] .     (05.31.19)    CT BRAIN: Subacute left middle cerebral artery infarct involving the left     anterior corona radiata, frontal operculum and anterior insula. No     hemorrhagic conversion.        CTA NECK:     Critical stenosis/occlusion of the left internal carotid artery just     distal to the bifurcation with distally diminished caliber but preserved     patency. Correlation with carotid duplex sonography or conventional     angiography would provide improved characterization of hemodynamics.    Moderate stenosis of the right internal carotid artery just distal to     the bifurcation.         CTA HEAD: No significant stenosis or occlusion of the major proximal     branches of the [place].            Patient had left carotid stent placed for severe left carotid stenosis on 06/03 with Dr. Esquivel. Patient discharged on  mg and Plavix 75 mg daily. P2Y12 on 06/. Carotid doppler on done on 06/04.    Patient enrolled in stroke AF trial, control group- NO LOOP.    Patient evaluated by PT/OT and was recommended home with no services. Patient stable for discharge. 74-year-old right-handed white gentleman first evaluated at [facility] on 5/31/19 with aphasia. He has been followed conservatively for several years with asymptomatic carotid stenosis. He had been on aspirin, but aspirin was stopped 3 weeks prior to admission due to hematuria. On 5/26/19, he developed virtual inability to generate speech, which was severely "garbled" for about 10 minutes. At the same time, he had difficulty putting on his pants but without obvious motor deficits. His severe speech difficulties lasted about 10 minutes, but he was left with residual mild word finding difficulty. He was seen by Dr. Deepak Leone (neurology) who obtained an MRI and sent him to the ED for acute L MCA infarct. He has a history of hemochromatosis. He quit smoking about 15 years prior to admission. He is a retired [place] .     (05.31.19)    CT BRAIN: Subacute left middle cerebral artery infarct involving the left     anterior corona radiata, frontal operculum and anterior insula. No     hemorrhagic conversion.        CTA NECK:     Critical stenosis/occlusion of the left internal carotid artery just     distal to the bifurcation with distally diminished caliber but preserved     patency. Correlation with carotid duplex sonography or conventional     angiography would provide improved characterization of hemodynamics.    Moderate stenosis of the right internal carotid artery just distal to     the bifurcation.         CTA HEAD: No significant stenosis or occlusion of the major proximal     branches of the [place].            Patient had left carotid stent placed for severe left carotid stenosis on 06/03 with Dr. Esquivel. Patient discharged on  mg and Plavix 75 mg daily. P2Y12 on 06/. Carotid doppler done on 06/04.    Patient enrolled in stroke AF trial, control group- NO LOOP. Patient will continue on his home dose statin, Lipitor 80mg.    Patient evaluated by PT/OT and was recommended home with no services. Patient stable for discharge.

## 2019-06-04 NOTE — PROGRESS NOTE ADULT - SUBJECTIVE AND OBJECTIVE BOX
Subjective: Patient seen and examined. No new events except as noted.   s/p carotid stent   Feels well     REVIEW OF SYSTEMS:    CONSTITUTIONAL: No weakness, fevers or chills  EYES/ENT: No visual changes;  No vertigo or throat pain   NECK: No pain or stiffness  RESPIRATORY: No cough, wheezing, hemoptysis; No shortness of breath  CARDIOVASCULAR: No chest pain or palpitations  GASTROINTESTINAL: No abdominal or epigastric pain. No nausea, vomiting, or hematemesis; No diarrhea or constipation. No melena or hematochezia.  GENITOURINARY: No dysuria, frequency or hematuria  NEUROLOGICAL: No numbness or weakness  SKIN: No itching, burning, rashes, or lesions   All other review of systems is negative unless indicated above.    MEDICATIONS:  MEDICATIONS  (STANDING):  aspirin 325 milliGRAM(s) Oral daily  atorvastatin 80 milliGRAM(s) Oral at bedtime  clopidogrel Tablet 75 milliGRAM(s) Oral daily  docusate sodium 100 milliGRAM(s) Oral three times a day  enoxaparin Injectable 40 milliGRAM(s) SubCutaneous every 24 hours  senna 2 Tablet(s) Oral at bedtime  sodium chloride 0.9%. 1000 milliLiter(s) (75 mL/Hr) IV Continuous <Continuous>      PHYSICAL EXAM:  T(C): 36.7 (06-04-19 @ 11:42), Max: 36.9 (06-04-19 @ 11:28)  HR: 59 (06-04-19 @ 11:42) (59 - 88)  BP: 114/61 (06-04-19 @ 11:42) (88/50 - 155/77)  RR: 18 (06-04-19 @ 11:42) (15 - 24)  SpO2: 97% (06-04-19 @ 11:42) (90% - 100%)  Wt(kg): --  I&O's Summary    03 Jun 2019 07:01  -  04 Jun 2019 07:00  --------------------------------------------------------  IN: 1350 mL / OUT: 300 mL / NET: 1050 mL      Height (cm): 172.72 (06-03 @ 21:29)  Weight (kg): 96.2 (06-03 @ 21:29)  BMI (kg/m2): 32.2 (06-03 @ 21:29)  BSA (m2): 2.1 (06-03 @ 21:29)    Appearance: Nad	  HEENT:   Normal oral mucosa, PERRL, EOMI	  Lymphatic: No lymphadenopathy , no edema  Cardiovascular: Normal S1 S2, No JVD, No murmurs , Peripheral pulses palpable 2+ bilaterally  Respiratory: Lungs clear to auscultation, normal effort 	  Gastrointestinal:  Soft, Non-tender, + BS	  Skin: No rashes, No ecchymoses, No cyanosis, warm to touch  Musculoskeletal: Normal range of motion, normal strength  Psychiatry:  Mood & affect appropriate  Ext: No edema      LABS:    CARDIAC MARKERS:                                13.0   7.1   )-----------( 207      ( 04 Jun 2019 04:02 )             38.2     06-04    137  |  104  |  14  ----------------------------<  87  3.7   |  23  |  0.99    Ca    8.5      04 Jun 2019 04:02  Phos  2.9     06-04  Mg     2.1     06-04      proBNP:   Lipid Profile:   HgA1c:   TSH:             TELEMETRY: 	    ECG:  	  RADIOLOGY:   DIAGNOSTIC TESTING:  [ ] Echocardiogram:  [ ]  Catheterization:  [ ] Stress Test:    OTHER:

## 2019-06-04 NOTE — PROGRESS NOTE ADULT - SUBJECTIVE AND OBJECTIVE BOX
SUMMARY: 73 y/o RH  male with past medical history of Hemachromatosis w/ bi-annual phlebotomies (next due in 2 weeks from admission), HTN, HLD, Bladder Cancer, Bilateral Carotid stenosis on Full dose Aspirin presents to the ED sent in by private Neurologist (Dr. Deepak Sanford) for acute L MCA distribution infarcts and critical L ICA stenosis    PAST MEDICAL & SURGICAL HISTORY:  Hemochromatosis  Malignant neoplasm of bladder wall  Ex-heavy cigarette smoker (20-39 per day)  Osteoarthritis  Hyperlipemia  Hypertension  cardiac cath > 7 yrs. ago-neg-  at [facility]  H/O arthroscopy of knee- 2009  History of cystoscopy w/biopsy X 2 in 2004    On admission, the patient was:  GCS: 15  NIHSS:    REVIEW OF SYSTEMS:   [ X] All ROS addressed below are non-contributory,  Neuro: [ ] Headache [ ] Back pain [ ] Numbness [ ] Weakness [ ] Ataxia [ ] Dizziness [ ] Aphasia [ ] Dysarthria [ ] Visual disturbance  Resp: [ ] Shortness of breath/dyspnea, [ ] Orthopnea [ ] Cough  CV: [ ] Chest pain [ ] Palpitation [ ] Lightheadedness [ ] Syncope  Renal: [ ] Thirst [ ] Edema  GI: [ ] Nausea [ ] Emesis [ ] Abdominal pain [ ] Constipation [ ] Diarrhea  Hem: [ ] Hematemesis [ ] bright red blood per rectum  ID: [ ] Fever [ ] Chills [ ] Dysuria  ENT: [ ] Rhinorrhea    MEDICATIONS  (STANDING):  aspirin 325 milliGRAM(s) Oral daily  atorvastatin 80 milliGRAM(s) Oral at bedtime  clopidogrel Tablet 75 milliGRAM(s) Oral daily  docusate sodium 100 milliGRAM(s) Oral three times a day  enoxaparin Injectable 40 milliGRAM(s) SubCutaneous every 24 hours  senna 2 Tablet(s) Oral at bedtime  sodium chloride 0.9%. 1000 milliLiter(s) (75 mL/Hr) IV Continuous <Continuous>    MEDICATIONS  (PRN):  oxyCODONE    IR 5 milliGRAM(s) Oral every 4 hours PRN Moderate Pain (4 - 6)  oxyCODONE    IR 10 milliGRAM(s) Oral every 4 hours PRN Severe Pain (7 - 10)                            13.0   7.1   )-----------( 207      ( 04 Jun 2019 04:02 )             38.2   Hgb: 14.5 (06-03 @ 08:14), 14.5 (06-01 @ 05:39)  Hct: 43.3 (06-03 @ 08:14), 43.9 (06-01 @ 05:39)  WBC: 7.11 (06-03 @ 08:14), 7.0 (06-01 @ 05:39)  Plt: 205 (06-03 @ 08:14), 236 (06-01 @ 05:39)      06-04    137  |  104  |  14  ----------------------------<  87  3.7   |  23  |  0.99    Ca    8.5      04 Jun 2019 04:02  Phos  2.9     06-04  Mg     2.1     06-04      LABS:  Na: 140 (06-03 @ 05:58), 138 (06-01 @ 05:39)  K: 4.1 (06-03 @ 05:58), 4.0 (06-01 @ 05:39)  Cl: 105 (06-03 @ 05:58), 102 (06-01 @ 05:39)  CO2: 24 (06-03 @ 05:58), 26 (06-01 @ 05:39)  BUN: 14 (06-03 @ 05:58), 19 (06-01 @ 05:39)  Cr: 1.10 (06-03 @ 05:58), 1.02 (06-01 @ 05:39)  Glu: 108(06-03 @ 05:58), 107(06-01 @ 05:39)       PRE- OP VA Duplex Carotid, Bilat (05.31.19 @ 12:50)   Bilateral calcified plaque, left greater than right.    Findings consistent with greater than 70% stenosis of the proximal left   internal carotid artery.    There is 50-69% stenosis of the proximal right internal carotid artery.    There are stenoses of both external carotid arteries.    These findings are not significantly changed compared to the prior study.          EXAMINATION:  General:  calm  HEENT: Pupils 2mm and reactive , face symmetric, nl gaze horizontal and vertical  Neuro: Motor- 5/5 Ue and LE prox=distal  Sensation intact   Cards:  Nl RR No s3 s4  Respiratory:  no respiratory distress  Abdomen:  soft, and non tender   Extremities:  no edema, DP/PT intact B/L   Skin:  warm/dry SUMMARY: 75 y/o RH  male with past medical history of Hemachromatosis w/ bi-annual phlebotomies (next due in 2 weeks from admission), HTN, HLD, Bladder Cancer, Bilateral Carotid stenosis on Full dose Aspirin presents to the ED sent in by private Neurologist (Dr. Deepak Sanford) for acute L MCA distribution infarcts and critical L ICA stenosis    PAST MEDICAL & SURGICAL HISTORY:  Hemochromatosis  Malignant neoplasm of bladder wall  Ex-heavy cigarette smoker (20-39 per day)  Osteoarthritis  Hyperlipemia  Hypertension  cardiac cath > 7 yrs. ago-neg-  at [facility]  H/O arthroscopy of knee- 2009  History of cystoscopy w/biopsy X 2 in 2004    On admission, the patient was:  GCS: 15  NIHSS:    REVIEW OF SYSTEMS:   [ X] All ROS addressed below are non-contributory,  Neuro: [ ] Headache [ ] Back pain [ ] Numbness [ ] Weakness [ ] Ataxia [ ] Dizziness [ ] Aphasia [ ] Dysarthria [ ] Visual disturbance  Resp: [ ] Shortness of breath/dyspnea, [ ] Orthopnea [ ] Cough  CV: [ ] Chest pain [ ] Palpitation [ ] Lightheadedness [ ] Syncope  Renal: [ ] Thirst [ ] Edema  GI: [ ] Nausea [ ] Emesis [ ] Abdominal pain [ ] Constipation [ ] Diarrhea  Hem: [ ] Hematemesis [ ] bright red blood per rectum  ID: [ ] Fever [ ] Chills [ ] Dysuria  ENT: [ ] Rhinorrhea    MEDICATIONS  (STANDING):  aspirin 325 milliGRAM(s) Oral daily  atorvastatin 80 milliGRAM(s) Oral at bedtime  clopidogrel Tablet 75 milliGRAM(s) Oral daily  docusate sodium 100 milliGRAM(s) Oral three times a day  enoxaparin Injectable 40 milliGRAM(s) SubCutaneous every 24 hours  senna 2 Tablet(s) Oral at bedtime  sodium chloride 0.9%. 1000 milliLiter(s) (75 mL/Hr) IV Continuous <Continuous>    MEDICATIONS  (PRN):  oxyCODONE    IR 5 milliGRAM(s) Oral every 4 hours PRN Moderate Pain (4 - 6)  oxyCODONE    IR 10 milliGRAM(s) Oral every 4 hours PRN Severe Pain (7 - 10)    ICU Vital Signs Last 24 Hrs  T(C): 36.3 (04 Jun 2019 04:00), Max: 36.7 (03 Jun 2019 16:54)  HR: 68 (04 Jun 2019 07:00) (62 - 88)  BP: 112/58 (04 Jun 2019 07:00) (88/50 - 155/77)  RR: 16 (04 Jun 2019 07:00) (15 - 18)  SpO2: 95% (04 Jun 2019 07:00) (90% - 100%)                          13.0   7.1   )-----------( 207      ( 04 Jun 2019 04:02 )             38.2   Hgb: 14.5 (06-03 @ 08:14), 14.5 (06-01 @ 05:39)  Hct: 43.3 (06-03 @ 08:14), 43.9 (06-01 @ 05:39)  WBC: 7.11 (06-03 @ 08:14), 7.0 (06-01 @ 05:39)  Plt: 205 (06-03 @ 08:14), 236 (06-01 @ 05:39)      06-04    137  |  104  |  14  ----------------------------<  87  3.7   |  23  |  0.99    Ca    8.5      04 Jun 2019 04:02  Phos  2.9     06-04  Mg     2.1     06-04      LABS:  Na: 140 (06-03 @ 05:58), 138 (06-01 @ 05:39)  K: 4.1 (06-03 @ 05:58), 4.0 (06-01 @ 05:39)  Cl: 105 (06-03 @ 05:58), 102 (06-01 @ 05:39)  CO2: 24 (06-03 @ 05:58), 26 (06-01 @ 05:39)  BUN: 14 (06-03 @ 05:58), 19 (06-01 @ 05:39)  Cr: 1.10 (06-03 @ 05:58), 1.02 (06-01 @ 05:39)  Glu: 108(06-03 @ 05:58), 107(06-01 @ 05:39)       PRE- OP VA Duplex Carotid, Bilat (05.31.19 @ 12:50)   Bilateral calcified plaque, left greater than right.    Findings consistent with greater than 70% stenosis of the proximal left   internal carotid artery.    There is 50-69% stenosis of the proximal right internal carotid artery.    There are stenoses of both external carotid arteries.    These findings are not significantly changed compared to the prior study.          EXAMINATION:  General:  calm  HEENT: Pupils 2mm and reactive , face symmetric, nl gaze horizontal and vertical  Neuro: Motor- 5/5 Ue and LE prox=distal  Sensation intact   Cards:  Nl RR No s3 s4  Respiratory:  no respiratory distress  Abdomen:  soft, and non tender   Extremities:  no edema, DP/PT intact B/L   Skin:  warm/dry SUMMARY: 75 y/o RH  male with past medical history of Hemachromatosis w/ bi-annual phlebotomies (next due in 2 weeks from admission), HTN, HLD, Bladder Cancer, Bilateral Carotid stenosis on Full dose Aspirin presents to the ED sent in by private Neurologist (Dr. Deepak Sanford) for acute L MCA distribution infarcts and critical L ICA stenosis    PAST MEDICAL & SURGICAL HISTORY:  Hemochromatosis  Malignant neoplasm of bladder wall  Ex-heavy cigarette smoker (20-39 per day)  Osteoarthritis  Hyperlipemia  Hypertension  cardiac cath > 7 yrs. ago-neg-  at [facility]  H/O arthroscopy of knee- 2009  History of cystoscopy w/biopsy X 2 in 2004    On admission, the patient was:  GCS: 15  NIHSS:    REVIEW OF SYSTEMS:   [ X] All ROS addressed below are non-contributory,  Neuro: [ ] Headache [ ] Back pain [ ] Numbness [ ] Weakness [ ] Ataxia [ ] Dizziness [ ] Aphasia [ ] Dysarthria [ ] Visual disturbance  Resp: [ ] Shortness of breath/dyspnea, [ ] Orthopnea [ ] Cough  CV: [ ] Chest pain [ ] Palpitation [ ] Lightheadedness [ ] Syncope  Renal: [ ] Thirst [ ] Edema  GI: [ ] Nausea [ ] Emesis [ ] Abdominal pain [ ] Constipation [ ] Diarrhea  Hem: [ ] Hematemesis [ ] bright red blood per rectum  ID: [ ] Fever [ ] Chills [ ] Dysuria  ENT: [ ] Rhinorrhea    MEDICATIONS  (STANDING):  aspirin 325 milliGRAM(s) Oral daily  atorvastatin 80 milliGRAM(s) Oral at bedtime  clopidogrel Tablet 75 milliGRAM(s) Oral daily  docusate sodium 100 milliGRAM(s) Oral three times a day  enoxaparin Injectable 40 milliGRAM(s) SubCutaneous every 24 hours  senna 2 Tablet(s) Oral at bedtime  sodium chloride 0.9%. 1000 milliLiter(s) (75 mL/Hr) IV Continuous <Continuous>    MEDICATIONS  (PRN):  oxyCODONE    IR 5 milliGRAM(s) Oral every 4 hours PRN Moderate Pain (4 - 6)  oxyCODONE    IR 10 milliGRAM(s) Oral every 4 hours PRN Severe Pain (7 - 10)    ICU Vital Signs Last 24 Hrs  T(C): 36.3 (04 Jun 2019 04:00), Max: 36.7 (03 Jun 2019 16:54)  HR: 68 (04 Jun 2019 07:00) (62 - 88)  BP: 112/58 (04 Jun 2019 07:00) (88/50 - 155/77)  RR: 16 (04 Jun 2019 07:00) (15 - 18)  SpO2: 95% (04 Jun 2019 07:00) (90% - 100%)                          13.0   7.1   )-----------( 207      ( 04 Jun 2019 04:02 )             38.2   Hgb: 14.5 (06-03 @ 08:14), 14.5 (06-01 @ 05:39)  Hct: 43.3 (06-03 @ 08:14), 43.9 (06-01 @ 05:39)  WBC: 7.11 (06-03 @ 08:14), 7.0 (06-01 @ 05:39)  Plt: 205 (06-03 @ 08:14), 236 (06-01 @ 05:39)      06-04    137  |  104  |  14  ----------------------------<  87  3.7   |  23  |  0.99    Ca    8.5      04 Jun 2019 04:02  Phos  2.9     06-04  Mg     2.1     06-04      LABS:  Na: 140 (06-03 @ 05:58), 138 (06-01 @ 05:39)  K: 4.1 (06-03 @ 05:58), 4.0 (06-01 @ 05:39)  Cl: 105 (06-03 @ 05:58), 102 (06-01 @ 05:39)  CO2: 24 (06-03 @ 05:58), 26 (06-01 @ 05:39)  BUN: 14 (06-03 @ 05:58), 19 (06-01 @ 05:39)  Cr: 1.10 (06-03 @ 05:58), 1.02 (06-01 @ 05:39)  Glu: 108(06-03 @ 05:58), 107(06-01 @ 05:39)    6/3- 0500 P2Y12-  130    Nl Lipid profile     HGBA1C-        PRE- OP VA Duplex Carotid, Bilat (05.31.19 @ 12:50)   Bilateral calcified plaque, left greater than right.    Findings consistent with greater than 70% stenosis of the proximal left   internal carotid artery.    There is 50-69% stenosis of the proximal right internal carotid artery.    There are stenoses of both external carotid arteries.    These findings are not significantly changed compared to the prior study.          EXAMINATION:  General:  calm  HEENT: Pupils 2mm and reactive , face symmetric, nl gaze horizontal and vertical  Neuro: Motor- 5/5 Ue and LE prox=distal  Sensation intact   Cards:  Nl RR No s3 s4  Respiratory:  no respiratory distress  Abdomen:  soft, and non tender   Extremities:  no edema, DP/PT intact B/L   Skin:  warm/dry SUMMARY: 73 y/o RH  male with past medical history of Hemachromatosis w/ bi-annual phlebotomies (next due in 2 weeks from admission), HTN, HLD, Bladder Cancer, Bilateral Carotid stenosis on Full dose Aspirin presents to the ED sent in by private Neurologist (Dr. Deepak Sanford) for acute L MCA distribution infarcts and critical L ICA stenosis    PAST MEDICAL & SURGICAL HISTORY:  Hemochromatosis  Malignant neoplasm of bladder wall  Ex-heavy cigarette smoker (20-39 per day)  Osteoarthritis  Hyperlipemia  Hypertension  cardiac cath > 7 yrs. ago-neg-  at [facility]  H/O arthroscopy of knee- 2009  History of cystoscopy w/biopsy X 2 in 2004    On admission, the patient was:  GCS: 15  NIHSS:    REVIEW OF SYSTEMS:   [ X] All ROS addressed below are non-contributory,  Neuro: [ ] Headache [ ] Back pain [ ] Numbness [ ] Weakness [ ] Ataxia [ ] Dizziness [ ] Aphasia [ ] Dysarthria [ ] Visual disturbance  Resp: [ ] Shortness of breath/dyspnea, [ ] Orthopnea [ ] Cough  CV: [ ] Chest pain [ ] Palpitation [ ] Lightheadedness [ ] Syncope  Renal: [ ] Thirst [ ] Edema  GI: [ ] Nausea [ ] Emesis [ ] Abdominal pain [ ] Constipation [ ] Diarrhea  Hem: [ ] Hematemesis [ ] bright red blood per rectum  ID: [ ] Fever [ ] Chills [ ] Dysuria  ENT: [ ] Rhinorrhea    MEDICATIONS  (STANDING):  aspirin 325 milliGRAM(s) Oral daily  atorvastatin 80 milliGRAM(s) Oral at bedtime  clopidogrel Tablet 75 milliGRAM(s) Oral daily  docusate sodium 100 milliGRAM(s) Oral three times a day  enoxaparin Injectable 40 milliGRAM(s) SubCutaneous every 24 hours  senna 2 Tablet(s) Oral at bedtime  sodium chloride 0.9%. 1000 milliLiter(s) (75 mL/Hr) IV Continuous <Continuous>    MEDICATIONS  (PRN):  oxyCODONE    IR 5 milliGRAM(s) Oral every 4 hours PRN Moderate Pain (4 - 6)  oxyCODONE    IR 10 milliGRAM(s) Oral every 4 hours PRN Severe Pain (7 - 10)    ICU Vital Signs Last 24 Hrs  T(C): 36.3 (04 Jun 2019 04:00), Max: 36.7 (03 Jun 2019 16:54)  HR: 68 (04 Jun 2019 07:00) (62 - 88)  BP: 112/58 (04 Jun 2019 07:00) (88/50 - 155/77)  RR: 16 (04 Jun 2019 07:00) (15 - 18)  SpO2: 95% (04 Jun 2019 07:00) (90% - 100%)                          13.0   7.1   )-----------( 207      ( 04 Jun 2019 04:02 )             38.2   Hgb: 14.5 (06-03 @ 08:14), 14.5 (06-01 @ 05:39)  Hct: 43.3 (06-03 @ 08:14), 43.9 (06-01 @ 05:39)  WBC: 7.11 (06-03 @ 08:14), 7.0 (06-01 @ 05:39)  Plt: 205 (06-03 @ 08:14), 236 (06-01 @ 05:39)      06-04    137  |  104  |  14  ----------------------------<  87  3.7   |  23  |  0.99    Ca    8.5      04 Jun 2019 04:02  Phos  2.9     06-04  Mg     2.1     06-04      LABS:  Na: 140 (06-03 @ 05:58), 138 (06-01 @ 05:39)  K: 4.1 (06-03 @ 05:58), 4.0 (06-01 @ 05:39)  Cl: 105 (06-03 @ 05:58), 102 (06-01 @ 05:39)  CO2: 24 (06-03 @ 05:58), 26 (06-01 @ 05:39)  BUN: 14 (06-03 @ 05:58), 19 (06-01 @ 05:39)  Cr: 1.10 (06-03 @ 05:58), 1.02 (06-01 @ 05:39)  Glu: 108(06-03 @ 05:58), 107(06-01 @ 05:39)    6/3- 0500 P2Y12-  130    Nl Lipid profile     HGBA1C- 5.8       PRE- OP VA Duplex Carotid, Bilat (05.31.19 @ 12:50)   Bilateral calcified plaque, left greater than right.    Findings consistent with greater than 70% stenosis of the proximal left   internal carotid artery.    There is 50-69% stenosis of the proximal right internal carotid artery.    There are stenoses of both external carotid arteries.    These findings are not significantly changed compared to the prior study.          EXAMINATION:  General:  calm  HEENT: Pupils 2mm and reactive , face symmetric, nl gaze horizontal and vertical  Neuro: Motor- 5/5 Ue and LE prox=distal  Sensation intact   Cards:  Nl RR No s3 s4  Respiratory:  no respiratory distress  Abdomen:  soft, and non tender   Extremities:  no edema, DP/PT intact B/L   Skin:  warm/dry

## 2019-06-07 PROBLEM — E83.119 HEMOCHROMATOSIS, UNSPECIFIED: Chronic | Status: ACTIVE | Noted: 2019-05-30

## 2019-06-12 ENCOUNTER — APPOINTMENT (OUTPATIENT)
Dept: NEUROSURGERY | Facility: CLINIC | Age: 74
End: 2019-06-12

## 2019-06-12 ENCOUNTER — NON-APPOINTMENT (OUTPATIENT)
Age: 74
End: 2019-06-12

## 2019-06-12 ENCOUNTER — APPOINTMENT (OUTPATIENT)
Dept: UROLOGY | Facility: CLINIC | Age: 74
End: 2019-06-12
Payer: MEDICARE

## 2019-06-12 ENCOUNTER — APPOINTMENT (OUTPATIENT)
Dept: CARDIOLOGY | Facility: CLINIC | Age: 74
End: 2019-06-12
Payer: MEDICARE

## 2019-06-12 ENCOUNTER — OUTPATIENT (OUTPATIENT)
Dept: OUTPATIENT SERVICES | Facility: HOSPITAL | Age: 74
LOS: 1 days | End: 2019-06-12
Payer: MEDICARE

## 2019-06-12 VITALS
HEIGHT: 68 IN | HEART RATE: 80 BPM | SYSTOLIC BLOOD PRESSURE: 140 MMHG | BODY MASS INDEX: 31.98 KG/M2 | DIASTOLIC BLOOD PRESSURE: 55 MMHG | WEIGHT: 211 LBS | OXYGEN SATURATION: 97 % | RESPIRATION RATE: 12 BRPM

## 2019-06-12 VITALS — SYSTOLIC BLOOD PRESSURE: 154 MMHG | DIASTOLIC BLOOD PRESSURE: 59 MMHG

## 2019-06-12 DIAGNOSIS — R35.0 FREQUENCY OF MICTURITION: ICD-10-CM

## 2019-06-12 PROCEDURE — 52000 CYSTOURETHROSCOPY: CPT

## 2019-06-12 PROCEDURE — 99214 OFFICE O/P EST MOD 30 MIN: CPT | Mod: 25

## 2019-06-12 NOTE — HISTORY OF PRESENT ILLNESS
[FreeTextEntry1] : Mak  had an episode of word salad for 10 minutes this past SUnday night. He went to ER. He went to [facility] in [place]. Carotid doppler, CT scan and ECHO. He now feels tired. He continues to lose words. He stopped aspirin for two weeks because of hematuria. He restarted the aspirin last night.

## 2019-06-12 NOTE — PHYSICAL EXAM
[General Appearance - Well Developed] : well developed [Normal Appearance] : normal appearance [General Appearance - Well Nourished] : well nourished [Well Groomed] : well groomed [No Deformities] : no deformities [General Appearance - In No Acute Distress] : no acute distress [Normal Conjunctiva] : the conjunctiva exhibited no abnormalities [No Oral Pallor] : no oral pallor [Normal Oral Mucosa] : normal oral mucosa [Eyelids - No Xanthelasma] : the eyelids demonstrated no xanthelasmas [Normal Jugular Venous A Waves Present] : normal jugular venous A waves present [No Oral Cyanosis] : no oral cyanosis [Normal Jugular Venous V Waves Present] : normal jugular venous V waves present [No Jugular Venous [place] A Waves] : no jugular venous [place] A waves [Auscultation Breath Sounds / Voice Sounds] : lungs were clear to auscultation bilaterally [Exaggerated Use Of Accessory Muscles For Inspiration] : no accessory muscle use [Respiration, Rhythm And Depth] : normal respiratory rhythm and effort [Murmurs] : no murmurs present [Heart Sounds] : normal S1 and S2 [Heart Rate And Rhythm] : heart rate and rhythm were normal [Abdomen Tenderness] : non-tender [Abdomen Soft] : soft [Abdomen Mass (___ Cm)] : no abdominal mass palpated [Gait - Sufficient For Exercise Testing] : the gait was sufficient for exercise testing [Abnormal Walk] : normal gait [Nail Clubbing] : no clubbing of the fingernails [Cyanosis, Localized] : no localized cyanosis [Skin Color & Pigmentation] : normal skin color and pigmentation [Petechial Hemorrhages (___cm)] : no petechial hemorrhages [Skin Lesions] : no skin lesions [] : no rash [No Venous Stasis] : no venous stasis [No Skin Ulcers] : no skin ulcer [No Xanthoma] : no  xanthoma was observed [Mood] : the mood was normal [Affect] : the affect was normal [Oriented To Time, Place, And Person] : oriented to person, place, and time [No Anxiety] : not feeling anxious

## 2019-06-12 NOTE — DISCUSSION/SUMMARY
[___ Month(s)] : [unfilled] month(s) [FreeTextEntry1] : The patient is a 74-year-old gentleman history of bladder cancer, carotid artery stenosis. hypertension, hyperlipidemia s/p MCA stroke off aspirin now s/p LICA stent.\par #1 ALEX- on DAPT s/p LICA stent, word salad \par #2 Htn- controlled on valsartan/hctz\par #3 Lipids- now on atorvastatin 80mg\par #4 - s/p hematuria episode, negative cystoscopy today, f/u Dr. Mann\par #5 CV- ECHO negative, nuclear stress ordered

## 2019-06-13 LAB
APPEARANCE: CLEAR
BACTERIA: NEGATIVE
BILIRUBIN URINE: NEGATIVE
BLOOD URINE: NEGATIVE
COLOR: YELLOW
GLUCOSE QUALITATIVE U: NEGATIVE
HYALINE CASTS: 0 /LPF
KETONES URINE: NEGATIVE
LEUKOCYTE ESTERASE URINE: NEGATIVE
MICROSCOPIC-UA: NORMAL
NITRITE URINE: NEGATIVE
PH URINE: 6.5
PROTEIN URINE: NEGATIVE
PSA FREE FLD-MCNC: 11 %
PSA FREE SERPL-MCNC: 0.39 NG/ML
PSA SERPL-MCNC: 3.48 NG/ML
RED BLOOD CELLS URINE: 2 /HPF
SPECIFIC GRAVITY URINE: 1.02
SQUAMOUS EPITHELIAL CELLS: 2 /HPF
UROBILINOGEN URINE: NORMAL
WHITE BLOOD CELLS URINE: 2 /HPF

## 2019-06-14 LAB — URINE CYTOLOGY: NORMAL

## 2019-06-17 DIAGNOSIS — N40.1 BENIGN PROSTATIC HYPERPLASIA WITH LOWER URINARY TRACT SYMPTOMS: ICD-10-CM

## 2019-06-17 DIAGNOSIS — C67.9 MALIGNANT NEOPLASM OF BLADDER, UNSPECIFIED: ICD-10-CM

## 2019-06-24 ENCOUNTER — OUTPATIENT (OUTPATIENT)
Dept: OUTPATIENT SERVICES | Facility: HOSPITAL | Age: 74
LOS: 1 days | End: 2019-06-24
Payer: MEDICARE

## 2019-06-24 ENCOUNTER — APPOINTMENT (OUTPATIENT)
Dept: CV DIAGNOSTICS | Facility: HOSPITAL | Age: 74
End: 2019-06-24

## 2019-06-24 DIAGNOSIS — I25.10 ATHEROSCLEROTIC HEART DISEASE OF NATIVE CORONARY ARTERY WITHOUT ANGINA PECTORIS: ICD-10-CM

## 2019-06-24 PROCEDURE — 78452 HT MUSCLE IMAGE SPECT MULT: CPT

## 2019-06-24 PROCEDURE — 93017 CV STRESS TEST TRACING ONLY: CPT

## 2019-06-24 PROCEDURE — A9500: CPT

## 2019-06-24 PROCEDURE — 78452 HT MUSCLE IMAGE SPECT MULT: CPT | Mod: 26

## 2019-06-24 PROCEDURE — 93016 CV STRESS TEST SUPVJ ONLY: CPT

## 2019-06-24 PROCEDURE — 93018 CV STRESS TEST I&R ONLY: CPT

## 2019-07-05 ENCOUNTER — RX RENEWAL (OUTPATIENT)
Age: 74
End: 2019-07-05

## 2019-07-05 ENCOUNTER — APPOINTMENT (OUTPATIENT)
Dept: INTERNAL MEDICINE | Facility: CLINIC | Age: 74
End: 2019-07-05
Payer: MEDICARE

## 2019-07-05 VITALS
WEIGHT: 206 LBS | BODY MASS INDEX: 31.22 KG/M2 | TEMPERATURE: 98.8 F | HEART RATE: 76 BPM | OXYGEN SATURATION: 95 % | HEIGHT: 68 IN

## 2019-07-05 VITALS — DIASTOLIC BLOOD PRESSURE: 80 MMHG | SYSTOLIC BLOOD PRESSURE: 170 MMHG

## 2019-07-05 DIAGNOSIS — N21.0 CALCULUS IN BLADDER: ICD-10-CM

## 2019-07-05 PROCEDURE — 99214 OFFICE O/P EST MOD 30 MIN: CPT | Mod: 25

## 2019-07-05 PROCEDURE — 99205 OFFICE O/P NEW HI 60 MIN: CPT | Mod: 25

## 2019-07-05 PROCEDURE — 36415 COLL VENOUS BLD VENIPUNCTURE: CPT

## 2019-07-05 PROCEDURE — 93000 ELECTROCARDIOGRAM COMPLETE: CPT

## 2019-07-05 NOTE — HEALTH RISK ASSESSMENT
[Yes] : Yes [2 - 3 times a week (3 pts)] : 2 - 3  times a week (3 points) [Never (0 pts)] : Never (0 points) [1 or 2 (0 pts)] : 1 or 2 (0 points) [No falls in past year] : Patient reported no falls in the past year [0] : 1) Little interest or pleasure doing things: Not at all (0) [de-identified] : Cardiologist [] : No [[id number]] : 0 [Audit-CScore] : 3

## 2019-07-05 NOTE — HISTORY OF PRESENT ILLNESS
[FreeTextEntry1] : Routine Evaluation [de-identified] : Mr. Rossi is a 75yo M with h/o HTN, bladder cancer, recent TIA s/p carotid artery stent placement 6/3/19 presenting for routine evaluation.\par Pt has acute complaint of hematuria. He states he has had 5 episodes since February in which they only lasted for a day. He is not experiencing an episode of hematuria currently but is concerned. His last episode was about 3 weeks ago. He saw a urologist whom did a cystoscopy with no findings.\par Pt states he is doing well since his TIA and stent placement and is almost completely back to baseline. He is being followed by neurology.\par There are no other complaints.

## 2019-07-05 NOTE — PHYSICAL EXAM
[No Acute Distress] : no acute distress [Well-Appearing] : well-appearing [No JVD] : no jugular venous distention [Normal Sclera/Conjunctiva] : normal sclera/conjunctiva [Normal Outer Ear/Nose] : the outer ears and nose were normal in appearance [No Lymphadenopathy] : no lymphadenopathy [No Respiratory Distress] : no respiratory distress  [Thyroid Normal, No Nodules] : the thyroid was normal and there were no nodules present [No Carotid Bruits] : no carotid bruits [Clear to Auscultation] : lungs were clear to auscultation bilaterally [No Accessory Muscle Use] : no accessory muscle use [Non Tender] : non-tender [Soft] : abdomen soft [No Edema] : there was no peripheral edema [Normal Posterior Cervical Nodes] : no posterior cervical lymphadenopathy [Non-distended] : non-distended [Normal Supraclavicular Nodes] : no supraclavicular lymphadenopathy [Normal Anterior Cervical Nodes] : no anterior cervical lymphadenopathy [No CVA Tenderness] : no CVA  tenderness [No Rash] : no rash [No Joint Swelling] : no joint swelling [No Focal Deficits] : no focal deficits [Alert and Oriented x3] : oriented to person, place, and time [de-identified] : irregular rhythm noted

## 2019-07-05 NOTE — ASSESSMENT
[FreeTextEntry1] : Pt is a 73yo M with extensive pmh presenting for routine evaluation.\par On exam, pt had acute complaint of intermittent episodes of hematuria. He has had 5 episodes that each last for a day since February. Urology assessed with no findings. Pt is not currently experiencing an episode of hematuria today.\par Pt's BP is extremely elevated on exam at 170/80.\par Pt looks well otherwise.\par EKG completed - sinus rhythm with PVCs and RBBB noted

## 2019-07-05 NOTE — PLAN
[FreeTextEntry1] : Blood drawn for routine labs.\par EKG completed.\par Urine sample collected.\par Add Amlodipine 5mg to BP medication regimen.\par RTC 3 weeks.

## 2019-07-05 NOTE — REVIEW OF SYSTEMS
[Hematuria] : hematuria [Negative] : Heme/Lymph [FreeTextEntry8] : intermittent episodes of hematuria

## 2019-07-06 LAB
ALBUMIN SERPL ELPH-MCNC: 4.5 G/DL
ALP BLD-CCNC: 120 U/L
ALT SERPL-CCNC: 24 U/L
ANION GAP SERPL CALC-SCNC: 15 MMOL/L
APPEARANCE: CLEAR
AST SERPL-CCNC: 32 U/L
BACTERIA: NEGATIVE
BASOPHILS # BLD AUTO: 0.03 K/UL
BASOPHILS NFR BLD AUTO: 0.4 %
BILIRUB SERPL-MCNC: 0.4 MG/DL
BILIRUBIN URINE: NEGATIVE
BLOOD URINE: NEGATIVE
BUN SERPL-MCNC: 24 MG/DL
CALCIUM SERPL-MCNC: 9.6 MG/DL
CHLORIDE SERPL-SCNC: 101 MMOL/L
CHOLEST SERPL-MCNC: 138 MG/DL
CHOLEST/HDLC SERPL: 2.8 RATIO
CO2 SERPL-SCNC: 23 MMOL/L
COLOR: YELLOW
CREAT SERPL-MCNC: 1.3 MG/DL
EOSINOPHIL # BLD AUTO: 0.23 K/UL
EOSINOPHIL NFR BLD AUTO: 3 %
ESTIMATED AVERAGE GLUCOSE: 114 MG/DL
GLUCOSE QUALITATIVE U: NEGATIVE
GLUCOSE SERPL-MCNC: 88 MG/DL
HBA1C MFR BLD HPLC: 5.6 %
HCT VFR BLD CALC: 40.8 %
HDLC SERPL-MCNC: 49 MG/DL
HGB BLD-MCNC: 13 G/DL
HYALINE CASTS: 0 /LPF
IMM GRANULOCYTES NFR BLD AUTO: 0.1 %
KETONES URINE: NEGATIVE
LDLC SERPL CALC-MCNC: 71 MG/DL
LEUKOCYTE ESTERASE URINE: NEGATIVE
LYMPHOCYTES # BLD AUTO: 2.66 K/UL
LYMPHOCYTES NFR BLD AUTO: 34.5 %
MAN DIFF?: NORMAL
MCHC RBC-ENTMCNC: 30.2 PG
MCHC RBC-ENTMCNC: 31.9 GM/DL
MCV RBC AUTO: 94.7 FL
MICROSCOPIC-UA: NORMAL
MONOCYTES # BLD AUTO: 0.69 K/UL
MONOCYTES NFR BLD AUTO: 8.9 %
NEUTROPHILS # BLD AUTO: 4.1 K/UL
NEUTROPHILS NFR BLD AUTO: 53.1 %
NITRITE URINE: NEGATIVE
PH URINE: 6
PLATELET # BLD AUTO: 162 K/UL
POTASSIUM SERPL-SCNC: 4.3 MMOL/L
PROT SERPL-MCNC: 6.7 G/DL
PROTEIN URINE: NORMAL
RBC # BLD: 4.31 M/UL
RBC # FLD: 13.3 %
RED BLOOD CELLS URINE: 1 /HPF
SODIUM SERPL-SCNC: 139 MMOL/L
SPECIFIC GRAVITY URINE: 1.02
SQUAMOUS EPITHELIAL CELLS: 2 /HPF
T4 FREE SERPL-MCNC: 1.1 NG/DL
TRIGL SERPL-MCNC: 90 MG/DL
TSH SERPL-ACNC: 0.92 UIU/ML
UROBILINOGEN URINE: NORMAL
WBC # FLD AUTO: 7.72 K/UL
WHITE BLOOD CELLS URINE: 2 /HPF

## 2019-07-21 LAB — BACTERIA UR CULT: NORMAL

## 2019-08-02 ENCOUNTER — APPOINTMENT (OUTPATIENT)
Dept: INTERNAL MEDICINE | Facility: CLINIC | Age: 74
End: 2019-08-02
Payer: MEDICARE

## 2019-08-02 VITALS
HEIGHT: 68 IN | BODY MASS INDEX: 30.46 KG/M2 | WEIGHT: 201 LBS | TEMPERATURE: 98.1 F | OXYGEN SATURATION: 97 % | HEART RATE: 84 BPM

## 2019-08-02 PROCEDURE — 99214 OFFICE O/P EST MOD 30 MIN: CPT

## 2019-08-02 NOTE — HISTORY OF PRESENT ILLNESS
[FreeTextEntry1] : returns for check of BP on norvasc [de-identified] : no current complaints.\par BP at home has been well controlled\par no current medical complaints

## 2019-08-02 NOTE — PHYSICAL EXAM
[No Acute Distress] : no acute distress [No Respiratory Distress] : no respiratory distress  [No Accessory Muscle Use] : no accessory muscle use [Regular Rhythm] : with a regular rhythm [Normal Rate] : normal rate  [No Edema] : there was no peripheral edema [Soft] : abdomen soft [Normal Gait] : normal gait [Alert and Oriented x3] : oriented to person, place, and time

## 2019-08-02 NOTE — HEALTH RISK ASSESSMENT
[Yes] : Yes [Monthly or less (1 pt)] : Monthly or less (1 point) [1 or 2 (0 pts)] : 1 or 2 (0 points) [Never (0 pts)] : Never (0 points) [No] : In the past 12 months have you used drugs other than those required for medical reasons? No [No falls in past year] : Patient reported no falls in the past year [0] : 2) Feeling down, depressed, or hopeless: Not at all (0) [] : No [de-identified] : No [Audit-CScore] : 0 [de-identified] : Walking, golfing [de-identified] : No [[id number]] : 0

## 2019-08-02 NOTE — ASSESSMENT
[FreeTextEntry1] : blood pressure well controlled\par will continue norvasc\par prior labs discussed

## 2019-08-16 ENCOUNTER — MEDICATION RENEWAL (OUTPATIENT)
Age: 74
End: 2019-08-16

## 2019-08-16 ENCOUNTER — RX RENEWAL (OUTPATIENT)
Age: 74
End: 2019-08-16

## 2019-09-12 ENCOUNTER — FORM ENCOUNTER (OUTPATIENT)
Age: 74
End: 2019-09-12

## 2019-09-13 ENCOUNTER — OUTPATIENT (OUTPATIENT)
Dept: OUTPATIENT SERVICES | Facility: HOSPITAL | Age: 74
LOS: 1 days | End: 2019-09-13
Payer: MEDICARE

## 2019-09-13 ENCOUNTER — APPOINTMENT (OUTPATIENT)
Dept: CT IMAGING | Facility: CLINIC | Age: 74
End: 2019-09-13
Payer: MEDICARE

## 2019-09-13 DIAGNOSIS — N40.1 BENIGN PROSTATIC HYPERPLASIA WITH LOWER URINARY TRACT SYMPTOMS: ICD-10-CM

## 2019-09-13 DIAGNOSIS — N13.8 OTHER OBSTRUCTIVE AND REFLUX UROPATHY: ICD-10-CM

## 2019-09-13 PROCEDURE — 74178 CT ABD&PLV WO CNTR FLWD CNTR: CPT

## 2019-09-13 PROCEDURE — 74178 CT ABD&PLV WO CNTR FLWD CNTR: CPT | Mod: 26

## 2019-09-13 PROCEDURE — 82565 ASSAY OF CREATININE: CPT

## 2019-09-19 ENCOUNTER — APPOINTMENT (OUTPATIENT)
Dept: UROLOGY | Facility: CLINIC | Age: 74
End: 2019-09-19
Payer: MEDICARE

## 2019-09-19 PROCEDURE — 99214 OFFICE O/P EST MOD 30 MIN: CPT

## 2019-09-20 LAB
APPEARANCE: CLEAR
BACTERIA: NEGATIVE
BILIRUBIN URINE: NEGATIVE
BLOOD URINE: NEGATIVE
COLOR: NORMAL
GLUCOSE QUALITATIVE U: NEGATIVE
HYALINE CASTS: 0 /LPF
KETONES URINE: NEGATIVE
LEUKOCYTE ESTERASE URINE: NEGATIVE
MICROSCOPIC-UA: NORMAL
NITRITE URINE: NEGATIVE
PH URINE: 6
PROTEIN URINE: NEGATIVE
RED BLOOD CELLS URINE: 0 /HPF
SPECIFIC GRAVITY URINE: 1.01
SQUAMOUS EPITHELIAL CELLS: 2 /HPF
UROBILINOGEN URINE: NORMAL
WHITE BLOOD CELLS URINE: 3 /HPF

## 2019-09-22 LAB — URINE CYTOLOGY: NORMAL

## 2019-09-23 ENCOUNTER — RX RENEWAL (OUTPATIENT)
Age: 74
End: 2019-09-23

## 2019-10-18 ENCOUNTER — RX RENEWAL (OUTPATIENT)
Age: 74
End: 2019-10-18

## 2019-10-25 ENCOUNTER — TRANSCRIPTION ENCOUNTER (OUTPATIENT)
Age: 74
End: 2019-10-25

## 2019-10-31 ENCOUNTER — APPOINTMENT (OUTPATIENT)
Dept: CARDIOLOGY | Facility: CLINIC | Age: 74
End: 2019-10-31
Payer: MEDICARE

## 2019-10-31 ENCOUNTER — OUTPATIENT (OUTPATIENT)
Dept: OUTPATIENT SERVICES | Facility: HOSPITAL | Age: 74
LOS: 1 days | End: 2019-10-31

## 2019-10-31 ENCOUNTER — NON-APPOINTMENT (OUTPATIENT)
Age: 74
End: 2019-10-31

## 2019-10-31 VITALS
DIASTOLIC BLOOD PRESSURE: 62 MMHG | RESPIRATION RATE: 14 BRPM | HEART RATE: 60 BPM | OXYGEN SATURATION: 98 % | TEMPERATURE: 97 F | HEIGHT: 67.25 IN | SYSTOLIC BLOOD PRESSURE: 134 MMHG | WEIGHT: 197.09 LBS

## 2019-10-31 VITALS
WEIGHT: 195 LBS | HEIGHT: 68 IN | SYSTOLIC BLOOD PRESSURE: 150 MMHG | BODY MASS INDEX: 29.55 KG/M2 | OXYGEN SATURATION: 97 % | HEART RATE: 79 BPM | DIASTOLIC BLOOD PRESSURE: 68 MMHG

## 2019-10-31 DIAGNOSIS — Z98.49 CATARACT EXTRACTION STATUS, UNSPECIFIED EYE: Chronic | ICD-10-CM

## 2019-10-31 DIAGNOSIS — Z01.818 ENCOUNTER FOR OTHER PREPROCEDURAL EXAMINATION: ICD-10-CM

## 2019-10-31 DIAGNOSIS — C67.9 MALIGNANT NEOPLASM OF BLADDER, UNSPECIFIED: ICD-10-CM

## 2019-10-31 DIAGNOSIS — Z96.652 PRESENCE OF LEFT ARTIFICIAL KNEE JOINT: Chronic | ICD-10-CM

## 2019-10-31 DIAGNOSIS — Z96.651 PRESENCE OF RIGHT ARTIFICIAL KNEE JOINT: Chronic | ICD-10-CM

## 2019-10-31 DIAGNOSIS — R06.83 SNORING: ICD-10-CM

## 2019-10-31 DIAGNOSIS — I10 ESSENTIAL (PRIMARY) HYPERTENSION: ICD-10-CM

## 2019-10-31 DIAGNOSIS — Z86.79 PERSONAL HISTORY OF OTHER DISEASES OF THE CIRCULATORY SYSTEM: Chronic | ICD-10-CM

## 2019-10-31 LAB
ALBUMIN SERPL ELPH-MCNC: 4.7 G/DL — SIGNIFICANT CHANGE UP (ref 3.3–5)
ALP SERPL-CCNC: 150 U/L — HIGH (ref 40–120)
ALT FLD-CCNC: 41 U/L — SIGNIFICANT CHANGE UP (ref 4–41)
ANION GAP SERPL CALC-SCNC: 13 MMO/L — SIGNIFICANT CHANGE UP (ref 7–14)
AST SERPL-CCNC: 40 U/L — SIGNIFICANT CHANGE UP (ref 4–40)
BILIRUB SERPL-MCNC: 0.3 MG/DL — SIGNIFICANT CHANGE UP (ref 0.2–1.2)
BLD GP AB SCN SERPL QL: NEGATIVE — SIGNIFICANT CHANGE UP
BUN SERPL-MCNC: 16 MG/DL — SIGNIFICANT CHANGE UP (ref 7–23)
CALCIUM SERPL-MCNC: 10.2 MG/DL — SIGNIFICANT CHANGE UP (ref 8.4–10.5)
CHLORIDE SERPL-SCNC: 97 MMOL/L — LOW (ref 98–107)
CO2 SERPL-SCNC: 30 MMOL/L — SIGNIFICANT CHANGE UP (ref 22–31)
CREAT SERPL-MCNC: 1.16 MG/DL — SIGNIFICANT CHANGE UP (ref 0.5–1.3)
GLUCOSE SERPL-MCNC: 63 MG/DL — LOW (ref 70–99)
HCT VFR BLD CALC: 44.5 % — SIGNIFICANT CHANGE UP (ref 39–50)
HGB BLD-MCNC: 14.6 G/DL — SIGNIFICANT CHANGE UP (ref 13–17)
MCHC RBC-ENTMCNC: 29.6 PG — SIGNIFICANT CHANGE UP (ref 27–34)
MCHC RBC-ENTMCNC: 32.8 % — SIGNIFICANT CHANGE UP (ref 32–36)
MCV RBC AUTO: 90.3 FL — SIGNIFICANT CHANGE UP (ref 80–100)
NRBC # FLD: 0 K/UL — SIGNIFICANT CHANGE UP (ref 0–0)
PLATELET # BLD AUTO: 247 K/UL — SIGNIFICANT CHANGE UP (ref 150–400)
PMV BLD: 10.4 FL — SIGNIFICANT CHANGE UP (ref 7–13)
POTASSIUM SERPL-MCNC: 4.2 MMOL/L — SIGNIFICANT CHANGE UP (ref 3.5–5.3)
POTASSIUM SERPL-SCNC: 4.2 MMOL/L — SIGNIFICANT CHANGE UP (ref 3.5–5.3)
PROT SERPL-MCNC: 7.8 G/DL — SIGNIFICANT CHANGE UP (ref 6–8.3)
RBC # BLD: 4.93 M/UL — SIGNIFICANT CHANGE UP (ref 4.2–5.8)
RBC # FLD: 13.7 % — SIGNIFICANT CHANGE UP (ref 10.3–14.5)
RH IG SCN BLD-IMP: POSITIVE — SIGNIFICANT CHANGE UP
SODIUM SERPL-SCNC: 140 MMOL/L — SIGNIFICANT CHANGE UP (ref 135–145)
WBC # BLD: 10.03 K/UL — SIGNIFICANT CHANGE UP (ref 3.8–10.5)
WBC # FLD AUTO: 10.03 K/UL — SIGNIFICANT CHANGE UP (ref 3.8–10.5)

## 2019-10-31 PROCEDURE — 99215 OFFICE O/P EST HI 40 MIN: CPT

## 2019-10-31 PROCEDURE — 93000 ELECTROCARDIOGRAM COMPLETE: CPT

## 2019-10-31 RX ORDER — SODIUM CHLORIDE 9 MG/ML
1000 INJECTION, SOLUTION INTRAVENOUS
Refills: 0 | Status: DISCONTINUED | OUTPATIENT
Start: 2019-11-08 | End: 2019-11-11

## 2019-10-31 RX ORDER — UBIDECARENONE 100 MG
200 CAPSULE ORAL
Qty: 0 | Refills: 0 | DISCHARGE

## 2019-10-31 NOTE — H&P PST ADULT - NEGATIVE OPHTHALMOLOGIC SYMPTOMS
no blurred vision L/no blurred vision R/no pain R/no irritation L/no irritation R/no diplopia/no photophobia/no discharge R/no pain L/no discharge L

## 2019-10-31 NOTE — H&P PST ADULT - NEGATIVE ENMT SYMPTOMS
no gum bleeding/no throat pain/no nasal congestion/no nasal obstruction/no nasal discharge/no tinnitus/no nose bleeds/no abnormal taste sensation/no dry mouth/no hearing difficulty/no ear pain/no post-nasal discharge/no recurrent cold sores/no vertigo/no sinus symptoms/no dysphagia

## 2019-10-31 NOTE — H&P PST ADULT - NSICDXPROBLEM_GEN_ALL_CORE_FT
PROBLEM DIAGNOSES  Problem: Malignant neoplasm of bladder, unspecified  Assessment and Plan: Patient is scheduled for Left partial nephrectomy scheduled on 11/8/2019.   Preop instructions, pepcid, surgical scrub provided. Pt stated understanding. Teach back method utilized.   Pending cardiology evalaution per surgeon and PST, history of Hypertension, History of Carotid artery angioplasty and stenting 6/2019-- placed on Asprin 325mg and Plavix - managed by cardiologist per patient.   Plavix and Asprin plan- Instructed patient to confirm Asprin and Plavix plan with cardiologist and confirm plan with Dr. Mann.   Per Dr. Mann's note Patient to hold Plavix 5 days prior to surgery, Notified Patient's cardiologist.   Awaiting written instructions from    Patient verbalzied understanding.   Pending last Neurology note- per PST- history of stroke.   Pending last Hematology note -per PST- patient with a history of Hemachromatosis.       Problem: Snoring  Assessment and Plan: ALEX precautions, OR booking notified.      Problem: Hypertension  Assessment and Plan: Patient instructed to take Valsartan/Hydrochlorathiazide in the AM of surgery with a sip of water. PROBLEM DIAGNOSES  Problem: Malignant neoplasm of bladder, unspecified  Assessment and Plan: Patient is scheduled for Left partial nephrectomy scheduled on 11/8/2019.   Preop instructions, pepcid, surgical scrub provided. Pt stated understanding. Teach back method utilized.   Pending cardiology evalaution per surgeon and PST, history of Hypertension, History of Carotid artery angioplasty and stenting 6/2019-- placed on Asprin 325mg and Plavix - managed by cardiologist per patient.   Plavix and Asprin plan- Instructed patient to confirm Asprin 325mg  and Plavix plan with cardiologist and confirm plan with Dr. Mann.   Per Dr. Mann's note Patient to hold Plavix 5 days prior to surgery, Notified Patient's cardiologist.   Awaiting written instructions from    Patient verbalzied understanding.   Pending last Neurology note- per PST- history of stroke.   Pending last Hematology note -per PST- patient with a history of Hemachromatosis.       Problem: Snoring  Assessment and Plan: ALEX precautions, OR booking notified.      Problem: Hypertension  Assessment and Plan: Patient instructed to take Valsartan/Hydrochlorathiazide in the AM of surgery with a sip of water.

## 2019-10-31 NOTE — H&P PST ADULT - HISTORY OF PRESENT ILLNESS
Patient is  74 year old male male with past medical history of Hemachromatosis w/ bi-annual phlebotomies (next due in December 2019, HTN, HLD, Bladder Cancer, Carotid stenosis, history of stroke in May 2019. Patient reports recent history of hematuria, " CT scan showed a left upper pole transitional carcinoma of the bladder". Patient was referred to Dr. Mann for an evaluation.  Pre op diagnosis: Malignant neoplasm of bladder, unspecified. Patient is scheduled for Left partial nephrectomy scheduled on 11/8/2019. Patient is  74 year old male male with past medical history ofw/ bi-annual phlebotomies (next due in December 2019, HTN, HLD, Bladder Cancer, Carotid artery angioplasty and stenting, history of stroke in May 2019. Patient reports recent history of hematuria, " CT scan showed a left upper pole transitional carcinoma of the bladder". Patient was referred to Dr. Mann for an evaluation.  Pre op diagnosis: Malignant neoplasm of bladder, unspecified. Patient is scheduled for Left partial nephrectomy scheduled on 11/8/2019. Patient is  74 year old male male with past medical history of w/ bi-annual phlebotomies (next due in December 2019, HTN, HLD, Bladder Cancer, Carotid artery angioplasty and stenting, history of stroke in May 2019. Patient reports recent history of hematuria, " CT scan showed a left upper pole transitional carcinoma of the bladder". Patient was referred to Dr. Mann for an evaluation.  Pre op diagnosis: Malignant neoplasm of bladder, unspecified. Patient is scheduled for Left partial nephrectomy scheduled on 11/8/2019.

## 2019-10-31 NOTE — H&P PST ADULT - ATTENDING COMMENTS
74-year-old right-handed white gentleman first evaluated at [facility] on 5/31/19 with aphasia. He has been followed conservatively for several years with asymptomatic carotid stenosis. He had been on aspirin, but aspirin was stopped 3 weeks prior to admission due to hematuria. On 5/25/19, he developed virtual inability to generate speech, which was severely "garbled" for about 10 minutes. At the same time, he had difficulty putting on his pants but without obvious motor deficits. His severe speech difficulties lasted about 10 minutes, but he was left with residual mild word finding difficulty. He was seen by Dr. Deepak Leone (neurology) who obtained an MRI and sent him to the ED. He has a history of hemochromatosis. He quit smoking about 15 years prior to admission. He is a retired [place] . ROS otherwise negative. Exam. Alert and attentive; speech fluent, prosodic, with no word finding difficulty or paraphasic errors; followed crossed commands; repetition intact; gait not tested; remainder of neurologic exam is nonfocal. MRI brain (5/30/19) to my eye showed a small-moderate-sized acute right MCA infarct involving the insula and high frontal convexity, and a punctate component in the left parietal cortex. CTA neck and head (5/30/19) to my eye showed a severe extracranial left ICA stenosis, and a moderate right ICA stenosis. Impression. He had known bilateral asymptomatic carotid stenosis which had been followed conservatively. He had stopped taking aspirin about 3 weeks prior to admission due to hematuria. On 5/25/19, he developed probable motor aphasia and possibly apraxia, which subsequently improved but probably not quite back to baseline. His symptoms were due to acute left MCA infarction, most likely due to symptomatic severe left carotid stenosis, which should be confirmed. Plan. Carotid Doppler; TTE; aspirin; he should benefit from expeditious revascularization of his left carotid artery, probably endarterectomy with stenting as an option; possibly a candidate for the Stroke AF study; PT/OT.

## 2019-10-31 NOTE — H&P PST ADULT - NEGATIVE BREAST SYMPTOMS
no breast tenderness L/no breast lump R/no nipple discharge R/no breast lump L/no nipple discharge L/no breast tenderness R

## 2019-10-31 NOTE — H&P PST ADULT - MUSCULOSKELETAL
details… detailed exam no joint erythema/normal strength/no calf tenderness/no joint warmth/ROM intact/no joint swelling

## 2019-10-31 NOTE — H&P PST ADULT - NSICDXPASTSURGICALHX_GEN_ALL_CORE_FT
PAST SURGICAL HISTORY:  cardiac cath > 7 yrs. ago-neg-  at [facility]     H/O arthroscopy of knee- 2009     H/O total knee replacement, right     History of carotid artery stenosis Carotid artery angioplasty and stent placed in June 2019    History of cystoscopy w/biopsy X 2 in 2004     History of total knee replacement, left     S/P cataract surgery PAST SURGICAL HISTORY:  cardiac cath > 7 yrs. ago-neg-  at [facility]     H/O arthroscopy of knee- 2009     H/O total knee replacement, right     History of carotid artery stenosis Carotid artery angioplasty and stenting 6/2019    History of cataract surgery     History of cystoscopy w/biopsy X 2 in 2004     History of total knee replacement, left

## 2019-10-31 NOTE — H&P PST ADULT - NEGATIVE GENERAL GENITOURINARY SYMPTOMS
no dysuria/normal urinary frequency/no nocturia/no flank pain L/no flank pain R/no urine discoloration/no hematuria/no bladder infections/no incontinence/no urinary hesitancy

## 2019-10-31 NOTE — H&P PST ADULT - NEGATIVE GASTROINTESTINAL SYMPTOMS
no constipation/no abdominal pain/no change in bowel habits/no melena/no vomiting/no nausea/no diarrhea

## 2019-10-31 NOTE — H&P PST ADULT - LAST CARDIAC ANGIOGRAM/IMAGING
" I am not sure when " History of Carotid artery angioplasty and stenting 6/2019- per patient at [facility]

## 2019-10-31 NOTE — H&P PST ADULT - NSICDXPASTMEDICALHX_GEN_ALL_CORE_FT
PAST MEDICAL HISTORY:  H/O carotid artery stenosis     Hemochromatosis     Hyperlipemia     Hypertension     Malignant neoplasm of bladder wall     Malignant neoplasm of bladder, unspecified     Osteoarthritis     Stroke

## 2019-10-31 NOTE — H&P PST ADULT - NEGATIVE MUSCULOSKELETAL SYMPTOMS
no back pain/no leg pain R/no joint swelling/no muscle cramps/no stiffness/no neck pain/no muscle weakness/no leg pain L/no myalgia/no arm pain L/no arm pain R

## 2019-10-31 NOTE — H&P PST ADULT - NEGATIVE NEUROLOGICAL SYMPTOMS
no focal seizures/no loss of sensation/no generalized seizures/no syncope/no difficulty walking/no tremors/no vertigo/no paresthesias/no transient paralysis/no weakness/no headache/no confusion

## 2019-10-31 NOTE — H&P PST ADULT - ASSESSMENT
Pre op diagnosis: Malignant neoplasm of bladder, unspecified. Patient is scheduled for Left partial nephrectomy scheduled on 11/8/2019.     History of Carotid artery angioplasty and stenting

## 2019-10-31 NOTE — H&P PST ADULT - RS GEN PE MLT RESP DETAILS PC
breath sounds equal/clear to auscultation bilaterally/airway patent/respirations non-labored/good air movement/no rhonchi/no wheezes/no rales

## 2019-11-02 LAB
BACTERIA UR CULT: SIGNIFICANT CHANGE UP
SPECIMEN SOURCE: SIGNIFICANT CHANGE UP

## 2019-11-06 NOTE — DISCUSSION/SUMMARY
[Procedure Intermediate Risk] : the procedure risk is intermediate [Patient Intermediate Risk] : the patient is an intermediate risk [Optimized for Surgery] : the patient is optimized for surgery [FreeTextEntry1] : The patient is a 74-year-old gentleman history of bladder cancer, carotid artery stenosis. hypertension, hyperlipidemia s/p MCA stroke,  LICA stent now with renal cell cancer.\par #1 ALEX- on DAPT s/p LICA stent \par #2 Htn- controlled on valsartan/hctz\par #3 Lipids-on atorvastatin 80mg\par #4 - There are no cardiac contraindications to nephrectomy.\par #5 CV- ECHO negative, nuclear stress negative\par \par addendum: no contraindication to holding plavix for five days prior and changing to aspirin 81mg the week before surgery

## 2019-11-06 NOTE — HISTORY OF PRESENT ILLNESS
[Preoperative Visit] : for a medical evaluation prior to surgery [Scheduled Procedure ___] : a [unfilled] [Date of Surgery ___] : on [unfilled] [Surgeon Name ___] : surgeon: [unfilled] [FreeTextEntry1] : Mak had episode of hematuria and diagnosed with renal cell. No recurrence of word salad. No chest pain, palpitations or shortness of breath.

## 2019-11-07 ENCOUNTER — TRANSCRIPTION ENCOUNTER (OUTPATIENT)
Age: 74
End: 2019-11-07

## 2019-11-08 ENCOUNTER — RESULT REVIEW (OUTPATIENT)
Age: 74
End: 2019-11-08

## 2019-11-08 ENCOUNTER — APPOINTMENT (OUTPATIENT)
Dept: UROLOGY | Facility: HOSPITAL | Age: 74
End: 2019-11-08

## 2019-11-08 ENCOUNTER — INPATIENT (INPATIENT)
Facility: HOSPITAL | Age: 74
LOS: 3 days | Discharge: HOME | End: 2019-11-12
Attending: UROLOGY | Admitting: UROLOGY
Payer: MEDICARE

## 2019-11-08 VITALS
HEART RATE: 69 BPM | SYSTOLIC BLOOD PRESSURE: 132 MMHG | WEIGHT: 197.09 LBS | HEIGHT: 67.25 IN | TEMPERATURE: 98 F | DIASTOLIC BLOOD PRESSURE: 65 MMHG | RESPIRATION RATE: 16 BRPM | OXYGEN SATURATION: 97 %

## 2019-11-08 DIAGNOSIS — I10 ESSENTIAL (PRIMARY) HYPERTENSION: ICD-10-CM

## 2019-11-08 DIAGNOSIS — Z96.652 PRESENCE OF LEFT ARTIFICIAL KNEE JOINT: Chronic | ICD-10-CM

## 2019-11-08 DIAGNOSIS — Z96.651 PRESENCE OF RIGHT ARTIFICIAL KNEE JOINT: Chronic | ICD-10-CM

## 2019-11-08 DIAGNOSIS — C67.9 MALIGNANT NEOPLASM OF BLADDER, UNSPECIFIED: ICD-10-CM

## 2019-11-08 DIAGNOSIS — Z98.49 CATARACT EXTRACTION STATUS, UNSPECIFIED EYE: Chronic | ICD-10-CM

## 2019-11-08 DIAGNOSIS — E83.119 HEMOCHROMATOSIS, UNSPECIFIED: ICD-10-CM

## 2019-11-08 DIAGNOSIS — I65.22 OCCLUSION AND STENOSIS OF LEFT CAROTID ARTERY: ICD-10-CM

## 2019-11-08 DIAGNOSIS — Z29.9 ENCOUNTER FOR PROPHYLACTIC MEASURES, UNSPECIFIED: ICD-10-CM

## 2019-11-08 DIAGNOSIS — D49.519 NEOPLASM OF UNSPECIFIED BEHAVIOR OF UNSPECIFIED KIDNEY: ICD-10-CM

## 2019-11-08 DIAGNOSIS — Z86.79 PERSONAL HISTORY OF OTHER DISEASES OF THE CIRCULATORY SYSTEM: Chronic | ICD-10-CM

## 2019-11-08 LAB
ANION GAP SERPL CALC-SCNC: 13 MMO/L — SIGNIFICANT CHANGE UP (ref 7–14)
BUN SERPL-MCNC: 14 MG/DL — SIGNIFICANT CHANGE UP (ref 7–23)
CALCIUM SERPL-MCNC: 9 MG/DL — SIGNIFICANT CHANGE UP (ref 8.4–10.5)
CHLORIDE SERPL-SCNC: 101 MMOL/L — SIGNIFICANT CHANGE UP (ref 98–107)
CO2 SERPL-SCNC: 23 MMOL/L — SIGNIFICANT CHANGE UP (ref 22–31)
CREAT SERPL-MCNC: 1.2 MG/DL — SIGNIFICANT CHANGE UP (ref 0.5–1.3)
GLUCOSE SERPL-MCNC: 130 MG/DL — HIGH (ref 70–99)
HCT VFR BLD CALC: 36.8 % — LOW (ref 39–50)
HGB BLD-MCNC: 12.3 G/DL — LOW (ref 13–17)
MCHC RBC-ENTMCNC: 29.8 PG — SIGNIFICANT CHANGE UP (ref 27–34)
MCHC RBC-ENTMCNC: 33.4 % — SIGNIFICANT CHANGE UP (ref 32–36)
MCV RBC AUTO: 89.1 FL — SIGNIFICANT CHANGE UP (ref 80–100)
NRBC # FLD: 0 K/UL — SIGNIFICANT CHANGE UP (ref 0–0)
PLATELET # BLD AUTO: 224 K/UL — SIGNIFICANT CHANGE UP (ref 150–400)
PMV BLD: 10.7 FL — SIGNIFICANT CHANGE UP (ref 7–13)
POTASSIUM SERPL-MCNC: 4 MMOL/L — SIGNIFICANT CHANGE UP (ref 3.5–5.3)
POTASSIUM SERPL-SCNC: 4 MMOL/L — SIGNIFICANT CHANGE UP (ref 3.5–5.3)
RBC # BLD: 4.13 M/UL — LOW (ref 4.2–5.8)
RBC # FLD: 13.5 % — SIGNIFICANT CHANGE UP (ref 10.3–14.5)
RH IG SCN BLD-IMP: POSITIVE — SIGNIFICANT CHANGE UP
SODIUM SERPL-SCNC: 137 MMOL/L — SIGNIFICANT CHANGE UP (ref 135–145)
WBC # BLD: 18.3 K/UL — HIGH (ref 3.8–10.5)
WBC # FLD AUTO: 18.3 K/UL — HIGH (ref 3.8–10.5)

## 2019-11-08 PROCEDURE — 88307 TISSUE EXAM BY PATHOLOGIST: CPT | Mod: 26

## 2019-11-08 PROCEDURE — 88312 SPECIAL STAINS GROUP 1: CPT | Mod: 26

## 2019-11-08 PROCEDURE — 50236 REMOVAL OF KIDNEY & URETER: CPT | Mod: LT

## 2019-11-08 PROCEDURE — 99223 1ST HOSP IP/OBS HIGH 75: CPT

## 2019-11-08 PROCEDURE — 88305 TISSUE EXAM BY PATHOLOGIST: CPT | Mod: 26

## 2019-11-08 RX ORDER — CEFAZOLIN SODIUM 1 G
2000 VIAL (EA) INJECTION ONCE
Refills: 0 | Status: COMPLETED | OUTPATIENT
Start: 2019-11-08 | End: 2019-11-08

## 2019-11-08 RX ORDER — NALOXONE HYDROCHLORIDE 4 MG/.1ML
0.1 SPRAY NASAL
Refills: 0 | Status: DISCONTINUED | OUTPATIENT
Start: 2019-11-08 | End: 2019-11-11

## 2019-11-08 RX ORDER — CEFAZOLIN SODIUM 1 G
VIAL (EA) INJECTION
Refills: 0 | Status: DISCONTINUED | OUTPATIENT
Start: 2019-11-08 | End: 2019-11-12

## 2019-11-08 RX ORDER — ACETAMINOPHEN 500 MG
1000 TABLET ORAL EVERY 6 HOURS
Refills: 0 | Status: COMPLETED | OUTPATIENT
Start: 2019-11-08 | End: 2019-11-09

## 2019-11-08 RX ORDER — FENTANYL CITRATE 50 UG/ML
25 INJECTION INTRAVENOUS
Refills: 0 | Status: DISCONTINUED | OUTPATIENT
Start: 2019-11-08 | End: 2019-11-08

## 2019-11-08 RX ORDER — ONDANSETRON 8 MG/1
4 TABLET, FILM COATED ORAL EVERY 6 HOURS
Refills: 0 | Status: DISCONTINUED | OUTPATIENT
Start: 2019-11-08 | End: 2019-11-11

## 2019-11-08 RX ORDER — HEPARIN SODIUM 5000 [USP'U]/ML
5000 INJECTION INTRAVENOUS; SUBCUTANEOUS EVERY 8 HOURS
Refills: 0 | Status: DISCONTINUED | OUTPATIENT
Start: 2019-11-08 | End: 2019-11-12

## 2019-11-08 RX ORDER — FENTANYL CITRATE 50 UG/ML
50 INJECTION INTRAVENOUS
Refills: 0 | Status: DISCONTINUED | OUTPATIENT
Start: 2019-11-08 | End: 2019-11-08

## 2019-11-08 RX ORDER — CEFAZOLIN SODIUM 1 G
2000 VIAL (EA) INJECTION EVERY 8 HOURS
Refills: 0 | Status: DISCONTINUED | OUTPATIENT
Start: 2019-11-08 | End: 2019-11-12

## 2019-11-08 RX ORDER — HYDROMORPHONE HYDROCHLORIDE 2 MG/ML
0.5 INJECTION INTRAMUSCULAR; INTRAVENOUS; SUBCUTANEOUS ONCE
Refills: 0 | Status: DISCONTINUED | OUTPATIENT
Start: 2019-11-08 | End: 2019-11-08

## 2019-11-08 RX ORDER — HYDROMORPHONE HYDROCHLORIDE 2 MG/ML
30 INJECTION INTRAMUSCULAR; INTRAVENOUS; SUBCUTANEOUS
Refills: 0 | Status: DISCONTINUED | OUTPATIENT
Start: 2019-11-08 | End: 2019-11-10

## 2019-11-08 RX ORDER — SODIUM CHLORIDE 9 MG/ML
500 INJECTION, SOLUTION INTRAVENOUS ONCE
Refills: 0 | Status: COMPLETED | OUTPATIENT
Start: 2019-11-08 | End: 2019-11-08

## 2019-11-08 RX ADMIN — HYDROMORPHONE HYDROCHLORIDE 30 MILLILITER(S): 2 INJECTION INTRAMUSCULAR; INTRAVENOUS; SUBCUTANEOUS at 15:30

## 2019-11-08 RX ADMIN — Medication 100 MILLIGRAM(S): at 18:56

## 2019-11-08 RX ADMIN — HYDROMORPHONE HYDROCHLORIDE 0.5 MILLIGRAM(S): 2 INJECTION INTRAMUSCULAR; INTRAVENOUS; SUBCUTANEOUS at 16:40

## 2019-11-08 RX ADMIN — SODIUM CHLORIDE 1000 MILLILITER(S): 9 INJECTION, SOLUTION INTRAVENOUS at 16:21

## 2019-11-08 RX ADMIN — HYDROMORPHONE HYDROCHLORIDE 0.5 MILLIGRAM(S): 2 INJECTION INTRAMUSCULAR; INTRAVENOUS; SUBCUTANEOUS at 16:25

## 2019-11-08 RX ADMIN — HYDROMORPHONE HYDROCHLORIDE 30 MILLILITER(S): 2 INJECTION INTRAMUSCULAR; INTRAVENOUS; SUBCUTANEOUS at 20:28

## 2019-11-08 RX ADMIN — HEPARIN SODIUM 5000 UNIT(S): 5000 INJECTION INTRAVENOUS; SUBCUTANEOUS at 22:29

## 2019-11-08 RX ADMIN — Medication 400 MILLIGRAM(S): at 23:59

## 2019-11-08 NOTE — CONSULT NOTE ADULT - ASSESSMENT
74 year old male male with past medical history of hemochromatosis on bi-annual phlebotomies (next due in December 2019), HTN, HLD, Bladder Cancer, TIA and found to have carotid artery stenosis s/p LICA stent 6/3/19, recently diagnosed w/ L renal lesion, admitted for open left partial nephrectomy.

## 2019-11-08 NOTE — CONSULT NOTE ADULT - PROBLEM SELECTOR RECOMMENDATION 9
s/p open L partial nephrectomy   pain control w/ Dilaudid PCA pump  Prophylactic Cefazolin   monitor GI function   Incentive spirometry

## 2019-11-08 NOTE — ASU PATIENT PROFILE, ADULT - PMH
H/O carotid artery stenosis    Hemochromatosis    Hyperlipemia    Hypertension    Malignant neoplasm of bladder wall    Malignant neoplasm of bladder, unspecified    Osteoarthritis    Stroke

## 2019-11-08 NOTE — CONSULT NOTE ADULT - SUBJECTIVE AND OBJECTIVE BOX
HPI:  74 year old male male with past medical history of hemochromatosis on bi-annual phlebotomies (next due in December 2019), HTN, HLD, Bladder Cancer, TIA and found to have carotid artery stenosis s/p LICA stent 6/3/19, recently diagnosed w/ L renal lesion, admitted for open left partial nephrectomy. Pt states that he started having hematuria in February, about 5 episodes of intermittent hematuria each lasting less than a day. CT scan showed L renal lesion. Patient was referred to Dr. Mann for an evaluation. He underwent open L partial nephrectomy, currently c/o mild abdominal pain and some nausea. Pt w/ hemochromatosis that is well managed with phlebotomy twice a year. He also w/ TIA in May 2019. He was found to have carotid stenosis and underwent stent to LICA. Patient on DAPT. He was seen by cardiology for pre-op, Plavix held 5 days prior to procedure and ASA decreased to 81mg 1 week prior to procedure. Medicine consulted for comanagement of HTN and antiplatelet therapy.       PAST MEDICAL & SURGICAL HISTORY:  Stroke  H/O carotid artery stenosis  Malignant neoplasm of bladder, unspecified  Hemochromatosis  Malignant neoplasm of bladder wall  Osteoarthritis  Hyperlipemia  Hypertension  History of cataract surgery  History of carotid artery stenosis: Carotid artery angioplasty and stenting 6/2019  History of total knee replacement, left  H/O total knee replacement, right  cardiac cath > 7 yrs. ago-neg-  at [facility]  H/O arthroscopy of knee- 2009  History of cystoscopy w/biopsy X 2 in 2004      Review of Systems:   CONSTITUTIONAL: No fever, weight loss, or fatigue  EYES: No eye pain, visual disturbances, or discharge  ENMT:  No difficulty hearing, tinnitus, vertigo; No sinus or throat pain  NECK: No pain or stiffness  BREASTS: No pain, masses, or nipple discharge  RESPIRATORY: No cough, wheezing, chills or hemoptysis; No shortness of breath  CARDIOVASCULAR: No chest pain, palpitations, dizziness, or leg swelling  GASTROINTESTINAL: +abdominal pain. +nausea, no vomiting, or hematemesis; No diarrhea or constipation. No melena or hematochezia.  GENITOURINARY: No dysuria, frequency, hematuria, or incontinence  NEUROLOGICAL: No headaches, memory loss, loss of strength, numbness, or tremors  SKIN: No itching, burning, rashes, or lesions   LYMPH NODES: No enlarged glands  ENDOCRINE: No heat or cold intolerance; No hair loss  MUSCULOSKELETAL: No joint pain or swelling; No muscle, back, or extremity pain  PSYCHIATRIC: No depression, anxiety, mood swings, or difficulty sleeping  HEME/LYMPH: No easy bruising, or bleeding gums  ALLERY AND IMMUNOLOGIC: No hives or eczema    Allergies    Indocin SR (Faint)    Intolerances      Social History:   Never smoked   Occasional alcohol use     FAMILY HISTORY:  FH: colon cancer: Mother  FH: kidney cancer: Father      HOME MEDICATIONS:  valsartan-hydrochlorothiazide 80mg-12.5mg oral tablet: Last Dose Taken:  , 1 tab(s) orally once a day  amLODIPine 5 mg oral tablet: Last Dose Taken:  , 1 tab(s) orally once a day  atorvastatin 80 mg oral tablet: Last Dose Taken:  , 1 tab(s) orally once a day  tamsulosin 0.4 mg oral capsule: Last Dose Taken:  , 1 cap(s) orally once a day  clopidogrel 75 mg oral tablet: Last Dose Taken:  , 1 tab(s) orally once a day  aspirin 325 mg oral tablet: Last Dose Taken:  , 1 tab(s) orally once a day  CoQ10: Last Dose Taken:  , 200 milligram(s) orally once a day LD 11/1/19      Vital Signs Last 24 Hrs  T(C): 36.9 (08 Nov 2019 14:40), Max: 36.9 (08 Nov 2019 14:40)  T(F): 98.4 (08 Nov 2019 14:40), Max: 98.4 (08 Nov 2019 14:40)  HR: 83 (08 Nov 2019 14:50) (69 - 92)  BP: 105/35 (08 Nov 2019 14:50) (97/65 - 132/65)  BP(mean): 54 (08 Nov 2019 14:50) (54 - 70)  RR: 11 (08 Nov 2019 14:50) (10 - 16)  SpO2: 95% (08 Nov 2019 14:50) (93% - 97%)  CAPILLARY BLOOD GLUCOSE        I&O's Summary      PHYSICAL EXAM:  GENERAL: NAD    EYES: EOMI, PERRLA, conjunctiva and sclera clear  NECK: Supple, No JVD  CHEST/LUNG: Clear to auscultation bilaterally; No wheeze  HEART: Regular rate and rhythm; No murmurs, rubs, or gallops  ABDOMEN: Soft, Nontender, Nondistended; Bowel sounds present  EXTREMITIES:  2+ Peripheral Pulses, No clubbing, cyanosis, or edema  : +Grace   PSYCH: AAOx3  NEUROLOGY: non-focal  SKIN: No rashes or lesions    LABS:    Labs from 10/31 reviewed:   WBC 10.3  Hgb 14  Hct 44  Platelets 247    Na 140  K 4.2  BUN/Cr 16/1.1   T bili 0.3  AST 40  ALT 41        RADIOLOGY & ADDITIONAL TESTS:    Imaging Personally Reviewed:  CT from September reviewed:   L upper pole renal uroepithelial lesion.     TTE from June 2019 reviewed:  1. Endocardium not well visualized; grossly normal left  ventricular systolic function.  2. The right ventricle is not well visualized; grossly  normal right ventricular systolic function.    Consultant(s) Notes Reviewed:      Care Discussed with Consultants/Other Providers:

## 2019-11-08 NOTE — PROGRESS NOTE ADULT - SUBJECTIVE AND OBJECTIVE BOX
Note    Post op Check    s/p : L open nephroureterectomy    Pt seen / examined without complaints pain controlled    Vital Signs Last 24 Hrs  T(C): 36.9 (08 Nov 2019 14:40), Max: 36.9 (08 Nov 2019 14:40)  T(F): 98.4 (08 Nov 2019 14:40), Max: 98.4 (08 Nov 2019 14:40)  HR: 86 (08 Nov 2019 17:15) (69 - 92)  BP: 115/57 (08 Nov 2019 17:00) (97/45 - 132/65)  BP(mean): 69 (08 Nov 2019 17:00) (54 - 70)  RR: 15 (08 Nov 2019 17:15) (10 - 19)  SpO2: 100% (08 Nov 2019 17:15) (93% - 100%)    I&O's Summary    08 Nov 2019 07:01  -  08 Nov 2019 17:41  --------------------------------------------------------  IN: 590 mL / OUT: 100 mL / NET: 490 mL        PHYSICAL EXAM:       Constitutional:    Respiratory:    Cardiovascular:    Gastrointestinal:    Genitourinary:    Extremities:                                        12.3   18.30 )-----------( 224      ( 08 Nov 2019 15:30 )             36.8       11-08    137  |  101  |  14  ----------------------------<  130<H>  4.0   |  23  |  1.20    Ca    9.0      08 Nov 2019 15:30                Strict I&O's  Analgesia Antiemetics  DVT prophylaxis  Incentive spirometry  Clears / OOB  AM labs  Note    Post op Check    s/p : L open nephroureterectomy    Pt seen / examined without complaints pain controlled on PCA    Vital Signs Last 24 Hrs  T(C): 36.9 (08 Nov 2019 14:40), Max: 36.9 (08 Nov 2019 14:40)  T(F): 98.4 (08 Nov 2019 14:40), Max: 98.4 (08 Nov 2019 14:40)  HR: 86 (08 Nov 2019 17:15) (69 - 92)  BP: 115/57 (08 Nov 2019 17:00) (97/45 - 132/65)  BP(mean): 69 (08 Nov 2019 17:00) (54 - 70)  RR: 15 (08 Nov 2019 17:15) (10 - 19)  SpO2: 100% (08 Nov 2019 17:15) (93% - 100%)    I&O's Summary    08 Nov 2019 07:01  -  08 Nov 2019 17:41  --------------------------------------------------------  IN: 590 mL / OUT: 100 mL / NET: 490 mL    RQE=797  Fol=140    PHYSICAL EXAM:       Constitutional: awake alert NAD    Respiratory: no resp distress    Cardiovascular: RRR    Gastrointestinal: soft, incisional dsg's CDI, appropriately tender    Genitourinary: french draining well yelllow    Extremities: + venodynes                              12.3   18.30 )-----------( 224      ( 08 Nov 2019 15:30 )             36.8       11-08    137  |  101  |  14  ----------------------------<  130<H>  4.0   |  23  |  1.20    Ca    9.0      08 Nov 2019 15:30

## 2019-11-08 NOTE — ASU PATIENT PROFILE, ADULT - PSH
cardiac cath > 7 yrs. ago-neg-  at [facility]    H/O arthroscopy of knee- 2009    H/O total knee replacement, right    History of carotid artery stenosis  Carotid artery angioplasty and stenting 6/2019  History of cataract surgery    History of cystoscopy w/biopsy X 2 in 2004    History of total knee replacement, left

## 2019-11-09 LAB
ANION GAP SERPL CALC-SCNC: 10 MMO/L — SIGNIFICANT CHANGE UP (ref 7–14)
BUN SERPL-MCNC: 15 MG/DL — SIGNIFICANT CHANGE UP (ref 7–23)
CALCIUM SERPL-MCNC: 8.8 MG/DL — SIGNIFICANT CHANGE UP (ref 8.4–10.5)
CHLORIDE SERPL-SCNC: 100 MMOL/L — SIGNIFICANT CHANGE UP (ref 98–107)
CO2 SERPL-SCNC: 28 MMOL/L — SIGNIFICANT CHANGE UP (ref 22–31)
CREAT SERPL-MCNC: 1.47 MG/DL — HIGH (ref 0.5–1.3)
GLUCOSE SERPL-MCNC: 121 MG/DL — HIGH (ref 70–99)
HCT VFR BLD CALC: 34.6 % — LOW (ref 39–50)
HGB BLD-MCNC: 11.3 G/DL — LOW (ref 13–17)
MCHC RBC-ENTMCNC: 29.6 PG — SIGNIFICANT CHANGE UP (ref 27–34)
MCHC RBC-ENTMCNC: 32.7 % — SIGNIFICANT CHANGE UP (ref 32–36)
MCV RBC AUTO: 90.6 FL — SIGNIFICANT CHANGE UP (ref 80–100)
NRBC # FLD: 0 K/UL — SIGNIFICANT CHANGE UP (ref 0–0)
PLATELET # BLD AUTO: 190 K/UL — SIGNIFICANT CHANGE UP (ref 150–400)
PMV BLD: 10.5 FL — SIGNIFICANT CHANGE UP (ref 7–13)
POTASSIUM SERPL-MCNC: 4.5 MMOL/L — SIGNIFICANT CHANGE UP (ref 3.5–5.3)
POTASSIUM SERPL-SCNC: 4.5 MMOL/L — SIGNIFICANT CHANGE UP (ref 3.5–5.3)
RBC # BLD: 3.82 M/UL — LOW (ref 4.2–5.8)
RBC # FLD: 14 % — SIGNIFICANT CHANGE UP (ref 10.3–14.5)
SODIUM SERPL-SCNC: 138 MMOL/L — SIGNIFICANT CHANGE UP (ref 135–145)
WBC # BLD: 9.35 K/UL — SIGNIFICANT CHANGE UP (ref 3.8–10.5)
WBC # FLD AUTO: 9.35 K/UL — SIGNIFICANT CHANGE UP (ref 3.8–10.5)

## 2019-11-09 PROCEDURE — 99232 SBSQ HOSP IP/OBS MODERATE 35: CPT

## 2019-11-09 RX ADMIN — HYDROMORPHONE HYDROCHLORIDE 30 MILLILITER(S): 2 INJECTION INTRAMUSCULAR; INTRAVENOUS; SUBCUTANEOUS at 08:13

## 2019-11-09 RX ADMIN — HEPARIN SODIUM 5000 UNIT(S): 5000 INJECTION INTRAVENOUS; SUBCUTANEOUS at 14:30

## 2019-11-09 RX ADMIN — SODIUM CHLORIDE 125 MILLILITER(S): 9 INJECTION, SOLUTION INTRAVENOUS at 18:32

## 2019-11-09 RX ADMIN — Medication 100 MILLIGRAM(S): at 11:33

## 2019-11-09 RX ADMIN — Medication 400 MILLIGRAM(S): at 06:12

## 2019-11-09 RX ADMIN — Medication 100 MILLIGRAM(S): at 18:30

## 2019-11-09 RX ADMIN — HEPARIN SODIUM 5000 UNIT(S): 5000 INJECTION INTRAVENOUS; SUBCUTANEOUS at 06:12

## 2019-11-09 RX ADMIN — HEPARIN SODIUM 5000 UNIT(S): 5000 INJECTION INTRAVENOUS; SUBCUTANEOUS at 21:31

## 2019-11-09 RX ADMIN — ONDANSETRON 4 MILLIGRAM(S): 8 TABLET, FILM COATED ORAL at 11:51

## 2019-11-09 RX ADMIN — Medication 400 MILLIGRAM(S): at 18:14

## 2019-11-09 RX ADMIN — HYDROMORPHONE HYDROCHLORIDE 30 MILLILITER(S): 2 INJECTION INTRAMUSCULAR; INTRAVENOUS; SUBCUTANEOUS at 20:06

## 2019-11-09 RX ADMIN — Medication 100 MILLIGRAM(S): at 02:36

## 2019-11-09 RX ADMIN — SODIUM CHLORIDE 125 MILLILITER(S): 9 INJECTION, SOLUTION INTRAVENOUS at 08:14

## 2019-11-09 RX ADMIN — Medication 400 MILLIGRAM(S): at 12:37

## 2019-11-09 NOTE — PROGRESS NOTE ADULT - SUBJECTIVE AND OBJECTIVE BOX
S/P open left nephroureterectomy POD 1    Patient c/o mild incisional discomfort  denies any flatus, Denies N/V, fevers, chills rigors.  No other complaints.    AVSS, PO2 99% room air  Abdo:   surgical dressing removed, Incisions CDI  soft non tender  :  Grace 1000 clear UOP

## 2019-11-09 NOTE — PROGRESS NOTE ADULT - SUBJECTIVE AND OBJECTIVE BOX
[facility] Division of [facility]  Sd Sanford MD  Pager [phone]      Patient is a 74y old  Male who presents with a chief complaint of renal mass (08 Nov 2019 17:39)      SUBJECTIVE / OVERNIGHT EVENTS:    No acute event o/n. No new complaint     ADDITIONAL REVIEW OF SYSTEMS:    RESPIRATORY: No cough, wheezing, chills or hemoptysis; No shortness of breath  CARDIOVASCULAR: No chest pain, palpitations, dizziness, or leg swelling  GASTROINTESTINAL: No abdominal or epigastric pain. No nausea, vomiting, or hematemesis; No diarrhea or constipation. No melena or hematochezia.    MEDICATIONS  (STANDING):  acetaminophen  IVPB .. 1000 milliGRAM(s) IV Intermittent every 6 hours  ceFAZolin   IVPB      ceFAZolin   IVPB 2000 milliGRAM(s) IV Intermittent every 8 hours  heparin  Injectable 5000 Unit(s) SubCutaneous every 8 hours  HYDROmorphone PCA (1 mG/mL) 30 milliLiter(s) PCA Continuous PCA Continuous  lactated ringers. 1000 milliLiter(s) (125 mL/Hr) IV Continuous <Continuous>    MEDICATIONS  (PRN):  naloxone Injectable 0.1 milliGRAM(s) IV Push every 3 minutes PRN For ANY of the following changes in patient status:  A. RR LESS THAN 10 breaths per minute, B. Oxygen saturation LESS THAN 90%, C. Sedation score of 6  ondansetron Injectable 4 milliGRAM(s) IV Push every 6 hours PRN Nausea      CAPILLARY BLOOD GLUCOSE        I&O's Summary    08 Nov 2019 07:01  -  09 Nov 2019 07:00  --------------------------------------------------------  IN: 890 mL / OUT: 1055 mL / NET: -165 mL    09 Nov 2019 07:01  -  09 Nov 2019 16:24  --------------------------------------------------------  IN: 0 mL / OUT: 550 mL / NET: -550 mL        PHYSICAL EXAM:  Vital Signs Last 24 Hrs  T(C): 36.9 (09 Nov 2019 13:34), Max: 36.9 (09 Nov 2019 13:34)  T(F): 98.4 (09 Nov 2019 13:34), Max: 98.4 (09 Nov 2019 13:34)  HR: 73 (09 Nov 2019 13:34) (73 - 99)  BP: 118/47 (09 Nov 2019 13:34) (100/50 - 150/64)  BP(mean): 69 (08 Nov 2019 20:00) (56 - 70)  RR: 18 (09 Nov 2019 13:34) (9 - 18)  SpO2: 98% (09 Nov 2019 13:34) (94% - 100%)    CONSTITUTIONAL: NAD  EYES: PERRLA; conjunctiva and sclera clear  ENMT: Moist oral mucosa, no pharyngeal injection or exudates; normal dentition  NECK: Supple, no palpable masses; no thyromegaly  RESPIRATORY: Normal respiratory effort; lungs are clear to auscultation bilaterally  CARDIOVASCULAR: Regular rate and rhythm, normal S1 and S2, no murmur/rub/gallop; No lower extremity edema; Peripheral pulses are 2+ bilaterally  ABDOMEN: Nontender to palpation, normoactive bowel sounds, no rebound/guarding; No hepatosplenomegaly. VIRY drain in place   MUSCLOSKELETAL:  Normal gait; no clubbing or cyanosis of digits; no joint swelling or tenderness to palpation  PSYCH: A+O to person, place, and time; affect appropriate  NEUROLOGY: CN 2-12 are intact and symmetric; no gross sensory deficits;   SKIN: No rashes; no palpable lesions    LABS:                        11.3   9.35  )-----------( 190      ( 09 Nov 2019 05:50 )             34.6     11-09    138  |  100  |  15  ----------------------------<  121<H>  4.5   |  28  |  1.47<H>    Ca    8.8      09 Nov 2019 05:50                  RADIOLOGY & ADDITIONAL TESTS:  Results Reviewed:   Imaging Personally Reviewed:  Electrocardiogram Personally Reviewed:    COORDINATION OF CARE:  Care Discussed with Consultants/Other Providers [Y/N]:  Prior or Outpatient Records Reviewed [Y/N]:

## 2019-11-09 NOTE — PROGRESS NOTE ADULT - SUBJECTIVE AND OBJECTIVE BOX
Anesthesia Pain Management Service    SUBJECTIVE: Patient is doing well with IV PCA and no significant problems reported.    Pain Scale Score	At rest: ___ 	With Activity: ___ 	[X ] Refer to charted pain scores    THERAPY:    [ ] IV PCA Morphine		[ ] 5 mg/mL	[ ] 1 mg/mL  [X ] IV PCA Hydromorphone	[ ] 5 mg/mL	[X ] 1 mg/mL  [ ] IV PCA Fentanyl		[ ] 50 micrograms/mL    Demand dose __0.2_ lockout __6_ (minutes) Continuous Rate _0__ Total: _4.4__  Daily      MEDICATIONS  (STANDING):  acetaminophen  IVPB .. 1000 milliGRAM(s) IV Intermittent every 6 hours  ceFAZolin   IVPB      ceFAZolin   IVPB 2000 milliGRAM(s) IV Intermittent every 8 hours  heparin  Injectable 5000 Unit(s) SubCutaneous every 8 hours  HYDROmorphone PCA (1 mG/mL) 30 milliLiter(s) PCA Continuous PCA Continuous  lactated ringers. 1000 milliLiter(s) (125 mL/Hr) IV Continuous <Continuous>    MEDICATIONS  (PRN):  naloxone Injectable 0.1 milliGRAM(s) IV Push every 3 minutes PRN For ANY of the following changes in patient status:  A. RR LESS THAN 10 breaths per minute, B. Oxygen saturation LESS THAN 90%, C. Sedation score of 6  ondansetron Injectable 4 milliGRAM(s) IV Push every 6 hours PRN Nausea      OBJECTIVE:    Sedation Score:	[ X] Alert	[ ] Drowsy 	[ ] Arousable	[ ] Asleep	[ ] Unresponsive    Side Effects:	[X ] None	[ ] Nausea	[ ] Vomiting	[ ] Pruritus  		[ ] Other:    Vital Signs Last 24 Hrs  T(C): 36.7 (09 Nov 2019 09:52), Max: 36.9 (08 Nov 2019 14:40)  T(F): 98 (09 Nov 2019 09:52), Max: 98.4 (08 Nov 2019 14:40)  HR: 84 (09 Nov 2019 09:52) (79 - 99)  BP: 119/56 (09 Nov 2019 09:52) (97/45 - 150/64)  BP(mean): 69 (08 Nov 2019 20:00) (54 - 70)  RR: 16 (09 Nov 2019 09:52) (9 - 19)  SpO2: 98% (09 Nov 2019 09:52) (93% - 100%)    ASSESSMENT/ PLAN    Therapy to  be:	[ X] Continue   [ ] Discontinued   [ ] Change to prn Analgesics    Documentation and Verification of current medications:   [X] Done	[ ] Not done, not elligible    Comments:

## 2019-11-10 LAB
ANION GAP SERPL CALC-SCNC: 10 MMO/L — SIGNIFICANT CHANGE UP (ref 7–14)
BUN SERPL-MCNC: 14 MG/DL — SIGNIFICANT CHANGE UP (ref 7–23)
CALCIUM SERPL-MCNC: 8.9 MG/DL — SIGNIFICANT CHANGE UP (ref 8.4–10.5)
CHLORIDE SERPL-SCNC: 101 MMOL/L — SIGNIFICANT CHANGE UP (ref 98–107)
CO2 SERPL-SCNC: 26 MMOL/L — SIGNIFICANT CHANGE UP (ref 22–31)
CREAT SERPL-MCNC: 1.54 MG/DL — HIGH (ref 0.5–1.3)
GLUCOSE SERPL-MCNC: 89 MG/DL — SIGNIFICANT CHANGE UP (ref 70–99)
HCT VFR BLD CALC: 33.5 % — LOW (ref 39–50)
HGB BLD-MCNC: 10.6 G/DL — LOW (ref 13–17)
MCHC RBC-ENTMCNC: 29.1 PG — SIGNIFICANT CHANGE UP (ref 27–34)
MCHC RBC-ENTMCNC: 31.6 % — LOW (ref 32–36)
MCV RBC AUTO: 92 FL — SIGNIFICANT CHANGE UP (ref 80–100)
NRBC # FLD: 0 K/UL — SIGNIFICANT CHANGE UP (ref 0–0)
PLATELET # BLD AUTO: 162 K/UL — SIGNIFICANT CHANGE UP (ref 150–400)
PMV BLD: 11.1 FL — SIGNIFICANT CHANGE UP (ref 7–13)
POTASSIUM SERPL-MCNC: 4.3 MMOL/L — SIGNIFICANT CHANGE UP (ref 3.5–5.3)
POTASSIUM SERPL-SCNC: 4.3 MMOL/L — SIGNIFICANT CHANGE UP (ref 3.5–5.3)
RBC # BLD: 3.64 M/UL — LOW (ref 4.2–5.8)
RBC # FLD: 13.9 % — SIGNIFICANT CHANGE UP (ref 10.3–14.5)
SODIUM SERPL-SCNC: 137 MMOL/L — SIGNIFICANT CHANGE UP (ref 135–145)
WBC # BLD: 10.15 K/UL — SIGNIFICANT CHANGE UP (ref 3.8–10.5)
WBC # FLD AUTO: 10.15 K/UL — SIGNIFICANT CHANGE UP (ref 3.8–10.5)

## 2019-11-10 PROCEDURE — 99233 SBSQ HOSP IP/OBS HIGH 50: CPT

## 2019-11-10 RX ORDER — OXYCODONE HYDROCHLORIDE 5 MG/1
5 TABLET ORAL EVERY 4 HOURS
Refills: 0 | Status: DISCONTINUED | OUTPATIENT
Start: 2019-11-10 | End: 2019-11-12

## 2019-11-10 RX ORDER — OXYCODONE HYDROCHLORIDE 5 MG/1
10 TABLET ORAL EVERY 6 HOURS
Refills: 0 | Status: DISCONTINUED | OUTPATIENT
Start: 2019-11-10 | End: 2019-11-12

## 2019-11-10 RX ADMIN — Medication 100 MILLIGRAM(S): at 12:37

## 2019-11-10 RX ADMIN — HYDROMORPHONE HYDROCHLORIDE 30 MILLILITER(S): 2 INJECTION INTRAMUSCULAR; INTRAVENOUS; SUBCUTANEOUS at 08:29

## 2019-11-10 RX ADMIN — HEPARIN SODIUM 5000 UNIT(S): 5000 INJECTION INTRAVENOUS; SUBCUTANEOUS at 21:15

## 2019-11-10 RX ADMIN — Medication 100 MILLIGRAM(S): at 03:37

## 2019-11-10 RX ADMIN — HEPARIN SODIUM 5000 UNIT(S): 5000 INJECTION INTRAVENOUS; SUBCUTANEOUS at 14:58

## 2019-11-10 RX ADMIN — Medication 100 MILLIGRAM(S): at 19:20

## 2019-11-10 RX ADMIN — HEPARIN SODIUM 5000 UNIT(S): 5000 INJECTION INTRAVENOUS; SUBCUTANEOUS at 06:12

## 2019-11-10 NOTE — PROGRESS NOTE ADULT - SUBJECTIVE AND OBJECTIVE BOX
Anesthesia Pain Management Service    SUBJECTIVE: Patient is doing well with IV PCA and no significant problems reported.    Pain Scale Score	At rest: ___ 	With Activity: ___ 	[X ] Refer to charted pain scores    THERAPY:    [ ] IV PCA Morphine		[ ] 5 mg/mL	[ ] 1 mg/mL  [X ] IV PCA Hydromorphone	[ ] 5 mg/mL	[X ] 1 mg/mL  [ ] IV PCA Fentanyl		[ ] 50 micrograms/mL    Demand dose __0.2_ lockout __6_ (minutes) Continuous Rate _0__ Total: ___4.4  Daily      MEDICATIONS  (STANDING):  ceFAZolin   IVPB      ceFAZolin   IVPB 2000 milliGRAM(s) IV Intermittent every 8 hours  heparin  Injectable 5000 Unit(s) SubCutaneous every 8 hours  HYDROmorphone PCA (1 mG/mL) 30 milliLiter(s) PCA Continuous PCA Continuous  lactated ringers. 1000 milliLiter(s) (125 mL/Hr) IV Continuous <Continuous>    MEDICATIONS  (PRN):  naloxone Injectable 0.1 milliGRAM(s) IV Push every 3 minutes PRN For ANY of the following changes in patient status:  A. RR LESS THAN 10 breaths per minute, B. Oxygen saturation LESS THAN 90%, C. Sedation score of 6  ondansetron Injectable 4 milliGRAM(s) IV Push every 6 hours PRN Nausea      OBJECTIVE:    Sedation Score:	[ X] Alert	[ ] Drowsy 	[ ] Arousable	[ ] Asleep	[ ] Unresponsive    Side Effects:	[X ] None	[ ] Nausea	[ ] Vomiting	[ ] Pruritus  		[ ] Other:    Vital Signs Last 24 Hrs  T(C): 37 (10 Nov 2019 06:09), Max: 37 (10 Nov 2019 06:09)  T(F): 98.6 (10 Nov 2019 06:09), Max: 98.6 (10 Nov 2019 06:09)  HR: 87 (10 Nov 2019 06:09) (73 - 92)  BP: 125/49 (10 Nov 2019 06:09) (118/47 - 131/60)  BP(mean): --  RR: 16 (10 Nov 2019 06:09) (16 - 18)  SpO2: 99% (10 Nov 2019 06:09) (94% - 100%)    ASSESSMENT/ PLAN    Therapy to  be:	[ X] Continue   [ ] Discontinued   [ ] Change to prn Analgesics    Documentation and Verification of current medications:   [X] Done	[ ] Not done, not elligible    Comments:

## 2019-11-10 NOTE — PROGRESS NOTE ADULT - SUBJECTIVE AND OBJECTIVE BOX
[facility] Division of Hospital Medicine  Sd Sanford MD  Pager [phone]      Patient is a 74y old  Male who presents with a chief complaint of renal mass (08 Nov 2019 17:39)      SUBJECTIVE / OVERNIGHT EVENTS:    No acute event o/n. Pt reports pain is controlled. no new complaints     ADDITIONAL REVIEW OF SYSTEMS:    RESPIRATORY: No cough, wheezing, chills or hemoptysis; No shortness of breath  CARDIOVASCULAR: No chest pain, palpitations, dizziness, or leg swelling  GASTROINTESTINAL: No abdominal or epigastric pain. No nausea, vomiting, or hematemesis; No diarrhea or constipation. No melena or hematochezia.    MEDICATIONS  (STANDING):  ceFAZolin   IVPB      ceFAZolin   IVPB 2000 milliGRAM(s) IV Intermittent every 8 hours  heparin  Injectable 5000 Unit(s) SubCutaneous every 8 hours  HYDROmorphone PCA (1 mG/mL) 30 milliLiter(s) PCA Continuous PCA Continuous  lactated ringers. 1000 milliLiter(s) (125 mL/Hr) IV Continuous <Continuous>    MEDICATIONS  (PRN):  naloxone Injectable 0.1 milliGRAM(s) IV Push every 3 minutes PRN For ANY of the following changes in patient status:  A. RR LESS THAN 10 breaths per minute, B. Oxygen saturation LESS THAN 90%, C. Sedation score of 6  ondansetron Injectable 4 milliGRAM(s) IV Push every 6 hours PRN Nausea      CAPILLARY BLOOD GLUCOSE        I&O's Summary    09 Nov 2019 07:01  -  10 Nov 2019 07:00  --------------------------------------------------------  IN: 0 mL / OUT: 2260 mL / NET: -2260 mL        PHYSICAL EXAM:  Vital Signs Last 24 Hrs  T(C): 36.8 (10 Nov 2019 09:59), Max: 37 (10 Nov 2019 06:09)  T(F): 98.3 (10 Nov 2019 09:59), Max: 98.6 (10 Nov 2019 06:09)  HR: 84 (10 Nov 2019 09:59) (73 - 92)  BP: 119/51 (10 Nov 2019 09:59) (118/47 - 131/60)  BP(mean): --  RR: 18 (10 Nov 2019 09:59) (16 - 18)  SpO2: 98% (10 Nov 2019 09:59) (94% - 100%)    CONSTITUTIONAL: NAD  EYES: PERRLA; conjunctiva and sclera clear  ENMT: Moist oral mucosa, no pharyngeal injection or exudates; normal dentition  NECK: Supple, no palpable masses; no thyromegaly  RESPIRATORY: Normal respiratory effort; lungs are clear to auscultation bilaterally  CARDIOVASCULAR: Regular rate and rhythm, normal S1 and S2, no murmur/rub/gallop; No lower extremity edema; Peripheral pulses are 2+ bilaterally  ABDOMEN: Nontender to palpation, normoactive bowel sounds, no rebound/guarding; No hepatosplenomegaly  MUSCLOSKELETAL:  Normal gait; no clubbing or cyanosis of digits; no joint swelling or tenderness to palpation  PSYCH: A+O to person, place, and time; affect appropriate  NEUROLOGY: CN 2-12 are intact and symmetric; no gross sensory deficits;   SKIN: No rashes; no palpable lesions    LABS:                        10.6   10.15 )-----------( 162      ( 10 Nov 2019 05:39 )             33.5     11-10    137  |  101  |  14  ----------------------------<  89  4.3   |  26  |  1.54<H>    Ca    8.9      10 Nov 2019 05:39                  RADIOLOGY & ADDITIONAL TESTS:  Results Reviewed:   Imaging Personally Reviewed:  Electrocardiogram Personally Reviewed:    COORDINATION OF CARE:  Care Discussed with Consultants/Other Providers [Y/N]:  Prior or Outpatient Records Reviewed [Y/N]:

## 2019-11-10 NOTE — PROGRESS NOTE ADULT - SUBJECTIVE AND OBJECTIVE BOX
Subjective  Pt seen and examined. No overnight events, no flatus yet, +oob/ambi, no f/c/n/v. pain controlled.     Objective    Vital signs  T(F): , Max: 98.6 (11-10-19 @ 06:09)  HR: 60 (11-10-19 @ 09:59)  BP: 150/56 (11-10-19 @ 09:59)  SpO2: 98% (11-10-19 @ 09:59)  Wt(kg): --    Output     OUT:    Indwelling Catheter - Urethral: 2260 mL  Total OUT: 2260 mL    Total NET: -2260 mL          Gen: NAD  Abd: S/NT/ND, incision c/d/i  : Grace in place, CYU    Labs      11-10 @ 05:39    WBC 10.15 / Hct 33.5  / SCr 1.54     11-09 @ 05:50    WBC 9.35  / Hct 34.6  / SCr 1.47

## 2019-11-11 DIAGNOSIS — D62 ACUTE POSTHEMORRHAGIC ANEMIA: ICD-10-CM

## 2019-11-11 LAB
ANION GAP SERPL CALC-SCNC: 10 MMO/L — SIGNIFICANT CHANGE UP (ref 7–14)
BUN SERPL-MCNC: 18 MG/DL — SIGNIFICANT CHANGE UP (ref 7–23)
CALCIUM SERPL-MCNC: 8.8 MG/DL — SIGNIFICANT CHANGE UP (ref 8.4–10.5)
CHLORIDE SERPL-SCNC: 101 MMOL/L — SIGNIFICANT CHANGE UP (ref 98–107)
CO2 SERPL-SCNC: 25 MMOL/L — SIGNIFICANT CHANGE UP (ref 22–31)
CREAT SERPL-MCNC: 1.36 MG/DL — HIGH (ref 0.5–1.3)
GLUCOSE SERPL-MCNC: 75 MG/DL — SIGNIFICANT CHANGE UP (ref 70–99)
HCT VFR BLD CALC: 30.7 % — LOW (ref 39–50)
HGB BLD-MCNC: 10.2 G/DL — LOW (ref 13–17)
MCHC RBC-ENTMCNC: 30.6 PG — SIGNIFICANT CHANGE UP (ref 27–34)
MCHC RBC-ENTMCNC: 33.2 % — SIGNIFICANT CHANGE UP (ref 32–36)
MCV RBC AUTO: 92.2 FL — SIGNIFICANT CHANGE UP (ref 80–100)
NRBC # FLD: 0 K/UL — SIGNIFICANT CHANGE UP (ref 0–0)
PLATELET # BLD AUTO: 147 K/UL — LOW (ref 150–400)
PMV BLD: 11.5 FL — SIGNIFICANT CHANGE UP (ref 7–13)
POTASSIUM SERPL-MCNC: 3.9 MMOL/L — SIGNIFICANT CHANGE UP (ref 3.5–5.3)
POTASSIUM SERPL-SCNC: 3.9 MMOL/L — SIGNIFICANT CHANGE UP (ref 3.5–5.3)
RBC # BLD: 3.33 M/UL — LOW (ref 4.2–5.8)
RBC # FLD: 13.6 % — SIGNIFICANT CHANGE UP (ref 10.3–14.5)
SODIUM SERPL-SCNC: 136 MMOL/L — SIGNIFICANT CHANGE UP (ref 135–145)
WBC # BLD: 10.76 K/UL — HIGH (ref 3.8–10.5)
WBC # FLD AUTO: 10.76 K/UL — HIGH (ref 3.8–10.5)

## 2019-11-11 PROCEDURE — 99232 SBSQ HOSP IP/OBS MODERATE 35: CPT

## 2019-11-11 RX ORDER — ACETAMINOPHEN 500 MG
975 TABLET ORAL EVERY 6 HOURS
Refills: 0 | Status: DISCONTINUED | OUTPATIENT
Start: 2019-11-11 | End: 2019-11-12

## 2019-11-11 RX ORDER — SODIUM CHLORIDE 9 MG/ML
1000 INJECTION, SOLUTION INTRAVENOUS
Refills: 0 | Status: DISCONTINUED | OUTPATIENT
Start: 2019-11-11 | End: 2019-11-12

## 2019-11-11 RX ADMIN — OXYCODONE HYDROCHLORIDE 5 MILLIGRAM(S): 5 TABLET ORAL at 11:00

## 2019-11-11 RX ADMIN — Medication 100 MILLIGRAM(S): at 12:29

## 2019-11-11 RX ADMIN — Medication 975 MILLIGRAM(S): at 19:04

## 2019-11-11 RX ADMIN — SODIUM CHLORIDE 75 MILLILITER(S): 9 INJECTION, SOLUTION INTRAVENOUS at 12:31

## 2019-11-11 RX ADMIN — HEPARIN SODIUM 5000 UNIT(S): 5000 INJECTION INTRAVENOUS; SUBCUTANEOUS at 05:19

## 2019-11-11 RX ADMIN — Medication 100 MILLIGRAM(S): at 04:00

## 2019-11-11 RX ADMIN — OXYCODONE HYDROCHLORIDE 5 MILLIGRAM(S): 5 TABLET ORAL at 10:00

## 2019-11-11 RX ADMIN — HEPARIN SODIUM 5000 UNIT(S): 5000 INJECTION INTRAVENOUS; SUBCUTANEOUS at 14:46

## 2019-11-11 RX ADMIN — OXYCODONE HYDROCHLORIDE 5 MILLIGRAM(S): 5 TABLET ORAL at 01:58

## 2019-11-11 RX ADMIN — OXYCODONE HYDROCHLORIDE 5 MILLIGRAM(S): 5 TABLET ORAL at 02:39

## 2019-11-11 RX ADMIN — Medication 100 MILLIGRAM(S): at 19:03

## 2019-11-11 RX ADMIN — Medication 975 MILLIGRAM(S): at 12:29

## 2019-11-11 RX ADMIN — HEPARIN SODIUM 5000 UNIT(S): 5000 INJECTION INTRAVENOUS; SUBCUTANEOUS at 21:12

## 2019-11-11 NOTE — PROGRESS NOTE ADULT - SUBJECTIVE AND OBJECTIVE BOX
POD #3    Afeb 146/60 83 95%RA    Pt has no c/o  Abd- soft NT ND; no flatus     incision C&D  Grace [phone]

## 2019-11-11 NOTE — PROGRESS NOTE ADULT - SUBJECTIVE AND OBJECTIVE BOX
PROGRESS NOTE:     Patient is a 74y old  Male who presents with a chief complaint of renal mass (08 Nov 2019 17:39)    SUBJECTIVE / OVERNIGHT EVENTS: Post-op pain improves w/ Oxycodone. No flatus yet. No n/v.     ADDITIONAL REVIEW OF SYSTEMS:    MEDICATIONS  (STANDING):  acetaminophen   Tablet .. 975 milliGRAM(s) Oral every 6 hours  ceFAZolin   IVPB      ceFAZolin   IVPB 2000 milliGRAM(s) IV Intermittent every 8 hours  dextrose 5% + sodium chloride 0.45%. 1000 milliLiter(s) (75 mL/Hr) IV Continuous <Continuous>  heparin  Injectable 5000 Unit(s) SubCutaneous every 8 hours    MEDICATIONS  (PRN):  naloxone Injectable 0.1 milliGRAM(s) IV Push every 3 minutes PRN For ANY of the following changes in patient status:  A. RR LESS THAN 10 breaths per minute, B. Oxygen saturation LESS THAN 90%, C. Sedation score of 6  ondansetron Injectable 4 milliGRAM(s) IV Push every 6 hours PRN Nausea  oxyCODONE    IR 10 milliGRAM(s) Oral every 6 hours PRN Severe Pain (7 - 10)  oxyCODONE    IR 5 milliGRAM(s) Oral every 4 hours PRN Moderate Pain (4 - 6)      CAPILLARY BLOOD GLUCOSE        I&O's Summary    10 Nov 2019 07:01  -  11 Nov 2019 07:00  --------------------------------------------------------  IN: 0 mL / OUT: 1325 mL / NET: -1325 mL    11 Nov 2019 07:01  -  11 Nov 2019 10:04  --------------------------------------------------------  IN: 0 mL / OUT: 200 mL / NET: -200 mL        PHYSICAL EXAM:  Vital Signs Last 24 Hrs  T(C): 36.8 (11 Nov 2019 09:57), Max: 37.6 (10 Nov 2019 17:32)  T(F): 98.3 (11 Nov 2019 09:57), Max: 99.6 (10 Nov 2019 17:32)  HR: 87 (11 Nov 2019 09:57) (83 - 847)  BP: 152/71 (11 Nov 2019 09:57) (137/58 - 156/70)  BP(mean): --  RR: 16 (11 Nov 2019 09:57) (15 - 18)  SpO2: 97% (11 Nov 2019 09:57) (95% - 97%)    CONSTITUTIONAL: NAD, well-developed  NECK: NO JVD, supple  RESPIRATORY: Normal respiratory effort; lungs are clear to auscultation bilaterally  CARDIOVASCULAR: Regular rate and rhythm, normal S1 and S2, no murmur/rub/gallop; No lower extremity edema; Peripheral pulses are 2+ bilaterally  ABDOMEN: Soft, Nontender to palpation, normoactive bowel sounds, no rebound/guarding; No hepatosplenomegaly  MUSCLOSKELETAL: no clubbing or cyanosis of digits; no joint swelling or tenderness to palpation  PSYCH: A+O to person, place, and time; affect appropriate    LABS:                        10.2   10.76 )-----------( 147      ( 11 Nov 2019 05:25 )             30.7     11-11    136  |  101  |  18  ----------------------------<  75  3.9   |  25  |  1.36<H>    Ca    8.8      11 Nov 2019 05:25                  RADIOLOGY & ADDITIONAL TESTS:  Results Reviewed:   Imaging Personally Reviewed:  Electrocardiogram Personally Reviewed:    COORDINATION OF CARE:  Care Discussed with Consultants/Other Providers [Y/N]:  Prior or Outpatient Records Reviewed [Y/N]:

## 2019-11-12 ENCOUNTER — TRANSCRIPTION ENCOUNTER (OUTPATIENT)
Age: 74
End: 2019-11-12

## 2019-11-12 VITALS
OXYGEN SATURATION: 97 % | SYSTOLIC BLOOD PRESSURE: 134 MMHG | RESPIRATION RATE: 17 BRPM | TEMPERATURE: 98 F | DIASTOLIC BLOOD PRESSURE: 67 MMHG | HEART RATE: 81 BPM

## 2019-11-12 PROBLEM — Z86.79 PERSONAL HISTORY OF OTHER DISEASES OF THE CIRCULATORY SYSTEM: Chronic | Status: ACTIVE | Noted: 2019-10-31

## 2019-11-12 PROBLEM — C67.9 MALIGNANT NEOPLASM OF BLADDER, UNSPECIFIED: Chronic | Status: ACTIVE | Noted: 2019-10-31

## 2019-11-12 LAB
ANION GAP SERPL CALC-SCNC: 6 MMO/L — LOW (ref 7–14)
BUN SERPL-MCNC: 12 MG/DL — SIGNIFICANT CHANGE UP (ref 7–23)
CALCIUM SERPL-MCNC: 8.5 MG/DL — SIGNIFICANT CHANGE UP (ref 8.4–10.5)
CHLORIDE SERPL-SCNC: 102 MMOL/L — SIGNIFICANT CHANGE UP (ref 98–107)
CO2 SERPL-SCNC: 27 MMOL/L — SIGNIFICANT CHANGE UP (ref 22–31)
CREAT SERPL-MCNC: 1.32 MG/DL — HIGH (ref 0.5–1.3)
GLUCOSE SERPL-MCNC: 123 MG/DL — HIGH (ref 70–99)
HCT VFR BLD CALC: 29.7 % — LOW (ref 39–50)
HGB BLD-MCNC: 9.6 G/DL — LOW (ref 13–17)
MCHC RBC-ENTMCNC: 29.3 PG — SIGNIFICANT CHANGE UP (ref 27–34)
MCHC RBC-ENTMCNC: 32.3 % — SIGNIFICANT CHANGE UP (ref 32–36)
MCV RBC AUTO: 90.5 FL — SIGNIFICANT CHANGE UP (ref 80–100)
NRBC # FLD: 0 K/UL — SIGNIFICANT CHANGE UP (ref 0–0)
PLATELET # BLD AUTO: 175 K/UL — SIGNIFICANT CHANGE UP (ref 150–400)
PMV BLD: 10.6 FL — SIGNIFICANT CHANGE UP (ref 7–13)
POTASSIUM SERPL-MCNC: 3.7 MMOL/L — SIGNIFICANT CHANGE UP (ref 3.5–5.3)
POTASSIUM SERPL-SCNC: 3.7 MMOL/L — SIGNIFICANT CHANGE UP (ref 3.5–5.3)
RBC # BLD: 3.28 M/UL — LOW (ref 4.2–5.8)
RBC # FLD: 13.5 % — SIGNIFICANT CHANGE UP (ref 10.3–14.5)
SODIUM SERPL-SCNC: 135 MMOL/L — SIGNIFICANT CHANGE UP (ref 135–145)
WBC # BLD: 7.48 K/UL — SIGNIFICANT CHANGE UP (ref 3.8–10.5)
WBC # FLD AUTO: 7.48 K/UL — SIGNIFICANT CHANGE UP (ref 3.8–10.5)

## 2019-11-12 PROCEDURE — 99238 HOSP IP/OBS DSCHRG MGMT 30/<: CPT

## 2019-11-12 PROCEDURE — 99232 SBSQ HOSP IP/OBS MODERATE 35: CPT

## 2019-11-12 RX ORDER — CEPHALEXIN 500 MG
1 CAPSULE ORAL
Qty: 10 | Refills: 0
Start: 2019-11-12 | End: 2019-11-16

## 2019-11-12 RX ORDER — ACETAMINOPHEN 500 MG
3 TABLET ORAL
Qty: 0 | Refills: 0 | DISCHARGE
Start: 2019-11-12

## 2019-11-12 RX ORDER — ASPIRIN/CALCIUM CARB/MAGNESIUM 324 MG
1 TABLET ORAL
Qty: 0 | Refills: 0 | DISCHARGE

## 2019-11-12 RX ORDER — CLOPIDOGREL BISULFATE 75 MG/1
1 TABLET, FILM COATED ORAL
Qty: 0 | Refills: 0 | DISCHARGE

## 2019-11-12 RX ADMIN — Medication 100 MILLIGRAM(S): at 02:55

## 2019-11-12 RX ADMIN — Medication 975 MILLIGRAM(S): at 01:08

## 2019-11-12 RX ADMIN — OXYCODONE HYDROCHLORIDE 5 MILLIGRAM(S): 5 TABLET ORAL at 01:08

## 2019-11-12 RX ADMIN — HEPARIN SODIUM 5000 UNIT(S): 5000 INJECTION INTRAVENOUS; SUBCUTANEOUS at 05:42

## 2019-11-12 RX ADMIN — Medication 975 MILLIGRAM(S): at 05:42

## 2019-11-12 RX ADMIN — OXYCODONE HYDROCHLORIDE 5 MILLIGRAM(S): 5 TABLET ORAL at 02:00

## 2019-11-12 RX ADMIN — Medication 975 MILLIGRAM(S): at 11:20

## 2019-11-12 RX ADMIN — Medication 100 MILLIGRAM(S): at 11:20

## 2019-11-12 NOTE — PROGRESS NOTE ADULT - ASSESSMENT
73 y/o M s/p left open Nephroureterectomy 11/8. POD#4 doing well; passed gas    Plan  - reg diet  - check labs
74 year old male male with past medical history of hemochromatosis on bi-annual phlebotomies (next due in December 2019), HTN, HLD, Bladder Cancer, TIA and found to have carotid artery stenosis s/p LICA stent 6/3/19, recently diagnosed w/ L renal lesion, admitted for open left partial nephrectomy.
75 y/o M s/p left open Nephroureterectomy 11/8.    Plan  -am labs reviewed  -ancef  -oob/ambi/pain control/ PCA  -NPO  -check GIF
75 y/o M s/p left open Nephroureterectomy 11/8. POD#3 doing well; no flatus yet    Plan  - clears  - check labs  - d/c french  - change IV
S/P open L nephroureterectomy POD 1
74M s/p open L neph-u

## 2019-11-12 NOTE — PROGRESS NOTE ADULT - PROBLEM SELECTOR PROBLEM 1
Malignant neoplasm of bladder, unspecified
Neoplasm of kidney

## 2019-11-12 NOTE — PROGRESS NOTE ADULT - SUBJECTIVE AND OBJECTIVE BOX
PROGRESS NOTE:     Patient is a 74y old  Male who presents with a chief complaint of renal mass (08 Nov 2019 17:39)      SUBJECTIVE / OVERNIGHT EVENTS: Pain is controlled. +Flatus.     ADDITIONAL REVIEW OF SYSTEMS:    MEDICATIONS  (STANDING):  acetaminophen   Tablet .. 975 milliGRAM(s) Oral every 6 hours  ceFAZolin   IVPB      ceFAZolin   IVPB 2000 milliGRAM(s) IV Intermittent every 8 hours  dextrose 5% + sodium chloride 0.45%. 1000 milliLiter(s) (75 mL/Hr) IV Continuous <Continuous>  heparin  Injectable 5000 Unit(s) SubCutaneous every 8 hours    MEDICATIONS  (PRN):  oxyCODONE    IR 10 milliGRAM(s) Oral every 6 hours PRN Severe Pain (7 - 10)  oxyCODONE    IR 5 milliGRAM(s) Oral every 4 hours PRN Moderate Pain (4 - 6)      CAPILLARY BLOOD GLUCOSE        I&O's Summary    11 Nov 2019 07:01  -  12 Nov 2019 07:00  --------------------------------------------------------  IN: 0 mL / OUT: 1515 mL / NET: -1515 mL        PHYSICAL EXAM:  Vital Signs Last 24 Hrs  T(C): 36.8 (12 Nov 2019 10:05), Max: 37.3 (11 Nov 2019 13:42)  T(F): 98.3 (12 Nov 2019 10:05), Max: 99.2 (11 Nov 2019 13:42)  HR: 81 (12 Nov 2019 10:05) (68 - 81)  BP: 134/67 (12 Nov 2019 10:05) (111/47 - 149/66)  BP(mean): --  RR: 17 (12 Nov 2019 10:05) (16 - 18)  SpO2: 97% (12 Nov 2019 10:05) (96% - 100%)    CONSTITUTIONAL: NAD, well-developed  NECK: NO JVD, supple  RESPIRATORY: Normal respiratory effort; lungs are clear to auscultation bilaterally  CARDIOVASCULAR: Regular rate and rhythm, normal S1 and S2, no murmur/rub/gallop; No lower extremity edema; Peripheral pulses are 2+ bilaterally  ABDOMEN: Soft, Nontender to palpation, normoactive bowel sounds, no rebound/guarding; No hepatosplenomegaly  MUSCLOSKELETAL: no clubbing or cyanosis of digits; no joint swelling or tenderness to palpation  PSYCH: A+O to person, place, and time; affect appropriate    LABS:                        9.6    7.48  )-----------( 175      ( 12 Nov 2019 06:06 )             29.7     11-12    135  |  102  |  12  ----------------------------<  123<H>  3.7   |  27  |  1.32<H>    Ca    8.5      12 Nov 2019 06:06                  RADIOLOGY & ADDITIONAL TESTS:  Results Reviewed:   Imaging Personally Reviewed:  Electrocardiogram Personally Reviewed:    COORDINATION OF CARE:  Care Discussed with Consultants/Other Providers [Y/N]:  Prior or Outpatient Records Reviewed [Y/N]:

## 2019-11-12 NOTE — DISCHARGE NOTE PROVIDER - NSDCMRMEDTOKEN_GEN_ALL_CORE_FT
acetaminophen 325 mg oral tablet: 3 tab(s) orally every 6 hours  amLODIPine 5 mg oral tablet: 1 tab(s) orally once a day  atorvastatin 80 mg oral tablet: 1 tab(s) orally once a day  CoQ10: 200 milligram(s) orally once a day LD 11/1/19  Keflex 500 mg oral capsule: 1 cap(s) orally every 12 hours   tamsulosin 0.4 mg oral capsule: 1 cap(s) orally once a day  valsartan-hydrochlorothiazide 80mg-12.5mg oral tablet: 1 tab(s) orally once a day

## 2019-11-12 NOTE — DISCHARGE NOTE NURSING/CASE MANAGEMENT/SOCIAL WORK - PATIENT PORTAL LINK FT
You can access the FollowMyHealth Patient Portal offered by [facility] by registering at the following website: [email or web address]. By joining [employer]’s FollowMyHealth portal, you will also be able to view your health information using other applications (apps) compatible with our system.

## 2019-11-12 NOTE — PROGRESS NOTE ADULT - SUBJECTIVE AND OBJECTIVE BOX
POD #4  Afeb 147/72 95 94%  Pt has no c/o  Abd- soft NT ND; + flatus     incision lines C&D  Void 475

## 2019-11-12 NOTE — DISCHARGE NOTE PROVIDER - NSDCACTIVITY_GEN_ALL_CORE
Showering allowed/Stairs allowed/No heavy lifting/straining/Walking - Indoors allowed/Walking - Outdoors allowed

## 2019-11-12 NOTE — DISCHARGE NOTE NURSING/CASE MANAGEMENT/SOCIAL WORK - NSDPDISTO_GEN_ALL_CORE
Home/Pt. is afebrile and offers no complaints. In no acute distress. Transverse abdominal steri strips: clean, dry and intact. Pt is ambulating ad awais, tolerating diet well, and voiding in adequate amounts.

## 2019-11-12 NOTE — DISCHARGE NOTE PROVIDER - HOSPITAL COURSE
Pt had open L. nephro-ureterectomy 4 days ago; did well; ambulating; sharon clears, then passed gas yesterday; labs stable; sharon reg diet today; voiding well; home today on keflex

## 2019-11-12 NOTE — DISCHARGE NOTE PROVIDER - NSDCFUSCHEDAPPT_GEN_ALL_CORE_FT
NAMRATALURDES ; 12/18/2019 ; [facility] [address] [place] [address]  NAMRATALURDES ; 12/18/2019 ; [facility] [address] [place] [address]

## 2019-11-12 NOTE — DISCHARGE NOTE PROVIDER - CARE PROVIDER_API CALL
Gordo Mann)  Urology  [address] [place] [address], [place] [address]  Phone: [phone]  Fax: [phone]  Follow Up Time:

## 2019-11-12 NOTE — DISCHARGE NOTE PROVIDER - NSDCCPCAREPLAN_GEN_ALL_CORE_FT
PRINCIPAL DISCHARGE DIAGNOSIS  Diagnosis: Neoplasm of kidney  Assessment and Plan of Treatment: Drink plenty of fluids.  No heavy lifting (greater than 10 pounds) or straining for 4 to 6 weeks.  You may shower, just pat white strips dry, they will fall off in a few weeks.   Do not take aspirin or plavix until you speak with Dr. Mann;  he will call you to schedule a follow up appointment.  Call the office if you have fever greater than 101, difficulty urinating, pain not relieved with pain medication, nausea/vomiting..

## 2019-11-12 NOTE — DISCHARGE NOTE NURSING/CASE MANAGEMENT/SOCIAL WORK - NSDCPNINST_GEN_ALL_CORE
Make a follow up appointment with Dr. Mann. Call MD if you develop a fever, or if there is redness, swelling, drainage or pain not relieved by pain medication. No heavy lifting, bending, or straining to move your bowels. Take over the counter stool softeners as needed to prevent constipation which may be caused by pain medication. Drink plenty of liquids.

## 2019-11-12 NOTE — PROGRESS NOTE ADULT - PROBLEM SELECTOR PLAN 1
1. Follow up labs  2. OOB  3. follow GI function, NPO   4. pain control   5. Check UOP
s/p open L partial nephrectomy   Cr slightly elevated, . cont to monitor   pain control w/ Dilaudid PCA pump  Prophylactic Cefazolin   monitor GI function   Incentive spirometry.
s/p open L partial nephrectomy   Cr slightly elevated, expected post surgery. cont to monitor   pain control w/ Dilaudid PCA pump  Prophylactic Cefazolin   monitor GI function   Incentive spirometry.
s/p open L partial nephrectomy   s/p Dilaudid PCA pump, now on Oxycodone IR   Prophylactic Cefazolin   Tolerating PO  Incentive spirometry.
s/p open L partial nephrectomy   s/p Dilaudid PCA pump, now on Oxycodone IR   Prophylactic Cefazolin   monitor GI function   Incentive spirometry.
Strict I&O's  Analgesia   Antiemetics  DVT prophylaxis  Incentive spirometry  NPO / OOB  AM labs

## 2019-11-13 LAB — SURGICAL PATHOLOGY STUDY: SIGNIFICANT CHANGE UP

## 2019-11-18 ENCOUNTER — APPOINTMENT (OUTPATIENT)
Dept: UROLOGY | Facility: CLINIC | Age: 74
End: 2019-11-18
Payer: MEDICARE

## 2019-11-18 PROCEDURE — 99024 POSTOP FOLLOW-UP VISIT: CPT

## 2019-11-27 ENCOUNTER — APPOINTMENT (OUTPATIENT)
Dept: INTERNAL MEDICINE | Facility: CLINIC | Age: 74
End: 2019-11-27
Payer: MEDICARE

## 2019-11-27 VITALS
HEIGHT: 68 IN | OXYGEN SATURATION: 98 % | TEMPERATURE: 98.2 F | BODY MASS INDEX: 28.19 KG/M2 | HEART RATE: 102 BPM | WEIGHT: 186 LBS

## 2019-11-27 VITALS — SYSTOLIC BLOOD PRESSURE: 140 MMHG | DIASTOLIC BLOOD PRESSURE: 80 MMHG

## 2019-11-27 PROCEDURE — 99214 OFFICE O/P EST MOD 30 MIN: CPT

## 2019-11-27 NOTE — PLAN
[FreeTextEntry1] : Blood drawn for routine labs, checked iron \par Recommend OTC cortisone cream\par

## 2019-11-27 NOTE — PATIENT PROFILE ADULT - ANY SIGNIFICANT CHANGE IN ABILITY TO PERFORM THE FOLLOWING ADL SINCE THE ONSET OF PRESENT ILLNESS?
----- Message from Jackie Messer RN sent at 11/27/2019  7:57 AM EST -----  Regarding: FW: Test Results Question  Contact: [phone]      ----- Message -----  From: Vahid Carrizales  Sent: 11/26/2019  11:22 PM EST  To: [facility]  Subject: Test Results Question                            I had a ct scan with dye on nov 22  I don[facility] see [facility] results [facility]  [facility]  How long do the results take?   Thank you  Vahid Carrizales
Called and spoke to patient   Made her aware that results should be finalized by next week and that they will be reviewed with her at her appointment on 12/11/19
Results should be finalized by next week
no

## 2019-11-27 NOTE — REVIEW OF SYSTEMS
[Negative] : Heme/Lymph [Hematuria] : no hematuria [FreeTextEntry8] : intermittent episodes of hematuria

## 2019-11-27 NOTE — ASSESSMENT
[FreeTextEntry1] : Mr. Rossi is a 75YO M with a PMH of bladder cancer s/p L nephroureterectomy on 11/8/19 presents for c/o of itching on back of hands. \par pt states the itching keeps him up at night sometimes. He has not tried anything for it. \par Pt takes medications as prescribed. Pt states he has been taking tylenol often after surgery and thinks it might be from that. \par Denies rash, redness, trauma, numbness/tingling, denies frequent hand washing or new habits. \par

## 2019-11-27 NOTE — HEALTH RISK ASSESSMENT
[No] : In the past 12 months have you used drugs other than those required for medical reasons? No [No falls in past year] : Patient reported no falls in the past year [0] : 2) Feeling down, depressed, or hopeless: Not at all (0) [] : No [de-identified] : none [Audit-CScore] : 0 [de-identified] : walk [de-identified] : normal [ZCO1Relmu] : 0

## 2019-11-27 NOTE — HISTORY OF PRESENT ILLNESS
[FreeTextEntry1] : Itching on back of hands [de-identified] : Mr. Rossi is a 73YO M with a PMH of bladder cancer s/p L nephroureterectomy on 11/8/19 presents for c/o of itching on back of hands. \par pt states the itching keeps him up at night sometimes. He has not tried anything for it. \par Pt takes medications as prescribed. Pt states he has been taking tylenol often after surgery and thinks it might be from that. \par Denies rash, redness, trauma, numbness/tingling, denies frequent hand washing or new habits. \par

## 2019-11-27 NOTE — PHYSICAL EXAM
[No Acute Distress] : no acute distress [Normal Rate] : normal rate  [No Focal Deficits] : no focal deficits [Alert and Oriented x3] : oriented to person, place, and time [de-identified] : no rash on either hand

## 2019-11-29 LAB
ALBUMIN SERPL ELPH-MCNC: 4 G/DL
ALP BLD-CCNC: 359 U/L
ALT SERPL-CCNC: 39 U/L
ANION GAP SERPL CALC-SCNC: 16 MMOL/L
AST SERPL-CCNC: 38 U/L
BASOPHILS # BLD AUTO: 0.05 K/UL
BASOPHILS NFR BLD AUTO: 0.6 %
BILIRUB SERPL-MCNC: 0.4 MG/DL
BUN SERPL-MCNC: 22 MG/DL
CALCIUM SERPL-MCNC: 9.6 MG/DL
CHLORIDE SERPL-SCNC: 99 MMOL/L
CHOLEST SERPL-MCNC: 142 MG/DL
CHOLEST/HDLC SERPL: 3.6 RATIO
CO2 SERPL-SCNC: 22 MMOL/L
CREAT SERPL-MCNC: 1.51 MG/DL
EOSINOPHIL # BLD AUTO: 0.67 K/UL
EOSINOPHIL NFR BLD AUTO: 7.7 %
ESTIMATED AVERAGE GLUCOSE: 120 MG/DL
GLUCOSE SERPL-MCNC: 81 MG/DL
HBA1C MFR BLD HPLC: 5.8 %
HCT VFR BLD CALC: 35.1 %
HDLC SERPL-MCNC: 39 MG/DL
HGB BLD-MCNC: 11.1 G/DL
IMM GRANULOCYTES NFR BLD AUTO: 0.2 %
IRON SATN MFR SERPL: 25 %
IRON SERPL-MCNC: 80 UG/DL
LDLC SERPL CALC-MCNC: 78 MG/DL
LYMPHOCYTES # BLD AUTO: 3.01 K/UL
LYMPHOCYTES NFR BLD AUTO: 34.8 %
MAN DIFF?: NORMAL
MCHC RBC-ENTMCNC: 29.5 PG
MCHC RBC-ENTMCNC: 31.6 GM/DL
MCV RBC AUTO: 93.4 FL
MONOCYTES # BLD AUTO: 0.74 K/UL
MONOCYTES NFR BLD AUTO: 8.6 %
NEUTROPHILS # BLD AUTO: 4.16 K/UL
NEUTROPHILS NFR BLD AUTO: 48.1 %
PLATELET # BLD AUTO: 541 K/UL
POTASSIUM SERPL-SCNC: 4.7 MMOL/L
PROT SERPL-MCNC: 7.1 G/DL
RBC # BLD: 3.76 M/UL
RBC # FLD: 13.9 %
SODIUM SERPL-SCNC: 137 MMOL/L
TIBC SERPL-MCNC: 324 UG/DL
TRIGL SERPL-MCNC: 125 MG/DL
TSH SERPL-ACNC: 2.05 UIU/ML
UIBC SERPL-MCNC: 244 UG/DL
WBC # FLD AUTO: 8.65 K/UL

## 2019-12-12 ENCOUNTER — APPOINTMENT (OUTPATIENT)
Dept: INTERNAL MEDICINE | Facility: CLINIC | Age: 74
End: 2019-12-12
Payer: MEDICARE

## 2019-12-12 VITALS
WEIGHT: 185 LBS | BODY MASS INDEX: 28.04 KG/M2 | OXYGEN SATURATION: 97 % | HEART RATE: 87 BPM | HEIGHT: 68 IN | TEMPERATURE: 97.9 F

## 2019-12-12 VITALS — SYSTOLIC BLOOD PRESSURE: 140 MMHG | DIASTOLIC BLOOD PRESSURE: 80 MMHG

## 2019-12-12 DIAGNOSIS — R05 COUGH: ICD-10-CM

## 2019-12-12 PROCEDURE — 99214 OFFICE O/P EST MOD 30 MIN: CPT

## 2019-12-12 NOTE — HISTORY OF PRESENT ILLNESS
[FreeTextEntry1] : Cough x 3 days [de-identified] : Mr. Rossi is a 75yo Mwith multiple comorbidities and a previous smoker who presents for 3 day history of productive cough, worse at night. \par Pt tried tried nyquil and dayquil with minimal improvement. \par Pt denies SOB, fever, body aches, chills, night sweats, sore throat, n/v/d/c, recent travel or sick contacts.

## 2019-12-12 NOTE — PHYSICAL EXAM
[No Acute Distress] : no acute distress [Normal Sclera/Conjunctiva] : normal sclera/conjunctiva [Well-Appearing] : well-appearing [Normal Outer Ear/Nose] : the outer ears and nose were normal in appearance [No JVD] : no jugular venous distention [Normal Oropharynx] : the oropharynx was normal [No Lymphadenopathy] : no lymphadenopathy [No Accessory Muscle Use] : no accessory muscle use [Clear to Auscultation] : lungs were clear to auscultation bilaterally [No Respiratory Distress] : no respiratory distress  [Normal Rate] : normal rate  [Regular Rhythm] : with a regular rhythm [Normal S1, S2] : normal S1 and S2 [No Murmur] : no murmur heard [No Extremity Clubbing/Cyanosis] : no extremity clubbing/cyanosis [Soft] : abdomen soft [Non Tender] : non-tender [Non-distended] : non-distended [Normal Bowel Sounds] : normal bowel sounds [No Rash] : no rash [Normal Insight/Judgement] : insight and judgment were intact [Alert and Oriented x3] : oriented to person, place, and time [Normal Affect] : the affect was normal [de-identified] : no rash on either hand

## 2019-12-12 NOTE — ASSESSMENT
[FreeTextEntry1] : Mr. Rossi is a 75yo M who presents for 3 day history of productive cough, worse at night. \par Pt tried tried nyquil and dayquil with minimal improvement. \par Pt denies SOB, fever, body aches, chills, night sweats, sore throat, n/v/d/c, recent travel or sick contacts. \par Lungs clear bilaterally

## 2019-12-12 NOTE — HEALTH RISK ASSESSMENT
[No falls in past year] : Patient reported no falls in the past year [No] : In the past 12 months have you used drugs other than those required for medical reasons? 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

## 2019-12-18 ENCOUNTER — APPOINTMENT (OUTPATIENT)
Dept: UROLOGY | Facility: CLINIC | Age: 74
End: 2019-12-18

## 2020-01-10 ENCOUNTER — RX RENEWAL (OUTPATIENT)
Age: 75
End: 2020-01-10

## 2020-02-12 ENCOUNTER — RX RENEWAL (OUTPATIENT)
Age: 75
End: 2020-02-12

## 2020-02-24 ENCOUNTER — APPOINTMENT (OUTPATIENT)
Dept: CARDIOLOGY | Facility: CLINIC | Age: 75
End: 2020-02-24
Payer: MEDICARE

## 2020-02-24 ENCOUNTER — NON-APPOINTMENT (OUTPATIENT)
Age: 75
End: 2020-02-24

## 2020-02-24 ENCOUNTER — OUTPATIENT (OUTPATIENT)
Dept: OUTPATIENT SERVICES | Facility: HOSPITAL | Age: 75
LOS: 1 days | End: 2020-02-24
Payer: MEDICARE

## 2020-02-24 VITALS
OXYGEN SATURATION: 96 % | HEIGHT: 68 IN | DIASTOLIC BLOOD PRESSURE: 68 MMHG | SYSTOLIC BLOOD PRESSURE: 138 MMHG | RESPIRATION RATE: 12 BRPM | BODY MASS INDEX: 27.74 KG/M2 | WEIGHT: 183 LBS | HEART RATE: 82 BPM

## 2020-02-24 VITALS
SYSTOLIC BLOOD PRESSURE: 136 MMHG | RESPIRATION RATE: 16 BRPM | OXYGEN SATURATION: 98 % | WEIGHT: 182.98 LBS | HEIGHT: 67 IN | TEMPERATURE: 97 F | HEART RATE: 70 BPM | DIASTOLIC BLOOD PRESSURE: 74 MMHG

## 2020-02-24 DIAGNOSIS — C67.9 MALIGNANT NEOPLASM OF BLADDER, UNSPECIFIED: ICD-10-CM

## 2020-02-24 DIAGNOSIS — I10 ESSENTIAL (PRIMARY) HYPERTENSION: ICD-10-CM

## 2020-02-24 DIAGNOSIS — Z86.79 PERSONAL HISTORY OF OTHER DISEASES OF THE CIRCULATORY SYSTEM: Chronic | ICD-10-CM

## 2020-02-24 DIAGNOSIS — Z98.49 CATARACT EXTRACTION STATUS, UNSPECIFIED EYE: Chronic | ICD-10-CM

## 2020-02-24 DIAGNOSIS — Z96.651 PRESENCE OF RIGHT ARTIFICIAL KNEE JOINT: Chronic | ICD-10-CM

## 2020-02-24 DIAGNOSIS — Z90.5 ACQUIRED ABSENCE OF KIDNEY: Chronic | ICD-10-CM

## 2020-02-24 DIAGNOSIS — Z98.62 PERIPHERAL VASCULAR ANGIOPLASTY STATUS: ICD-10-CM

## 2020-02-24 DIAGNOSIS — Z96.652 PRESENCE OF LEFT ARTIFICIAL KNEE JOINT: Chronic | ICD-10-CM

## 2020-02-24 LAB
ALBUMIN SERPL ELPH-MCNC: 4.3 G/DL — SIGNIFICANT CHANGE UP (ref 3.3–5)
ALP SERPL-CCNC: 153 U/L — HIGH (ref 40–120)
ALT FLD-CCNC: 20 U/L — SIGNIFICANT CHANGE UP (ref 4–41)
ANION GAP SERPL CALC-SCNC: 13 MMO/L — SIGNIFICANT CHANGE UP (ref 7–14)
AST SERPL-CCNC: 23 U/L — SIGNIFICANT CHANGE UP (ref 4–40)
BILIRUB SERPL-MCNC: 0.3 MG/DL — SIGNIFICANT CHANGE UP (ref 0.2–1.2)
BUN SERPL-MCNC: 24 MG/DL — HIGH (ref 7–23)
CALCIUM SERPL-MCNC: 10.1 MG/DL — SIGNIFICANT CHANGE UP (ref 8.4–10.5)
CHLORIDE SERPL-SCNC: 100 MMOL/L — SIGNIFICANT CHANGE UP (ref 98–107)
CO2 SERPL-SCNC: 25 MMOL/L — SIGNIFICANT CHANGE UP (ref 22–31)
CREAT SERPL-MCNC: 1.46 MG/DL — HIGH (ref 0.5–1.3)
GLUCOSE SERPL-MCNC: 91 MG/DL — SIGNIFICANT CHANGE UP (ref 70–99)
HCT VFR BLD CALC: 38.9 % — LOW (ref 39–50)
HGB BLD-MCNC: 12.3 G/DL — LOW (ref 13–17)
MCHC RBC-ENTMCNC: 29.9 PG — SIGNIFICANT CHANGE UP (ref 27–34)
MCHC RBC-ENTMCNC: 31.6 % — LOW (ref 32–36)
MCV RBC AUTO: 94.4 FL — SIGNIFICANT CHANGE UP (ref 80–100)
NRBC # FLD: 0 K/UL — SIGNIFICANT CHANGE UP (ref 0–0)
PLATELET # BLD AUTO: 239 K/UL — SIGNIFICANT CHANGE UP (ref 150–400)
PMV BLD: 10.5 FL — SIGNIFICANT CHANGE UP (ref 7–13)
POTASSIUM SERPL-MCNC: 4.1 MMOL/L — SIGNIFICANT CHANGE UP (ref 3.5–5.3)
POTASSIUM SERPL-SCNC: 4.1 MMOL/L — SIGNIFICANT CHANGE UP (ref 3.5–5.3)
PROT SERPL-MCNC: 7.3 G/DL — SIGNIFICANT CHANGE UP (ref 6–8.3)
RBC # BLD: 4.12 M/UL — LOW (ref 4.2–5.8)
RBC # FLD: 13.4 % — SIGNIFICANT CHANGE UP (ref 10.3–14.5)
SODIUM SERPL-SCNC: 138 MMOL/L — SIGNIFICANT CHANGE UP (ref 135–145)
WBC # BLD: 7.54 K/UL — SIGNIFICANT CHANGE UP (ref 3.8–10.5)
WBC # FLD AUTO: 7.54 K/UL — SIGNIFICANT CHANGE UP (ref 3.8–10.5)

## 2020-02-24 PROCEDURE — 99215 OFFICE O/P EST HI 40 MIN: CPT

## 2020-02-24 PROCEDURE — 93010 ELECTROCARDIOGRAM REPORT: CPT

## 2020-02-24 RX ORDER — UBIDECARENONE 100 MG
200 CAPSULE ORAL
Qty: 0 | Refills: 0 | DISCHARGE

## 2020-02-24 RX ORDER — AMLODIPINE BESYLATE 2.5 MG/1
1 TABLET ORAL
Qty: 0 | Refills: 0 | DISCHARGE

## 2020-02-24 RX ORDER — ATORVASTATIN CALCIUM 80 MG/1
1 TABLET, FILM COATED ORAL
Qty: 0 | Refills: 0 | DISCHARGE

## 2020-02-24 NOTE — REVIEW OF SYSTEMS
[Dyspnea on exertion] : not dyspnea during exertion [Shortness Of Breath] : no shortness of breath [Palpitations] : no palpitations [Chest Pain] : no chest pain [Lower Ext Edema] : no extremity edema [Negative] : Heme/Lymph

## 2020-02-24 NOTE — H&P PST ADULT - NEGATIVE MUSCULOSKELETAL SYMPTOMS
no joint swelling/no leg pain L/no myalgia/no back pain/no muscle cramps/no stiffness/no muscle weakness/no neck pain/no arm pain L/no arm pain R/no leg pain R

## 2020-02-24 NOTE — H&P PST ADULT - NSICDXPROBLEM_GEN_ALL_CORE_FT
PROBLEM DIAGNOSES  Problem: Malignant neoplasm of bladder, unspecified  Assessment and Plan: Patient is scheduled for Left partial nephrectomy scheduled on 11/8/2019.   Preop instructions, pepcid, surgical scrub provided. Pt stated understanding. Teach back method utilized.   Pending cardiology evalaution per surgeon and PST, history of Hypertension, History of Carotid artery angioplasty and stenting 6/2019-- placed on Asprin 325mg and Plavix - managed by cardiologist per patient.   Plavix and Asprin plan- Instructed patient to confirm Asprin 325mg  and Plavix plan with cardiologist and confirm plan with Dr. Mann.   Per Dr. Mann's note Patient to hold Plavix 5 days prior to surgery, Notified Patient's cardiologist.   Awaiting written instructions from    Patient verbalzied understanding.   Pending last Neurology note- per PST- history of stroke.   Pending last Hematology note -per PST- patient with a history of Hemachromatosis.       Problem: Snoring  Assessment and Plan: ALEX precautions, OR booking notified.      Problem: Hypertension  Assessment and Plan: Patient instructed to take Valsartan/Hydrochlorathiazide in the AM of surgery with a sip of water. PROBLEM DIAGNOSES  Problem: Malignant neoplasm of bladder, unspecified  Assessment and Plan: cystoscopy, possible bladder biopsy on 3/2/2020  Written & verbal preop instructions, gi prophylaxis - pt to take own.  Pt verbalized good understanding.      Problem: H/O carotid angioplasty  Assessment and Plan: with stent insertion 5/2019  pt on plavix and aspirin  Per pt last dose plavix was 2/23/2020 and continuing aspirin  pt have appointment with cardiologist at 4 pm today.  Pt to confirm   pending copy of cardiology eval & most recent echo & stress test report     Problem: Hypertension  Assessment and Plan: pt instructed to take valsartan on morning of surgery  ALEX precautions recommended.  OR booking notified via fax. precautions recommended.  OR booking notified via fax. PROBLEM DIAGNOSES  Problem: Malignant neoplasm of bladder, unspecified  Assessment and Plan: cystoscopy, possible bladder biopsy on 3/2/2020  Written & verbal preop instructions, gi prophylaxis - pt to take own.  Pt verbalized good understanding.      Problem: H/O carotid angioplasty  Assessment and Plan: with stent insertion 5/2019  pt on plavix and aspirin  Per pt last dose plavix was 2/23/2020  copy of cardiology eval note in chart, per note pt to  continue on 81 mg aspirin  copy of comparison done copy in chart,  & echo report in chart     Problem: Hypertension  Assessment and Plan: pt instructed to take valsartan on morning of surgery  ALEX precautions recommended.  OR booking notified via fax. precautions recommended.  OR booking notified via fax.

## 2020-02-24 NOTE — H&P PST ADULT - NEGATIVE NEUROLOGICAL SYMPTOMS
no generalized seizures/no headache/no confusion/no tremors/no paresthesias/no vertigo/no loss of sensation/no difficulty walking/no focal seizures/no syncope/no transient paralysis/no weakness

## 2020-02-24 NOTE — H&P PST ADULT - ASSESSMENT
Pre op diagnosis: Malignant neoplasm of bladder, unspecified. Patient is scheduled for Left partial nephrectomy scheduled on 11/8/2019.     History of Carotid artery angioplasty and stenting History of Carotid artery angioplasty and stenting History of Carotid artery angioplasty and stenting  Preop dx malignant neoplasm of bladder, unspecified

## 2020-02-24 NOTE — HISTORY OF PRESENT ILLNESS
[Preoperative Visit] : for a medical evaluation prior to surgery [Scheduled Procedure ___] : a [unfilled] [Date of Surgery ___] : on [unfilled] [Surgeon Name ___] : surgeon: [unfilled] [FreeTextEntry1] : Mak has been feeling well. He is awaiting cystoscopy with biopsy. No chest pain, palpitations or shortness of breath.

## 2020-02-24 NOTE — H&P PST ADULT - HISTORY OF PRESENT ILLNESS
74 year old male male with past medical history of w/ bi-annual phlebotomies (next due in December 2019, HTN, HLD, Bladder Cancer, Carotid artery angioplasty and stenting, history of stroke in May 2019. Patient reports recent history of hematuria, " CT scan showed a left upper pole transitional carcinoma of the bladder". Patient was referred to Dr. Mann for an evaluation.  Pre op diagnosis: Malignant neoplasm of bladder, unspecified. Patient is scheduled for Left partial nephrectomy scheduled on 11/8/2019. 74 year old male  with PMHx of w/ bi-annual phlebotomies (next due in December 2019, HTN, HLD, Bladder Cancer, Carotid artery angioplasty and stenting, history of stroke in May 2019. Patient reports  history of hematuria, " CT scan showed a left upper pole transitional carcinoma of the bladder.  S/p  Left nephrectomy scheduled on 11/8/2019.  Now scheduled for cystoscopy, possible bladder biopsy.

## 2020-02-24 NOTE — H&P PST ADULT - NEGATIVE RESPIRATORY AND THORAX SYMPTOMS
no wheezing/no dyspnea/no cough no dyspnea/no wheezing/no cough/no hemoptysis/no pleuritic chest pain

## 2020-02-24 NOTE — H&P PST ADULT - FUNCTIONAL LEVEL PRIOR: EATING
0 = independent

## 2020-02-24 NOTE — H&P PST ADULT - PRIMARY CARE PROVIDER
Dr. Mak Germain - [phone]             Dr. Sanford - [phone]- Neurologist,  Dr Cabrera (hematologist)[phone])

## 2020-02-24 NOTE — H&P PST ADULT - NSICDXPASTMEDICALHX_GEN_ALL_CORE_FT
PAST MEDICAL HISTORY:  H/O carotid artery stenosis     Hemochromatosis     Hyperlipemia     Hypertension     Malignant neoplasm of bladder wall     Malignant neoplasm of bladder, unspecified     Osteoarthritis     Stroke PAST MEDICAL HISTORY:  Elevated liver enzymes h/o    H/O carotid artery stenosis     Hemochromatosis     Hyperlipemia     Hypertension     Malignant neoplasm of bladder wall     Malignant neoplasm of bladder, unspecified     Osteoarthritis     Stroke 5/2019

## 2020-02-24 NOTE — H&P PST ADULT - NEGATIVE GENERAL GENITOURINARY SYMPTOMS
no urine discoloration/no bladder infections/no dysuria/no flank pain L/normal urinary frequency/no urinary hesitancy/no nocturia/no hematuria/no flank pain R/no incontinence

## 2020-02-24 NOTE — DISCUSSION/SUMMARY
[Procedure Low Risk] : the procedure risk is low [Patient Intermediate Risk] : the patient is an intermediate risk [Optimized for Surgery] : the patient is optimized for surgery [FreeTextEntry1] : The patient is a 74-year-old gentleman history of bladder cancer, carotid artery stenosis. hypertension, hyperlipidemia s/p MCA stroke,  LICA stent, s/p nephrectomy for renal cell awaiting cystoscopy.\par #1 ALEX- on DAPT s/p LICA stent \par #2 Htn- controlled on valsartan/hctz\par #3 Lipids-on atorvastatin 80mg\par #4 - There are no cardiac contraindications to cystoscopy off plavix for one week and on aspirin 81mg daily as discussed with Dr. Mann.\par #5 CV- ECHO negative, nuclear stress negative\par

## 2020-02-24 NOTE — H&P PST ADULT - ACTIVITY
Walking 2 miles x 5 days , climbing up an down stairs, house chores, shopping, ADLs , Golfing Walking 2 miles x 5 day, golf  climbing up an down stairs, house chores, shopping, ADLs , Golfing

## 2020-02-24 NOTE — H&P PST ADULT - NEGATIVE ENMT SYMPTOMS
no hearing difficulty/no vertigo/no post-nasal discharge/no abnormal taste sensation/no ear pain/no nasal congestion/no nasal discharge/no nasal obstruction/no recurrent cold sores/no dysphagia/no tinnitus/no sinus symptoms/no nose bleeds/no gum bleeding/no dry mouth/no throat pain

## 2020-02-24 NOTE — H&P PST ADULT - NEGATIVE OPHTHALMOLOGIC SYMPTOMS
no discharge R/no irritation R/no diplopia/no photophobia/no blurred vision L/no blurred vision R/no pain L/no discharge L/no pain R/no irritation L

## 2020-02-24 NOTE — H&P PST ADULT - NEGATIVE GASTROINTESTINAL SYMPTOMS
no vomiting/no diarrhea/no constipation/no abdominal pain/no nausea/no change in bowel habits/no melena

## 2020-02-24 NOTE — PHYSICAL EXAM
[General Appearance - Well Developed] : well developed [Well Groomed] : well groomed [Normal Appearance] : normal appearance [Normal Conjunctiva] : the conjunctiva exhibited no abnormalities [General Appearance - Well Nourished] : well nourished [General Appearance - In No Acute Distress] : no acute distress [No Deformities] : no deformities [Eyelids - No Xanthelasma] : the eyelids demonstrated no xanthelasmas [Normal Oral Mucosa] : normal oral mucosa [No Oral Pallor] : no oral pallor [Normal Jugular Venous A Waves Present] : normal jugular venous A waves present [No Oral Cyanosis] : no oral cyanosis [Normal Jugular Venous V Waves Present] : normal jugular venous V waves present [No Jugular Venous [place] A Waves] : no jugular venous de la paz A waves [Respiration, Rhythm And Depth] : normal respiratory rhythm and effort [Exaggerated Use Of Accessory Muscles For Inspiration] : no accessory muscle use [Auscultation Breath Sounds / Voice Sounds] : lungs were clear to auscultation bilaterally [Heart Rate And Rhythm] : heart rate and rhythm were normal [Murmurs] : no murmurs present [Heart Sounds] : normal S1 and S2 [Abdomen Tenderness] : non-tender [Abdomen Soft] : soft [Gait - Sufficient For Exercise Testing] : the gait was sufficient for exercise testing [Abdomen Mass (___ Cm)] : no abdominal mass palpated [Abnormal Walk] : normal gait [Cyanosis, Localized] : no localized cyanosis [Nail Clubbing] : no clubbing of the fingernails [Petechial Hemorrhages (___cm)] : no petechial hemorrhages [] : no rash [No Venous Stasis] : no venous stasis [Skin Color & Pigmentation] : normal skin color and pigmentation [No Skin Ulcers] : no skin ulcer [Skin Lesions] : no skin lesions [No Xanthoma] : no  xanthoma was observed [Oriented To Time, Place, And Person] : oriented to person, place, and time [Mood] : the mood was normal [Affect] : the affect was normal [No Anxiety] : not feeling anxious

## 2020-02-24 NOTE — H&P PST ADULT - NSICDXPASTSURGICALHX_GEN_ALL_CORE_FT
PAST SURGICAL HISTORY:  cardiac cath > 7 yrs. ago-neg-  at [facility]     H/O arthroscopy of knee- 2009     H/O total knee replacement, right     History of carotid artery stenosis Carotid artery angioplasty and stenting 6/2019    History of cataract surgery     History of cystoscopy w/biopsy X 2 in 2004     History of nephrectomy, unilateral left 11/2019    History of total knee replacement, left PAST SURGICAL HISTORY:  cardiac cath > 7 yrs. ago-neg-  at [facility]     H/O arthroscopy of knee- 2009     H/O total knee replacement, right     History of carotid artery stenosis Carotid artery angioplasty and stenting 6/2019    History of cataract surgery     History of cystoscopy w/biopsy X 2 in 2004     History of nephrectomy, unilateral left 11/2019    History of total knee replacement, left

## 2020-02-25 LAB
BACTERIA UR CULT: SIGNIFICANT CHANGE UP
SPECIMEN SOURCE: SIGNIFICANT CHANGE UP

## 2020-03-01 ENCOUNTER — TRANSCRIPTION ENCOUNTER (OUTPATIENT)
Age: 75
End: 2020-03-01

## 2020-03-02 ENCOUNTER — APPOINTMENT (OUTPATIENT)
Dept: UROLOGY | Facility: AMBULATORY SURGERY CENTER | Age: 75
End: 2020-03-02

## 2020-03-02 ENCOUNTER — OUTPATIENT (OUTPATIENT)
Dept: OUTPATIENT SERVICES | Facility: HOSPITAL | Age: 75
LOS: 1 days | Discharge: HOME | End: 2020-03-02
Payer: MEDICARE

## 2020-03-02 ENCOUNTER — RESULT REVIEW (OUTPATIENT)
Age: 75
End: 2020-03-02

## 2020-03-02 VITALS
WEIGHT: 182.98 LBS | HEIGHT: 67 IN | HEART RATE: 77 BPM | SYSTOLIC BLOOD PRESSURE: 158 MMHG | RESPIRATION RATE: 18 BRPM | TEMPERATURE: 98 F | DIASTOLIC BLOOD PRESSURE: 64 MMHG | OXYGEN SATURATION: 97 %

## 2020-03-02 VITALS
RESPIRATION RATE: 18 BRPM | HEART RATE: 69 BPM | OXYGEN SATURATION: 100 % | TEMPERATURE: 98 F | SYSTOLIC BLOOD PRESSURE: 141 MMHG | DIASTOLIC BLOOD PRESSURE: 69 MMHG

## 2020-03-02 DIAGNOSIS — Z96.651 PRESENCE OF RIGHT ARTIFICIAL KNEE JOINT: Chronic | ICD-10-CM

## 2020-03-02 DIAGNOSIS — C67.9 MALIGNANT NEOPLASM OF BLADDER, UNSPECIFIED: ICD-10-CM

## 2020-03-02 DIAGNOSIS — Z86.79 PERSONAL HISTORY OF OTHER DISEASES OF THE CIRCULATORY SYSTEM: Chronic | ICD-10-CM

## 2020-03-02 DIAGNOSIS — Z96.652 PRESENCE OF LEFT ARTIFICIAL KNEE JOINT: Chronic | ICD-10-CM

## 2020-03-02 DIAGNOSIS — Z90.5 ACQUIRED ABSENCE OF KIDNEY: Chronic | ICD-10-CM

## 2020-03-02 DIAGNOSIS — Z98.49 CATARACT EXTRACTION STATUS, UNSPECIFIED EYE: Chronic | ICD-10-CM

## 2020-03-02 PROCEDURE — 52235 CYSTOSCOPY AND TREATMENT: CPT

## 2020-03-02 PROCEDURE — 88112 CYTOPATH CELL ENHANCE TECH: CPT | Mod: 26

## 2020-03-02 RX ORDER — AMLODIPINE BESYLATE 2.5 MG/1
1 TABLET ORAL
Qty: 0 | Refills: 0 | DISCHARGE

## 2020-03-02 RX ORDER — CLOPIDOGREL BISULFATE 75 MG/1
1 TABLET, FILM COATED ORAL
Qty: 0 | Refills: 0 | DISCHARGE

## 2020-03-02 RX ORDER — TAMSULOSIN HYDROCHLORIDE 0.4 MG/1
1 CAPSULE ORAL
Qty: 0 | Refills: 0 | DISCHARGE

## 2020-03-02 RX ORDER — ASPIRIN/CALCIUM CARB/MAGNESIUM 324 MG
1 TABLET ORAL
Qty: 0 | Refills: 0 | DISCHARGE

## 2020-03-02 RX ORDER — ATORVASTATIN CALCIUM 80 MG/1
1 TABLET, FILM COATED ORAL
Qty: 0 | Refills: 0 | DISCHARGE

## 2020-03-02 RX ORDER — TRAMADOL HYDROCHLORIDE 50 MG/1
1 TABLET ORAL
Qty: 0 | Refills: 0 | DISCHARGE

## 2020-03-03 ENCOUNTER — RESULT REVIEW (OUTPATIENT)
Age: 75
End: 2020-03-03

## 2020-03-03 LAB
NON-GYNECOLOGICAL CYTOLOGY STUDY: SIGNIFICANT CHANGE UP
SPECIMEN SOURCE: SIGNIFICANT CHANGE UP

## 2020-03-03 PROCEDURE — 88307 TISSUE EXAM BY PATHOLOGIST: CPT | Mod: 26

## 2020-03-05 LAB
BACTERIA UR CULT: SIGNIFICANT CHANGE UP
SURGICAL PATHOLOGY STUDY: SIGNIFICANT CHANGE UP

## 2020-03-09 PROBLEM — R74.8 ABNORMAL LEVELS OF OTHER SERUM ENZYMES: Chronic | Status: ACTIVE | Noted: 2020-02-24

## 2020-03-09 PROBLEM — I63.9 CEREBRAL INFARCTION, UNSPECIFIED: Chronic | Status: ACTIVE | Noted: 2019-10-31

## 2020-03-17 ENCOUNTER — RX RENEWAL (OUTPATIENT)
Age: 75
End: 2020-03-17

## 2020-03-30 NOTE — ASU DISCHARGE PLAN (ADULT/PEDIATRIC) - A. DRIVE A CAR, OPERATE POWER TOOLS OR MACHINERY
Patient:   JULIAN GRACE            MRN: [id number]            FIN: [id number]              Age:   72 years     Sex:  MALE     :  [birth year]   Associated Diagnoses:   None   Author:   JULIAN MCCARTHY     Date of Surgery: s/p sternectomy on 3/18.  Subjective: Pt was examined intubated and sedated this AM.  Objective:   Physical Exam:      General: No acute distress.     Neuro: +Sedate.     Cardiac: Normal cardiac rate. Regular rhythm.     Respiratory: +Intubated.     Abdominal: Abd is soft and non-tender without distension.      Extremities: Warm and well-perfused.  Vitals between:   20-MAR-2020 10:38:56   TO   21-MAR-2020 10:38:56                   LAST RESULT MINIMUM MAXIMUM  Heart Rate 84 54 84  Respiratory Rate 25 12 25  A Line Systolic 126 109 161  A Line Diastolic 40 35 54  A Line Mean 64 53 82  CVP                     11 5 12  SpO2                    95 95 100  FiO2                    0.4 0.4 0.4   Labs and imaging over the last 24 hrs have been reviewed.  Assessment:  72 yoM presents with clinically stable s/p sternectomy on 3/18.  CAD s/p sternectomy on 3/18.  Plan:  -D/c INH albuterol 180 mcg Q6H and administer INH albuterol 3 mL Q6H.  -Plan for extubation today.  Charting performed by nikolai Camarillo for Dr. Waters.  All medical record entries made by the scribe were at my direction. I have reviewed the chart and agree that the record accurately reflects my personal performance of the history, physical exam, hospital course, and assessment and plan.   Plan of care reviewed. VSS. Telemetry NSR. Wife at bedside . Patient had no complaints of pain this shift . Patient c/o shortness of breath when lying flat . HOB must be elevated. Ambulated without assistance. No needs at this time . Bed locked and in lowest positio , call bell within reach . Will continue to monitor.  Patient to be discharged today.  Patient did have a small BM.     Statement Selected

## 2020-04-13 ENCOUNTER — RX RENEWAL (OUTPATIENT)
Age: 75
End: 2020-04-13

## 2020-05-15 ENCOUNTER — RX RENEWAL (OUTPATIENT)
Age: 75
End: 2020-05-15

## 2020-06-18 ENCOUNTER — RX RENEWAL (OUTPATIENT)
Age: 75
End: 2020-06-18

## 2020-07-20 ENCOUNTER — RX RENEWAL (OUTPATIENT)
Age: 75
End: 2020-07-20

## 2020-08-24 ENCOUNTER — RX RENEWAL (OUTPATIENT)
Age: 75
End: 2020-08-24

## 2020-09-10 ENCOUNTER — APPOINTMENT (OUTPATIENT)
Dept: UROLOGY | Facility: CLINIC | Age: 75
End: 2020-09-10
Payer: MEDICARE

## 2020-09-10 ENCOUNTER — OUTPATIENT (OUTPATIENT)
Dept: OUTPATIENT SERVICES | Facility: HOSPITAL | Age: 75
LOS: 1 days | End: 2020-09-10
Payer: MEDICARE

## 2020-09-10 VITALS — DIASTOLIC BLOOD PRESSURE: 75 MMHG | RESPIRATION RATE: 17 BRPM | SYSTOLIC BLOOD PRESSURE: 176 MMHG | HEART RATE: 86 BPM

## 2020-09-10 VITALS — TEMPERATURE: 97.7 F

## 2020-09-10 DIAGNOSIS — Z86.79 PERSONAL HISTORY OF OTHER DISEASES OF THE CIRCULATORY SYSTEM: Chronic | ICD-10-CM

## 2020-09-10 DIAGNOSIS — R35.0 FREQUENCY OF MICTURITION: ICD-10-CM

## 2020-09-10 DIAGNOSIS — Z96.652 PRESENCE OF LEFT ARTIFICIAL KNEE JOINT: Chronic | ICD-10-CM

## 2020-09-10 DIAGNOSIS — Z90.5 ACQUIRED ABSENCE OF KIDNEY: Chronic | ICD-10-CM

## 2020-09-10 DIAGNOSIS — Z98.49 CATARACT EXTRACTION STATUS, UNSPECIFIED EYE: Chronic | ICD-10-CM

## 2020-09-10 DIAGNOSIS — Z96.651 PRESENCE OF RIGHT ARTIFICIAL KNEE JOINT: Chronic | ICD-10-CM

## 2020-09-10 LAB
APPEARANCE: CLEAR
BACTERIA: NEGATIVE
BILIRUBIN URINE: NEGATIVE
BLOOD URINE: NEGATIVE
COLOR: NORMAL
GLUCOSE QUALITATIVE U: NEGATIVE
HYALINE CASTS: 0 /LPF
KETONES URINE: NEGATIVE
LEUKOCYTE ESTERASE URINE: NEGATIVE
MICROSCOPIC-UA: NORMAL
NITRITE URINE: NEGATIVE
PH URINE: 7
PROTEIN URINE: NEGATIVE
RED BLOOD CELLS URINE: 1 /HPF
SPECIFIC GRAVITY URINE: 1.02
SQUAMOUS EPITHELIAL CELLS: 1 /HPF
UROBILINOGEN URINE: NORMAL
WHITE BLOOD CELLS URINE: 0 /HPF

## 2020-09-10 PROCEDURE — 52000 CYSTOURETHROSCOPY: CPT

## 2020-09-10 PROCEDURE — 99213 OFFICE O/P EST LOW 20 MIN: CPT | Mod: 25

## 2020-09-10 PROCEDURE — 88112 CYTOPATH CELL ENHANCE TECH: CPT | Mod: 26

## 2020-09-11 LAB — URINE CYTOLOGY: NORMAL

## 2020-09-15 NOTE — DISCHARGE NOTE NURSING/CASE MANAGEMENT/SOCIAL WORK - NSTRANSFERBELONGINGSRESP_GEN_A_NUR
yes Stelara Counseling:  I discussed with the patient the risks of ustekinumab including but not limited to immunosuppression, malignancy, posterior leukoencephalopathy syndrome, and serious infections.  The patient understands that monitoring is required including a PPD at baseline and must alert us or the primary physician if symptoms of infection or other concerning signs are noted.

## 2020-09-22 DIAGNOSIS — C65.9 MALIGNANT NEOPLASM OF UNSPECIFIED RENAL PELVIS: ICD-10-CM

## 2020-09-22 DIAGNOSIS — C67.9 MALIGNANT NEOPLASM OF BLADDER, UNSPECIFIED: ICD-10-CM

## 2020-09-24 ENCOUNTER — RX RENEWAL (OUTPATIENT)
Age: 75
End: 2020-09-24

## 2020-10-27 ENCOUNTER — RX RENEWAL (OUTPATIENT)
Age: 75
End: 2020-10-27

## 2020-11-02 ENCOUNTER — RX RENEWAL (OUTPATIENT)
Age: 75
End: 2020-11-02

## 2020-12-01 ENCOUNTER — RESULT REVIEW (OUTPATIENT)
Age: 75
End: 2020-12-01

## 2020-12-01 ENCOUNTER — APPOINTMENT (OUTPATIENT)
Dept: CT IMAGING | Facility: IMAGING CENTER | Age: 75
End: 2020-12-01
Payer: MEDICARE

## 2020-12-01 ENCOUNTER — OUTPATIENT (OUTPATIENT)
Dept: OUTPATIENT SERVICES | Facility: HOSPITAL | Age: 75
LOS: 1 days | End: 2020-12-01
Payer: MEDICARE

## 2020-12-01 DIAGNOSIS — C67.9 MALIGNANT NEOPLASM OF BLADDER, UNSPECIFIED: ICD-10-CM

## 2020-12-01 DIAGNOSIS — Z86.79 PERSONAL HISTORY OF OTHER DISEASES OF THE CIRCULATORY SYSTEM: Chronic | ICD-10-CM

## 2020-12-01 DIAGNOSIS — Z96.651 PRESENCE OF RIGHT ARTIFICIAL KNEE JOINT: Chronic | ICD-10-CM

## 2020-12-01 DIAGNOSIS — Z90.5 ACQUIRED ABSENCE OF KIDNEY: Chronic | ICD-10-CM

## 2020-12-01 DIAGNOSIS — Z96.652 PRESENCE OF LEFT ARTIFICIAL KNEE JOINT: Chronic | ICD-10-CM

## 2020-12-01 DIAGNOSIS — Z98.49 CATARACT EXTRACTION STATUS, UNSPECIFIED EYE: Chronic | ICD-10-CM

## 2020-12-01 PROCEDURE — 74176 CT ABD & PELVIS W/O CONTRAST: CPT | Mod: 26

## 2020-12-01 PROCEDURE — 74176 CT ABD & PELVIS W/O CONTRAST: CPT

## 2020-12-02 ENCOUNTER — RX RENEWAL (OUTPATIENT)
Age: 75
End: 2020-12-02

## 2020-12-07 ENCOUNTER — RX RENEWAL (OUTPATIENT)
Age: 75
End: 2020-12-07

## 2021-01-04 ENCOUNTER — RX RENEWAL (OUTPATIENT)
Age: 76
End: 2021-01-04

## 2021-02-11 ENCOUNTER — RX RENEWAL (OUTPATIENT)
Age: 76
End: 2021-02-11

## 2021-02-22 ENCOUNTER — RX RENEWAL (OUTPATIENT)
Age: 76
End: 2021-02-22

## 2021-03-02 ENCOUNTER — RX RENEWAL (OUTPATIENT)
Age: 76
End: 2021-03-02

## 2021-03-04 ENCOUNTER — APPOINTMENT (OUTPATIENT)
Dept: CARDIOLOGY | Facility: CLINIC | Age: 76
End: 2021-03-04
Payer: MEDICARE

## 2021-03-04 PROCEDURE — 99214 OFFICE O/P EST MOD 30 MIN: CPT | Mod: 95

## 2021-03-04 NOTE — PHYSICAL EXAM
[General Appearance - Well Developed] : well developed [Normal Appearance] : normal appearance [Well Groomed] : well groomed [General Appearance - Well Nourished] : well nourished [No Deformities] : no deformities [General Appearance - In No Acute Distress] : no acute distress [Normal Conjunctiva] : the conjunctiva exhibited no abnormalities [Eyelids - No Xanthelasma] : the eyelids demonstrated no xanthelasmas [Normal Oral Mucosa] : normal oral mucosa [No Oral Pallor] : no oral pallor [No Oral Cyanosis] : no oral cyanosis [Normal Jugular Venous A Waves Present] : normal jugular venous A waves present [Normal Jugular Venous V Waves Present] : normal jugular venous V waves present [No Jugular Venous De [place]az A Waves] : no jugular venous de la paz A waves [Oriented To Time, Place, And Person] : oriented to person, place, and time [Affect] : the affect was normal [Mood] : the mood was normal [No Anxiety] : not feeling anxious

## 2021-03-04 NOTE — DISCUSSION/SUMMARY
[FreeTextEntry1] : The patient is a 75-year-old gentleman history of bladder and renal cell cancer, carotid artery stenosis. hypertension, hyperlipidemia s/p MCA stroke,  LICA stent, who is doing well\par #1 ALEX- continue DAPT s/p LICA stent \par #2 Htn- continue amlodipine\par #3 Lipids-on atorvastatin 80mg\par #4 - s/p tx bladder and nephrectomy for renal cell\par #5 CV- ECHO negative, nuclear stress negative, received first dose Pfizer vaccine\par

## 2021-03-04 NOTE — HISTORY OF PRESENT ILLNESS
[Home] : at home, [unfilled] , at the time of the visit. [Medical Office: ([facility])___] : at the medical office located in  [Verbal consent obtained from patient] : the patient, [unfilled] [FreeTextEntry1] : Mak has been feeling well. Walks several miles a day with no chest pain,palpitations or shortness of breath. Recent CPE and carotid reviewed. /70 Weight stable at 180.

## 2021-03-22 ENCOUNTER — RX RENEWAL (OUTPATIENT)
Age: 76
End: 2021-03-22

## 2021-03-31 ENCOUNTER — APPOINTMENT (OUTPATIENT)
Dept: UROLOGY | Facility: CLINIC | Age: 76
End: 2021-03-31

## 2021-06-16 ENCOUNTER — RESULT REVIEW (OUTPATIENT)
Age: 76
End: 2021-06-16

## 2021-06-16 ENCOUNTER — APPOINTMENT (OUTPATIENT)
Dept: UROLOGY | Facility: CLINIC | Age: 76
End: 2021-06-16
Payer: MEDICARE

## 2021-06-16 ENCOUNTER — OUTPATIENT (OUTPATIENT)
Dept: OUTPATIENT SERVICES | Facility: HOSPITAL | Age: 76
LOS: 1 days | End: 2021-06-16
Payer: MEDICARE

## 2021-06-16 VITALS
SYSTOLIC BLOOD PRESSURE: 181 MMHG | BODY MASS INDEX: 27.74 KG/M2 | TEMPERATURE: 98 F | DIASTOLIC BLOOD PRESSURE: 70 MMHG | HEIGHT: 68 IN | RESPIRATION RATE: 17 BRPM | HEART RATE: 84 BPM | WEIGHT: 183 LBS

## 2021-06-16 DIAGNOSIS — Z86.79 PERSONAL HISTORY OF OTHER DISEASES OF THE CIRCULATORY SYSTEM: Chronic | ICD-10-CM

## 2021-06-16 DIAGNOSIS — Z96.652 PRESENCE OF LEFT ARTIFICIAL KNEE JOINT: Chronic | ICD-10-CM

## 2021-06-16 DIAGNOSIS — Z98.49 CATARACT EXTRACTION STATUS, UNSPECIFIED EYE: Chronic | ICD-10-CM

## 2021-06-16 DIAGNOSIS — Z90.5 ACQUIRED ABSENCE OF KIDNEY: Chronic | ICD-10-CM

## 2021-06-16 DIAGNOSIS — Z96.651 PRESENCE OF RIGHT ARTIFICIAL KNEE JOINT: Chronic | ICD-10-CM

## 2021-06-16 DIAGNOSIS — R35.0 FREQUENCY OF MICTURITION: ICD-10-CM

## 2021-06-16 DIAGNOSIS — C67.9 MALIGNANT NEOPLASM OF BLADDER, UNSPECIFIED: ICD-10-CM

## 2021-06-16 DIAGNOSIS — N40.1 BENIGN PROSTATIC HYPERPLASIA WITH LOWER URINARY TRACT SYMPTOMS: ICD-10-CM

## 2021-06-16 LAB
APPEARANCE: CLEAR
BACTERIA: NEGATIVE
BILIRUBIN URINE: NEGATIVE
BLOOD URINE: NEGATIVE
COLOR: NORMAL
GLUCOSE QUALITATIVE U: NEGATIVE
HYALINE CASTS: 0 /LPF
KETONES URINE: NEGATIVE
LEUKOCYTE ESTERASE URINE: NEGATIVE
MICROSCOPIC-UA: NORMAL
NITRITE URINE: NEGATIVE
PH URINE: 6
PROTEIN URINE: NEGATIVE
PSA FREE FLD-MCNC: 12 %
PSA FREE SERPL-MCNC: 0.82 NG/ML
PSA SERPL-MCNC: 6.67 NG/ML
RED BLOOD CELLS URINE: 0 /HPF
SPECIFIC GRAVITY URINE: 1.02
SQUAMOUS EPITHELIAL CELLS: 0 /HPF
UROBILINOGEN URINE: NORMAL
WHITE BLOOD CELLS URINE: 0 /HPF

## 2021-06-16 PROCEDURE — 52000 CYSTOURETHROSCOPY: CPT

## 2021-06-16 PROCEDURE — 99213 OFFICE O/P EST LOW 20 MIN: CPT | Mod: 25

## 2021-06-16 PROCEDURE — 88112 CYTOPATH CELL ENHANCE TECH: CPT | Mod: 26

## 2021-06-16 NOTE — PHYSICAL EXAM
[General Appearance - Well Developed] : well developed [General Appearance - Well Nourished] : well nourished [Normal Appearance] : normal appearance [Well Groomed] : well groomed [General Appearance - In No Acute Distress] : no acute distress [Abdomen Soft] : soft [Abdomen Tenderness] : non-tender [Costovertebral Angle Tenderness] : no ~M costovertebral angle tenderness [Urethral Meatus] : meatus normal [Urinary Bladder Findings] : the bladder was normal on palpation [Scrotum] : the scrotum was normal [Testes Mass (___cm)] : there were no testicular masses [No Prostate Nodules] : no prostate nodules [Edema] : no peripheral edema [] : no respiratory distress [Exaggerated Use Of Accessory Muscles For Inspiration] : no accessory muscle use [Respiration, Rhythm And Depth] : normal respiratory rhythm and effort [Oriented To Time, Place, And Person] : oriented to person, place, and time [Affect] : the affect was normal [Not Anxious] : not anxious [Mood] : the mood was normal [Normal Station and Gait] : the gait and station were normal for the patient's age [No Focal Deficits] : no focal deficits [No Palpable Adenopathy] : no palpable adenopathy

## 2021-06-21 LAB — URINE CYTOLOGY: NORMAL

## 2021-06-22 ENCOUNTER — APPOINTMENT (OUTPATIENT)
Dept: CT IMAGING | Facility: IMAGING CENTER | Age: 76
End: 2021-06-22
Payer: MEDICARE

## 2021-06-22 ENCOUNTER — OUTPATIENT (OUTPATIENT)
Dept: OUTPATIENT SERVICES | Facility: HOSPITAL | Age: 76
LOS: 1 days | End: 2021-06-22
Payer: MEDICARE

## 2021-06-22 DIAGNOSIS — Z90.5 ACQUIRED ABSENCE OF KIDNEY: Chronic | ICD-10-CM

## 2021-06-22 DIAGNOSIS — Z98.49 CATARACT EXTRACTION STATUS, UNSPECIFIED EYE: Chronic | ICD-10-CM

## 2021-06-22 DIAGNOSIS — Z96.651 PRESENCE OF RIGHT ARTIFICIAL KNEE JOINT: Chronic | ICD-10-CM

## 2021-06-22 DIAGNOSIS — Z86.79 PERSONAL HISTORY OF OTHER DISEASES OF THE CIRCULATORY SYSTEM: Chronic | ICD-10-CM

## 2021-06-22 DIAGNOSIS — Z96.652 PRESENCE OF LEFT ARTIFICIAL KNEE JOINT: Chronic | ICD-10-CM

## 2021-06-22 DIAGNOSIS — C67.9 MALIGNANT NEOPLASM OF BLADDER, UNSPECIFIED: ICD-10-CM

## 2021-06-22 PROCEDURE — 74176 CT ABD & PELVIS W/O CONTRAST: CPT | Mod: 26,MH

## 2021-06-22 PROCEDURE — 74176 CT ABD & PELVIS W/O CONTRAST: CPT

## 2021-07-18 NOTE — PHYSICAL EXAM
[General Appearance - Well Developed] : well developed [General Appearance - Well Nourished] : well nourished [Normal Appearance] : normal appearance [Well Groomed] : well groomed [General Appearance - In No Acute Distress] : no acute distress [Abdomen Soft] : soft [Abdomen Tenderness] : non-tender [Costovertebral Angle Tenderness] : no ~M costovertebral angle tenderness [Urethral Meatus] : meatus normal [Urinary Bladder Findings] : the bladder was normal on palpation [Testes Mass (___cm)] : there were no testicular masses [Scrotum] : the scrotum was normal [No Prostate Nodules] : no prostate nodules [Edema] : no peripheral edema [] : no respiratory distress [Respiration, Rhythm And Depth] : normal respiratory rhythm and effort [Exaggerated Use Of Accessory Muscles For Inspiration] : no accessory muscle use [Oriented To Time, Place, And Person] : oriented to person, place, and time [Affect] : the affect was normal [Not Anxious] : not anxious [Mood] : the mood was normal [Normal Station and Gait] : the gait and station were normal for the patient's age [No Focal Deficits] : no focal deficits [No Palpable Adenopathy] : no palpable adenopathy Admitted

## 2021-08-30 NOTE — ED ADULT NURSE NOTE - NSFALLRSKINDICATORS_ED_ALL_ED
-KEEP DRESSING ON EYE UNTIL OFFICE VISIT TOMORROW    -DO NOT SLEEP ON OPERATIVE SIDE    -RESUME HOME MEDICATIONS    -RESUME REGULAR DIET    -DO NOT TOUCH EYE    -TYLENOL, ADVIL OR ALEVE CAN BE TAKEN FOR MINOR DISCOMFORT IN THE EYE.  
no

## 2021-09-02 ENCOUNTER — APPOINTMENT (OUTPATIENT)
Dept: CARDIOLOGY | Facility: CLINIC | Age: 76
End: 2021-09-02
Payer: MEDICARE

## 2021-09-02 ENCOUNTER — NON-APPOINTMENT (OUTPATIENT)
Age: 76
End: 2021-09-02

## 2021-09-02 VITALS — SYSTOLIC BLOOD PRESSURE: 146 MMHG | DIASTOLIC BLOOD PRESSURE: 68 MMHG

## 2021-09-02 VITALS
OXYGEN SATURATION: 93 % | HEART RATE: 75 BPM | WEIGHT: 179 LBS | HEIGHT: 68 IN | DIASTOLIC BLOOD PRESSURE: 80 MMHG | TEMPERATURE: 97.3 F | SYSTOLIC BLOOD PRESSURE: 170 MMHG | RESPIRATION RATE: 12 BRPM | BODY MASS INDEX: 27.13 KG/M2

## 2021-09-02 DIAGNOSIS — G45.9 TRANSIENT CEREBRAL ISCHEMIC ATTACK, UNSPECIFIED: ICD-10-CM

## 2021-09-02 PROCEDURE — 99214 OFFICE O/P EST MOD 30 MIN: CPT

## 2021-09-05 PROBLEM — G45.9 TIA (TRANSIENT ISCHEMIC ATTACK): Status: ACTIVE | Noted: 2019-05-30

## 2021-09-05 NOTE — REVIEW OF SYSTEMS
[SOB] : no shortness of breath [Dyspnea on exertion] : not dyspnea during exertion [Chest Discomfort] : no chest discomfort [Lower Ext Edema] : no extremity edema [Leg Claudication] : no intermittent leg claudication [Syncope] : no syncope [Palpitations] : no palpitations

## 2021-09-05 NOTE — PHYSICAL EXAM
[Well Developed] : well developed [Well Nourished] : well nourished [No Acute Distress] : no acute distress [Normal Conjunctiva] : normal conjunctiva [Normal Venous Pressure] : normal venous pressure [No Carotid Bruit] : no carotid bruit [Normal S1, S2] : normal S1, S2 [No Murmur] : no murmur [No Rub] : no rub [Clear Lung Fields] : clear lung fields [No Gallop] : no gallop [Good Air Entry] : good air entry [No Respiratory Distress] : no respiratory distress  [Soft] : abdomen soft [Non Tender] : non-tender [No Masses/organomegaly] : no masses/organomegaly [Normal Bowel Sounds] : normal bowel sounds [Normal Gait] : normal gait [No Edema] : no edema [No Cyanosis] : no cyanosis [No Varicosities] : no varicosities [No Clubbing] : no clubbing [No Rash] : no rash [No Skin Lesions] : no skin lesions [Moves all extremities] : moves all extremities [No Focal Deficits] : no focal deficits [Alert and Oriented] : alert and oriented [Normal Speech] : normal speech [Normal memory] : normal memory

## 2021-09-05 NOTE — HISTORY OF PRESENT ILLNESS
[FreeTextEntry1] : Mak has been well but under stress. Son is . got COVID and was hospitalized, during stay developed signs of leukemia and now undergoing chemotherapy.

## 2021-09-05 NOTE — DISCUSSION/SUMMARY
[FreeTextEntry1] : The patient is a 76-year-old gentleman history of bladder and renal cell cancer, carotid artery stenosis. hypertension, hyperlipidemia s/p MCA stroke,  LICA stent, who is doing well\par #1 ALEX- continue DAPT s/p LICA stent \par #2 Htn- continue amlodipine\par #3 Lipids-continue atorvastatin 80mg\par #4 - s/p tx bladder and nephrectomy for renal cell, f/u Dr. Mann\par #5 CV- ECHO negative, nuclear stress negative, received  Pfizer vaccine\par

## 2021-10-18 ENCOUNTER — RX RENEWAL (OUTPATIENT)
Age: 76
End: 2021-10-18

## 2021-12-15 ENCOUNTER — APPOINTMENT (OUTPATIENT)
Dept: UROLOGY | Facility: CLINIC | Age: 76
End: 2021-12-15
Payer: MEDICARE

## 2021-12-15 ENCOUNTER — OUTPATIENT (OUTPATIENT)
Dept: OUTPATIENT SERVICES | Facility: HOSPITAL | Age: 76
LOS: 1 days | End: 2021-12-15
Payer: MEDICARE

## 2021-12-15 VITALS — TEMPERATURE: 98.7 F | DIASTOLIC BLOOD PRESSURE: 63 MMHG | SYSTOLIC BLOOD PRESSURE: 160 MMHG | HEART RATE: 92 BPM

## 2021-12-15 DIAGNOSIS — Z90.5 ACQUIRED ABSENCE OF KIDNEY: Chronic | ICD-10-CM

## 2021-12-15 DIAGNOSIS — R35.0 FREQUENCY OF MICTURITION: ICD-10-CM

## 2021-12-15 DIAGNOSIS — Z86.79 PERSONAL HISTORY OF OTHER DISEASES OF THE CIRCULATORY SYSTEM: Chronic | ICD-10-CM

## 2021-12-15 DIAGNOSIS — Z98.49 CATARACT EXTRACTION STATUS, UNSPECIFIED EYE: Chronic | ICD-10-CM

## 2021-12-15 DIAGNOSIS — Z96.652 PRESENCE OF LEFT ARTIFICIAL KNEE JOINT: Chronic | ICD-10-CM

## 2021-12-15 DIAGNOSIS — C65.9 MALIGNANT NEOPLASM OF UNSPECIFIED RENAL PELVIS: ICD-10-CM

## 2021-12-15 DIAGNOSIS — Z96.651 PRESENCE OF RIGHT ARTIFICIAL KNEE JOINT: Chronic | ICD-10-CM

## 2021-12-15 DIAGNOSIS — N40.1 BENIGN PROSTATIC HYPERPLASIA WITH LOWER URINARY TRACT SYMPTOMS: ICD-10-CM

## 2021-12-15 PROCEDURE — 99214 OFFICE O/P EST MOD 30 MIN: CPT | Mod: 25

## 2021-12-15 PROCEDURE — 88112 CYTOPATH CELL ENHANCE TECH: CPT | Mod: 26

## 2021-12-15 PROCEDURE — 52000 CYSTOURETHROSCOPY: CPT

## 2021-12-16 LAB
PSA FREE FLD-MCNC: 16 %
PSA FREE SERPL-MCNC: 1.27 NG/ML
PSA SERPL-MCNC: 7.71 NG/ML

## 2021-12-17 LAB — URINE CYTOLOGY: NORMAL

## 2022-03-24 NOTE — PHYSICAL THERAPY INITIAL EVALUATION ADULT - PERSONAL SAFETY AND JUDGMENT, REHAB EVAL
"[facility] GERIATRICS  [place] Medical Record Number:  [id number]  Place of Service where encounter took place: [facility] ([facility]) [[id number]]    HPI:    Billy Stock is a 74 year old  (1947), who is being seen today for an episodic care visit at the above location. Today's concern is INR/Coumadin management for A. Fib    ROS/Subjective:  Bleeding Signs/Symptoms:  None  Thromboembolic Signs/Symptoms:  None  Medication Changes:  No  Dietary Changes:  Yes: Less leafy greens  Activity Changes: Yes: recovering from hip surgery  Bacterial/Viral Infection:  No  Missed Coumadin Doses:  None  On ASA: No  Other Concerns:  No    OBJECTIVE:  BP (!) 156/85   Pulse 53   Temp 98.9  F (37.2  C)   Resp 18   Ht 1.854 m (6' 1\")   Wt 96.2 kg (212 lb)   SpO2 98%   BMI 27.97 kg/m       Physical Exam  Constitutional:       Appearance: Normal appearance. He is obese.   HENT:      Head: Normocephalic and atraumatic.   Pulmonary:      Effort: Pulmonary effort is normal.   Musculoskeletal:         General: Normal range of motion.   Neurological:      Mental Status: He is alert. Mental status is at baseline.         Last INR:Historically very stable on warfarin 7.5 mg Monday/Thursday and 5 mg all other days.  Had continued PTA regimen on admission and INR 3/18 3.7.  Warfarin held.  INR 3/19  2.4.  Warfarin held. INR 3/20 2.4. INR 3/21 2.0  INR Today:  1.5  Current Dose:  3 mg/d    ASSESSMENT:  (Z51.81) Encounter for therapeutic drug monitoring  (primary encounter diagnosis)  (Z79.01) Long term (current) use of anticoagulants      Subtherapeutic INR for goal of 2-3    PLAN/ORDERS:     New Dose: Warfarin 5 mg/d    Next INR: 3/28      Electronically signed by:  Malinda Bragg PA-C     " intact

## 2022-04-01 ENCOUNTER — RX RENEWAL (OUTPATIENT)
Age: 77
End: 2022-04-01

## 2022-06-24 ENCOUNTER — RX RENEWAL (OUTPATIENT)
Age: 77
End: 2022-06-24

## 2022-06-29 ENCOUNTER — APPOINTMENT (OUTPATIENT)
Dept: UROLOGY | Facility: CLINIC | Age: 77
End: 2022-06-29
Payer: MEDICARE

## 2022-06-29 ENCOUNTER — OUTPATIENT (OUTPATIENT)
Dept: OUTPATIENT SERVICES | Facility: HOSPITAL | Age: 77
LOS: 1 days | End: 2022-06-29
Payer: MEDICARE

## 2022-06-29 VITALS — DIASTOLIC BLOOD PRESSURE: 72 MMHG | SYSTOLIC BLOOD PRESSURE: 176 MMHG | TEMPERATURE: 98 F | HEART RATE: 76 BPM

## 2022-06-29 DIAGNOSIS — C67.9 MALIGNANT NEOPLASM OF BLADDER, UNSPECIFIED: ICD-10-CM

## 2022-06-29 DIAGNOSIS — R97.20 ELEVATED PROSTATE SPECIFIC ANTIGEN [PSA]: ICD-10-CM

## 2022-06-29 DIAGNOSIS — Z86.79 PERSONAL HISTORY OF OTHER DISEASES OF THE CIRCULATORY SYSTEM: Chronic | ICD-10-CM

## 2022-06-29 DIAGNOSIS — Z96.652 PRESENCE OF LEFT ARTIFICIAL KNEE JOINT: Chronic | ICD-10-CM

## 2022-06-29 DIAGNOSIS — Z96.651 PRESENCE OF RIGHT ARTIFICIAL KNEE JOINT: Chronic | ICD-10-CM

## 2022-06-29 DIAGNOSIS — R97.20 ELEVATED PROSTATE, SPECIFIC ANTIGEN [PSA]: ICD-10-CM

## 2022-06-29 DIAGNOSIS — Z98.49 CATARACT EXTRACTION STATUS, UNSPECIFIED EYE: Chronic | ICD-10-CM

## 2022-06-29 DIAGNOSIS — R35.0 FREQUENCY OF MICTURITION: ICD-10-CM

## 2022-06-29 DIAGNOSIS — Z90.5 ACQUIRED ABSENCE OF KIDNEY: Chronic | ICD-10-CM

## 2022-06-29 DIAGNOSIS — N40.1 BENIGN PROSTATIC HYPERPLASIA WITH LOWER URINARY TRACT SYMPTOMS: ICD-10-CM

## 2022-06-29 PROCEDURE — 52000 CYSTOURETHROSCOPY: CPT

## 2022-06-29 PROCEDURE — 99214 OFFICE O/P EST MOD 30 MIN: CPT | Mod: 25

## 2022-06-29 PROCEDURE — 88112 CYTOPATH CELL ENHANCE TECH: CPT | Mod: 26

## 2022-06-30 ENCOUNTER — NON-APPOINTMENT (OUTPATIENT)
Age: 77
End: 2022-06-30

## 2022-06-30 ENCOUNTER — APPOINTMENT (OUTPATIENT)
Dept: CARDIOLOGY | Facility: CLINIC | Age: 77
End: 2022-06-30

## 2022-06-30 VITALS
SYSTOLIC BLOOD PRESSURE: 149 MMHG | WEIGHT: 177 LBS | HEIGHT: 68 IN | OXYGEN SATURATION: 99 % | BODY MASS INDEX: 26.83 KG/M2 | DIASTOLIC BLOOD PRESSURE: 71 MMHG | HEART RATE: 64 BPM

## 2022-06-30 LAB
APPEARANCE: CLEAR
BACTERIA: NEGATIVE
BILIRUBIN URINE: NEGATIVE
BLOOD URINE: ABNORMAL
COLOR: COLORLESS
GLUCOSE QUALITATIVE U: NEGATIVE
HYALINE CASTS: 0 /LPF
KETONES URINE: NEGATIVE
LEUKOCYTE ESTERASE URINE: NEGATIVE
MICROSCOPIC-UA: NORMAL
NITRITE URINE: NEGATIVE
PH URINE: 6.5
PROTEIN URINE: NEGATIVE
PSA FREE FLD-MCNC: 12 %
PSA FREE SERPL-MCNC: 0.26 NG/ML
PSA SERPL-MCNC: 2.24 NG/ML
RED BLOOD CELLS URINE: 1 /HPF
SPECIFIC GRAVITY URINE: 1.01
SQUAMOUS EPITHELIAL CELLS: 1 /HPF
URINE CYTOLOGY: NORMAL
UROBILINOGEN URINE: NORMAL
WHITE BLOOD CELLS URINE: 0 /HPF

## 2022-06-30 PROCEDURE — 99213 OFFICE O/P EST LOW 20 MIN: CPT

## 2022-06-30 PROCEDURE — 93000 ELECTROCARDIOGRAM COMPLETE: CPT

## 2022-06-30 RX ORDER — CIPROFLOXACIN HYDROCHLORIDE 250 MG/1
250 TABLET, FILM COATED ORAL
Qty: 10 | Refills: 0 | Status: DISCONTINUED | COMMUNITY
Start: 2020-03-23 | End: 2022-06-30

## 2022-06-30 RX ORDER — TRAMADOL HYDROCHLORIDE 50 MG/1
50 TABLET, COATED ORAL
Qty: 120 | Refills: 0 | Status: DISCONTINUED | COMMUNITY
Start: 2019-11-18 | End: 2022-06-30

## 2022-06-30 NOTE — REVIEW OF SYSTEMS
[SOB] : no shortness of breath [Dyspnea on exertion] : not dyspnea during exertion [Lower Ext Edema] : no extremity edema [Chest Discomfort] : no chest discomfort [Palpitations] : no palpitations [Leg Claudication] : no intermittent leg claudication [Syncope] : no syncope

## 2022-06-30 NOTE — DISCUSSION/SUMMARY
[EKG obtained to assist in diagnosis and management of assessed problem(s)] : EKG obtained to assist in diagnosis and management of assessed problem(s) [___ Month(s)] : in [unfilled] month(s) [FreeTextEntry1] : The patient is a 76-year-old gentleman history of bladder and renal cell cancer, carotid artery stenosis. hypertension, hyperlipidemia s/p MCA stroke,  LICA stent, who is hypertensive.\par #1 ALEX- continue DAPT s/p LICA stent \par #2 Htn- continue amlodipine, increase valsartan to 160/12.5mg\par #3 Lipids-continue atorvastatin 80mg\par #4 - s/p tx bladder and nephrectomy for renal cell, f/u Dr. Mann, will send f/u urine since yesterday had hematuria\par #5 CV- ECHO negative, nuclear stress negative, received  Pfizer vaccine, labs reviewed\par

## 2022-06-30 NOTE — HISTORY OF PRESENT ILLNESS
[FreeTextEntry1] : Mak had cystoscopy yesterday. Overall feels well but BP has been high at home over 140 regularly. Still active with no CP, palpitations or SOB.

## 2022-07-04 NOTE — H&P PST ADULT - RS GEN PE MLT RESP DETAILS PC
no fever/no chills/no anorexia breath sounds equal/clear to auscultation bilaterally/respirations non-labored

## 2022-09-01 ENCOUNTER — APPOINTMENT (OUTPATIENT)
Dept: INTERNAL MEDICINE | Facility: CLINIC | Age: 77
End: 2022-09-01

## 2022-09-01 VITALS
HEART RATE: 73 BPM | BODY MASS INDEX: 26.83 KG/M2 | DIASTOLIC BLOOD PRESSURE: 69 MMHG | TEMPERATURE: 97.9 F | OXYGEN SATURATION: 96 % | HEIGHT: 68 IN | WEIGHT: 177 LBS | SYSTOLIC BLOOD PRESSURE: 129 MMHG

## 2022-09-01 DIAGNOSIS — R07.81 PLEURODYNIA: ICD-10-CM

## 2022-09-01 DIAGNOSIS — M54.6 PAIN IN THORACIC SPINE: ICD-10-CM

## 2022-09-01 PROCEDURE — 36415 COLL VENOUS BLD VENIPUNCTURE: CPT

## 2022-09-01 PROCEDURE — 99214 OFFICE O/P EST MOD 30 MIN: CPT | Mod: 25

## 2022-09-03 NOTE — REVIEW OF SYSTEMS
[Fever] : no fever [Chest Pain] : no chest pain [Shortness Of Breath] : no shortness of breath [Joint Pain] : joint pain [Anxiety] : no anxiety

## 2022-09-03 NOTE — PLAN
[FreeTextEntry1] : Blood work ordered, Patient will be called for results\par Advised to take medications as directed\par will get plain films R ribs and thoracic spine\par

## 2022-09-03 NOTE — HEALTH RISK ASSESSMENT
[Former] : Former [Yes] : Yes [4 or more  times a week (4 pts)] : 4 or more  times a week (4 points) [1 or 2 (0 pts)] : 1 or 2 (0 points) [Never (0 pts)] : Never (0 points) [No] : In the past 12 months have you used drugs other than those required for medical reasons? No [No falls in past year] : Patient reported no falls in the past year [0] : 2) Feeling down, depressed, or hopeless: Not at all (0) [de-identified] : No [de-identified] : Cardiologist- Dr Haile  Urologist- Dr Mann  [Audit-CScore] : 4 [de-identified] : walking  [[id number]] : 0

## 2022-09-03 NOTE — PHYSICAL EXAM
[No CVA Tenderness] : no CVA  tenderness [No Spinal Tenderness] : no spinal tenderness [Normal] : affect was normal and insight and judgment were intact [de-identified] : Rt sub scapular mild tenderness

## 2022-09-03 NOTE — ASSESSMENT
[FreeTextEntry1] : Patient present with complaint of right sided sub scapula pain. \par Physical exam was insignificant.\par Patient was no acute distress

## 2022-09-03 NOTE — HISTORY OF PRESENT ILLNESS
[FreeTextEntry1] : Back pain [de-identified] : Mak Flo, 77 year-old male with PMHx of CAD, Bladder cancer since 2005, L kidney removal due to cancer comes with complaint  back pain in about a week. Patient says he has those symptoms every morning. The pain is located on right side sub scapular. Patient take Tramadol everyday for osteoarthritis in lower back, he says when he takes it, the back pain dissipates. \par Patient says he had blood in June. Patient says he is a former smoker. Patient is feeling well otherwise.

## 2022-09-11 LAB
ALBUMIN SERPL ELPH-MCNC: 4.5 G/DL
ALP BLD-CCNC: 100 U/L
ALT SERPL-CCNC: 17 U/L
ANION GAP SERPL CALC-SCNC: 15 MMOL/L
AST SERPL-CCNC: 22 U/L
BASOPHILS # BLD AUTO: 0.02 K/UL
BASOPHILS NFR BLD AUTO: 0.3 %
BILIRUB SERPL-MCNC: 0.4 MG/DL
BUN SERPL-MCNC: 32 MG/DL
CALCIUM SERPL-MCNC: 9.8 MG/DL
CHLORIDE SERPL-SCNC: 100 MMOL/L
CHOLEST SERPL-MCNC: 136 MG/DL
CO2 SERPL-SCNC: 23 MMOL/L
CREAT SERPL-MCNC: 1.97 MG/DL
EGFR: 34 ML/MIN/1.73M2
EOSINOPHIL # BLD AUTO: 0.35 K/UL
EOSINOPHIL NFR BLD AUTO: 5 %
GLUCOSE SERPL-MCNC: 91 MG/DL
HCT VFR BLD CALC: 36.3 %
HDLC SERPL-MCNC: 61 MG/DL
HGB BLD-MCNC: 12.2 G/DL
IMM GRANULOCYTES NFR BLD AUTO: 0.1 %
LDLC SERPL CALC-MCNC: 53 MG/DL
LYMPHOCYTES # BLD AUTO: 2.52 K/UL
LYMPHOCYTES NFR BLD AUTO: 35.7 %
MAN DIFF?: NORMAL
MCHC RBC-ENTMCNC: 30.7 PG
MCHC RBC-ENTMCNC: 33.6 GM/DL
MCV RBC AUTO: 91.4 FL
MONOCYTES # BLD AUTO: 0.72 K/UL
MONOCYTES NFR BLD AUTO: 10.2 %
NEUTROPHILS # BLD AUTO: 3.43 K/UL
NEUTROPHILS NFR BLD AUTO: 48.7 %
NONHDLC SERPL-MCNC: 76 MG/DL
PLATELET # BLD AUTO: 202 K/UL
POTASSIUM SERPL-SCNC: 3.9 MMOL/L
PROT SERPL-MCNC: 6.7 G/DL
RBC # BLD: 3.97 M/UL
RBC # FLD: 13.3 %
SODIUM SERPL-SCNC: 137 MMOL/L
T4 SERPL-MCNC: 6.1 UG/DL
TRIGL SERPL-MCNC: 113 MG/DL
TSH SERPL-ACNC: 1.42 UIU/ML
WBC # FLD AUTO: 7.05 K/UL

## 2022-09-19 ENCOUNTER — RX RENEWAL (OUTPATIENT)
Age: 77
End: 2022-09-19

## 2022-10-24 ENCOUNTER — RX RENEWAL (OUTPATIENT)
Age: 77
End: 2022-10-24

## 2022-12-15 ENCOUNTER — APPOINTMENT (OUTPATIENT)
Dept: UROLOGY | Facility: CLINIC | Age: 77
End: 2022-12-15

## 2022-12-15 ENCOUNTER — APPOINTMENT (OUTPATIENT)
Dept: CARDIOLOGY | Facility: CLINIC | Age: 77
End: 2022-12-15

## 2022-12-15 ENCOUNTER — OUTPATIENT (OUTPATIENT)
Dept: OUTPATIENT SERVICES | Facility: HOSPITAL | Age: 77
LOS: 1 days | End: 2022-12-15
Payer: MEDICARE

## 2022-12-15 VITALS
HEART RATE: 87 BPM | RESPIRATION RATE: 16 BRPM | OXYGEN SATURATION: 99 % | TEMPERATURE: 97.5 F | DIASTOLIC BLOOD PRESSURE: 68 MMHG | SYSTOLIC BLOOD PRESSURE: 163 MMHG

## 2022-12-15 VITALS
WEIGHT: 177 LBS | BODY MASS INDEX: 26.83 KG/M2 | HEART RATE: 83 BPM | SYSTOLIC BLOOD PRESSURE: 127 MMHG | DIASTOLIC BLOOD PRESSURE: 68 MMHG | HEIGHT: 68 IN | OXYGEN SATURATION: 99 %

## 2022-12-15 DIAGNOSIS — Z96.651 PRESENCE OF RIGHT ARTIFICIAL KNEE JOINT: Chronic | ICD-10-CM

## 2022-12-15 DIAGNOSIS — E83.119 HEMOCHROMATOSIS, UNSPECIFIED: ICD-10-CM

## 2022-12-15 DIAGNOSIS — Z86.79 PERSONAL HISTORY OF OTHER DISEASES OF THE CIRCULATORY SYSTEM: Chronic | ICD-10-CM

## 2022-12-15 DIAGNOSIS — Z90.5 ACQUIRED ABSENCE OF KIDNEY: Chronic | ICD-10-CM

## 2022-12-15 DIAGNOSIS — Z96.652 PRESENCE OF LEFT ARTIFICIAL KNEE JOINT: Chronic | ICD-10-CM

## 2022-12-15 DIAGNOSIS — Z98.49 CATARACT EXTRACTION STATUS, UNSPECIFIED EYE: Chronic | ICD-10-CM

## 2022-12-15 DIAGNOSIS — I65.29 OCCLUSION AND STENOSIS OF UNSPECIFIED CAROTID ARTERY: ICD-10-CM

## 2022-12-15 PROCEDURE — 52000 CYSTOURETHROSCOPY: CPT

## 2022-12-15 PROCEDURE — 99213 OFFICE O/P EST LOW 20 MIN: CPT | Mod: 25

## 2022-12-15 PROCEDURE — 88112 CYTOPATH CELL ENHANCE TECH: CPT | Mod: 26

## 2022-12-15 PROCEDURE — 93000 ELECTROCARDIOGRAM COMPLETE: CPT

## 2022-12-15 PROCEDURE — 99214 OFFICE O/P EST MOD 30 MIN: CPT

## 2022-12-15 RX ORDER — OMEPRAZOLE 40 MG/1
40 CAPSULE, DELAYED RELEASE ORAL
Qty: 90 | Refills: 1 | Status: DISCONTINUED | COMMUNITY
Start: 2022-06-30 | End: 2022-12-15

## 2022-12-16 DIAGNOSIS — N40.1 BENIGN PROSTATIC HYPERPLASIA WITH LOWER URINARY TRACT SYMPTOMS: ICD-10-CM

## 2022-12-16 DIAGNOSIS — C65.9 MALIGNANT NEOPLASM OF UNSPECIFIED RENAL PELVIS: ICD-10-CM

## 2022-12-16 DIAGNOSIS — C67.9 MALIGNANT NEOPLASM OF BLADDER, UNSPECIFIED: ICD-10-CM

## 2022-12-16 LAB
APPEARANCE: CLEAR
BACTERIA: NEGATIVE
BILIRUBIN URINE: NEGATIVE
BLOOD URINE: NEGATIVE
COLOR: YELLOW
GLUCOSE QUALITATIVE U: NEGATIVE
HYALINE CASTS: 0 /LPF
KETONES URINE: NEGATIVE
LEUKOCYTE ESTERASE URINE: NEGATIVE
MICROSCOPIC-UA: NORMAL
NITRITE URINE: NEGATIVE
PH URINE: 6
PROTEIN URINE: NEGATIVE
RED BLOOD CELLS URINE: 0 /HPF
SPECIFIC GRAVITY URINE: 1.02
SQUAMOUS EPITHELIAL CELLS: 1 /HPF
URINE CYTOLOGY: NORMAL
UROBILINOGEN URINE: NORMAL
WHITE BLOOD CELLS URINE: 1 /HPF

## 2022-12-18 NOTE — DISCUSSION/SUMMARY
[FreeTextEntry1] : The patient is a 76-year-old gentleman history of bladder and renal cell cancer, carotid artery stenosis. hypertension, hyperlipidemia s/p MCA stroke,  LICA stent, newly diagnosed with hemochromatosis.\par #1 ALEX- continue DAPT s/p LICA stent \par #2 Htn- c/w amlodipine, valsartan 160/12.5mg\par #3 Lipids-c/w atorvastatin 80mg\par #4 - s/p tx bladder and nephrectomy for renal cell, f/u Dr. Mann,\par #5 CV- ECHO negative, nuclear stress negative, received  Pfizer vaccine, labs reviewed\par #6 Heme- hemochromatosis treated with phlebotomy\par  [EKG obtained to assist in diagnosis and management of assessed problem(s)] : EKG obtained to assist in diagnosis and management of assessed problem(s)

## 2022-12-18 NOTE — HISTORY OF PRESENT ILLNESS
[FreeTextEntry1] : Mak stopped omeprazole. Dx hemochromatosis which he gets phlebotomy twice a year.  Stays active. Onlky here from [place] for his medical care. No CP, palpitations or SOB.

## 2022-12-20 ENCOUNTER — RX RENEWAL (OUTPATIENT)
Age: 77
End: 2022-12-20

## 2023-04-21 ENCOUNTER — RX RENEWAL (OUTPATIENT)
Age: 78
End: 2023-04-21

## 2023-06-22 ENCOUNTER — APPOINTMENT (OUTPATIENT)
Dept: ORTHOPEDIC SURGERY | Facility: CLINIC | Age: 78
End: 2023-06-22
Payer: MEDICARE

## 2023-06-22 PROCEDURE — 73110 X-RAY EXAM OF WRIST: CPT | Mod: LT

## 2023-06-22 PROCEDURE — 99203 OFFICE O/P NEW LOW 30 MIN: CPT | Mod: 25

## 2023-06-22 PROCEDURE — 20550 NJX 1 TENDON SHEATH/LIGAMENT: CPT | Mod: LT

## 2023-06-22 NOTE — ASSESSMENT
[FreeTextEntry1] : The condition was explained to the patient.\par Discussed risks and benefits of treatment options for tenosynovitis - activity modification, NSAID, splint, steroid injection, or surgery.\par \par Patient would like to proceed with CSI for DeQuervain's tenosynovitis.\par - Discussed risks, benefits, and alternatives as well as contents of injection. Risks include, but are not limited allergic reaction, flare reaction, injection site pain, bruising, numbness, increased blood sugar, skin discoloration, fat atrophy, tendon rupture, and infection. Risk of immune suppression and increased susceptibility to infection with steroid use. We discussed that too many injections may lead to weakening of the tendon and tendon rupture. Patient expressed understanding and would like to proceed with injection.\par - The skin over the LEFT wrist 1st extensor compartment just distal to the radial styloid was cleansed with alcohol and anesthetized with ethyl chloride. The 1st extensor compartment was injected with 3mg of celestone, 0.5cc of 1% lidocaine. Site was dressed with gauze and an ACE wrap. Patient tolerated the procedure well.\par - discussed that it may take up to 1 week for symptoms to improve after CSI.\par \par F/u PRN.\par

## 2023-06-22 NOTE — HISTORY OF PRESENT ILLNESS
[de-identified] : 6/22/23: 77yo RHD male (retired) presents for LEFT thumb pain x 2 months. Pain radiates up to radial mid-forearm. Pain with thumb extension and gripping.\par Denies injury.\par Using Tramadol for back pain.\par \par Hx: CAD. ALEX s/p stent. Hemochromatosis. HTN. HLD. bladder cancer s/p nephrectomy.\par Sx: TKA. [] : no [FreeTextEntry1] : Lt Thumb [FreeTextEntry5] : LURDES HERBERTOR saray [RHD] 78 year old male is here today c/o Lt thumb. Pt states pain started 2 months ago. Denies injury. No N/T. Pain from thumb through wrist. Pain with gripping.

## 2023-06-22 NOTE — IMAGING
[de-identified] : LEFT HAND\par nodular enlargement of DIPJ of all digits.\par skin intact. mild swelling of 1st ext compartment.\par TTP to 1st ext compartment.\par wrist ROM: good extension, flexion. good pronation, supination.\par good EPL, FPL. good finger extension, flex to full fist. good finger abduction and adduction. \par SILT to median, ulnar, radial distribution. \par palpable radial pulse, brisk cap refill all digits.\par no triggering.\par + Finkelstein's test.\par \par \par XRAYS OF LEFT WRIST: no acute displaced fracture or dislocation.

## 2023-06-28 ENCOUNTER — APPOINTMENT (OUTPATIENT)
Dept: CARDIOLOGY | Facility: CLINIC | Age: 78
End: 2023-06-28
Payer: MEDICARE

## 2023-06-28 ENCOUNTER — APPOINTMENT (OUTPATIENT)
Dept: UROLOGY | Facility: CLINIC | Age: 78
End: 2023-06-28
Payer: MEDICARE

## 2023-06-28 ENCOUNTER — NON-APPOINTMENT (OUTPATIENT)
Age: 78
End: 2023-06-28

## 2023-06-28 ENCOUNTER — OUTPATIENT (OUTPATIENT)
Dept: OUTPATIENT SERVICES | Facility: HOSPITAL | Age: 78
LOS: 1 days | End: 2023-06-28
Payer: MEDICARE

## 2023-06-28 VITALS
HEART RATE: 68 BPM | DIASTOLIC BLOOD PRESSURE: 76 MMHG | SYSTOLIC BLOOD PRESSURE: 167 MMHG | BODY MASS INDEX: 27.52 KG/M2 | RESPIRATION RATE: 14 BRPM | WEIGHT: 181 LBS | TEMPERATURE: 97.3 F | OXYGEN SATURATION: 98 %

## 2023-06-28 DIAGNOSIS — Z86.79 PERSONAL HISTORY OF OTHER DISEASES OF THE CIRCULATORY SYSTEM: Chronic | ICD-10-CM

## 2023-06-28 DIAGNOSIS — Z96.652 PRESENCE OF LEFT ARTIFICIAL KNEE JOINT: Chronic | ICD-10-CM

## 2023-06-28 DIAGNOSIS — Z96.651 PRESENCE OF RIGHT ARTIFICIAL KNEE JOINT: Chronic | ICD-10-CM

## 2023-06-28 DIAGNOSIS — Z98.49 CATARACT EXTRACTION STATUS, UNSPECIFIED EYE: Chronic | ICD-10-CM

## 2023-06-28 DIAGNOSIS — Z90.5 ACQUIRED ABSENCE OF KIDNEY: Chronic | ICD-10-CM

## 2023-06-28 DIAGNOSIS — C65.9 MALIGNANT NEOPLASM OF UNSPECIFIED RENAL PELVIS: ICD-10-CM

## 2023-06-28 DIAGNOSIS — R35.0 FREQUENCY OF MICTURITION: ICD-10-CM

## 2023-06-28 PROCEDURE — 99214 OFFICE O/P EST MOD 30 MIN: CPT | Mod: 25

## 2023-06-28 PROCEDURE — 52000 CYSTOURETHROSCOPY: CPT

## 2023-06-28 PROCEDURE — 88112 CYTOPATH CELL ENHANCE TECH: CPT | Mod: 26

## 2023-06-28 PROCEDURE — 99213 OFFICE O/P EST LOW 20 MIN: CPT

## 2023-06-28 PROCEDURE — 93000 ELECTROCARDIOGRAM COMPLETE: CPT

## 2023-06-28 RX ORDER — GLUCOSA SU 2KCL/CHONDROITIN SU 500-400 MG
100 CAPSULE ORAL
Refills: 0 | Status: ACTIVE | COMMUNITY
Start: 2019-06-12

## 2023-06-28 RX ORDER — HYDROCODONE POLISTIREX AND CHLORPHENIRAMINE POLISTIREX 10; 8 MG/5ML; MG/5ML
10-8 SUSPENSION, EXTENDED RELEASE ORAL AT BEDTIME
Qty: 1 | Refills: 0 | Status: DISCONTINUED | COMMUNITY
Start: 2019-12-12 | End: 2023-06-28

## 2023-06-28 NOTE — DISCUSSION/SUMMARY
[FreeTextEntry1] : The patient is a 78-year-old gentleman history of bladder and renal cell cancer, HTN, HLD, s/p MCA stroke,  LICA stent,  hemochromatosis awaiting colonoscopy.\par #1 ALEX- c/w DAPT s/p LICA stent \par #2 HTN- c/w amlodipine, valsartan 160/12.5mg\par #3 HLD-c/w atorvastatin 80mg\par #4 - s/p tx bladder and nephrectomy for renal cell, f/u Dr. Mann,\par #5 CV- ECHO negative, nuclear stress negative, received  Pfizer vaccine, labs reviewed\par #6 Heme- hemochromatosis, off phlebotomy, now Hb borderline low\par #7 GI- decrease aspirin to 81mg and may stop both for 5 days prior to colonoscopy. \par  [EKG obtained to assist in diagnosis and management of assessed problem(s)] : EKG obtained to assist in diagnosis and management of assessed problem(s)

## 2023-06-28 NOTE — HISTORY OF PRESENT ILLNESS
[FreeTextEntry1] : Mak was cleared by Dr. Mann today and awaiting Ct scan. Needs colonoscopy but hesitation re: DAPT. No bleeding but FH colon cancer and had polyps in the past.

## 2023-06-29 DIAGNOSIS — C67.9 MALIGNANT NEOPLASM OF BLADDER, UNSPECIFIED: ICD-10-CM

## 2023-06-29 DIAGNOSIS — N40.1 BENIGN PROSTATIC HYPERPLASIA WITH LOWER URINARY TRACT SYMPTOMS: ICD-10-CM

## 2023-06-29 DIAGNOSIS — C65.9 MALIGNANT NEOPLASM OF UNSPECIFIED RENAL PELVIS: ICD-10-CM

## 2023-06-29 LAB
APPEARANCE: CLEAR
BACTERIA: NEGATIVE /HPF
BILIRUBIN URINE: NEGATIVE
BLOOD URINE: NEGATIVE
CAST: 0 /LPF
COLOR: YELLOW
EPITHELIAL CELLS: 2 /HPF
GLUCOSE QUALITATIVE U: NEGATIVE MG/DL
KETONES URINE: NEGATIVE MG/DL
LEUKOCYTE ESTERASE URINE: NEGATIVE
MICROSCOPIC-UA: NORMAL
NITRITE URINE: NEGATIVE
PH URINE: 7
PROTEIN URINE: NEGATIVE MG/DL
PSA FREE FLD-MCNC: 11 %
PSA FREE SERPL-MCNC: 0.28 NG/ML
PSA SERPL-MCNC: 2.57 NG/ML
RED BLOOD CELLS URINE: 0 /HPF
SPECIFIC GRAVITY URINE: 1.02
URINE CYTOLOGY: NORMAL
UROBILINOGEN URINE: 0.2 MG/DL
WHITE BLOOD CELLS URINE: 0 /HPF

## 2023-07-02 ENCOUNTER — APPOINTMENT (OUTPATIENT)
Dept: CT IMAGING | Facility: IMAGING CENTER | Age: 78
End: 2023-07-02
Payer: MEDICARE

## 2023-07-02 ENCOUNTER — OUTPATIENT (OUTPATIENT)
Dept: OUTPATIENT SERVICES | Facility: HOSPITAL | Age: 78
LOS: 1 days | End: 2023-07-02
Payer: MEDICARE

## 2023-07-02 DIAGNOSIS — Z96.651 PRESENCE OF RIGHT ARTIFICIAL KNEE JOINT: Chronic | ICD-10-CM

## 2023-07-02 DIAGNOSIS — Z90.5 ACQUIRED ABSENCE OF KIDNEY: Chronic | ICD-10-CM

## 2023-07-02 DIAGNOSIS — N40.1 BENIGN PROSTATIC HYPERPLASIA WITH LOWER URINARY TRACT SYMPTOMS: ICD-10-CM

## 2023-07-02 DIAGNOSIS — Z98.49 CATARACT EXTRACTION STATUS, UNSPECIFIED EYE: Chronic | ICD-10-CM

## 2023-07-02 DIAGNOSIS — Z96.652 PRESENCE OF LEFT ARTIFICIAL KNEE JOINT: Chronic | ICD-10-CM

## 2023-07-02 DIAGNOSIS — Z86.79 PERSONAL HISTORY OF OTHER DISEASES OF THE CIRCULATORY SYSTEM: Chronic | ICD-10-CM

## 2023-07-02 PROCEDURE — 74178 CT ABD&PLV WO CNTR FLWD CNTR: CPT | Mod: 26,MH

## 2023-07-02 PROCEDURE — 74178 CT ABD&PLV WO CNTR FLWD CNTR: CPT

## 2023-07-20 ENCOUNTER — RX RENEWAL (OUTPATIENT)
Age: 78
End: 2023-07-20

## 2023-08-07 ENCOUNTER — APPOINTMENT (OUTPATIENT)
Dept: CARDIOLOGY | Facility: CLINIC | Age: 78
End: 2023-08-07
Payer: MEDICARE

## 2023-08-07 PROCEDURE — 99213 OFFICE O/P EST LOW 20 MIN: CPT | Mod: 95

## 2023-08-07 PROCEDURE — 99203 OFFICE O/P NEW LOW 30 MIN: CPT | Mod: 95

## 2023-08-07 NOTE — DISCUSSION/SUMMARY
[FreeTextEntry1] : The patient is a 78-year-old gentleman history of bladder and renal cell cancer, HTN, HLD, s/p MCA stroke,  LICA stent, hemochromatosis awaiting colonoscopy. #1 ALEX- c/w DAPT s/p LICA stent  #2 HTN- c/w amlodipine, valsartan 160/12.5mg #3 HLD-c/w atorvastatin 80mg #4 - s/p tx bladder and nephrectomy for renal cell, f/u Dr. Mann, #5 CV- ECHO negative, nuclear stress negative, received Pfizer vaccine #6 Heme- hemochromatosis,  #7 GI- There are no cardiac contraindications to colonoscopy off aspirin and clopidogrel for 5 days prior to procedure.

## 2023-08-07 NOTE — HISTORY OF PRESENT ILLNESS
[Home] : at home, [unfilled] , at the time of the visit. [Medical Office: ([facility])___] : at the medical office located in  [Verbal consent obtained from patient] : the patient, [unfilled] [FreeTextEntry1] : Mak had MRI with contrast. Now planning on colonoscopy but needs clearance. CT scan abdomen and pelvis with calcification of heart. No chest pain., palpitations or sob . Remains active.

## 2023-10-02 ENCOUNTER — APPOINTMENT (OUTPATIENT)
Dept: ORTHOPEDIC SURGERY | Facility: CLINIC | Age: 78
End: 2023-10-02
Payer: MEDICARE

## 2023-10-02 DIAGNOSIS — M65.4 RADIAL STYLOID TENOSYNOVITIS [DE QUERVAIN]: ICD-10-CM

## 2023-10-02 PROCEDURE — 20550 NJX 1 TENDON SHEATH/LIGAMENT: CPT | Mod: LT

## 2023-10-02 PROCEDURE — 99213 OFFICE O/P EST LOW 20 MIN: CPT | Mod: 25

## 2023-11-01 ENCOUNTER — NON-APPOINTMENT (OUTPATIENT)
Age: 78
End: 2023-11-01

## 2023-11-01 RX ORDER — TAMSULOSIN HYDROCHLORIDE 0.4 MG/1
0.4 CAPSULE ORAL
Qty: 90 | Refills: 2 | Status: ACTIVE | COMMUNITY
Start: 2020-07-28 | End: 1900-01-01

## 2023-12-14 ENCOUNTER — APPOINTMENT (OUTPATIENT)
Dept: UROLOGY | Facility: CLINIC | Age: 78
End: 2023-12-14
Payer: MEDICARE

## 2023-12-14 ENCOUNTER — OUTPATIENT (OUTPATIENT)
Dept: OUTPATIENT SERVICES | Facility: HOSPITAL | Age: 78
LOS: 1 days | End: 2023-12-14
Payer: MEDICARE

## 2023-12-14 VITALS
SYSTOLIC BLOOD PRESSURE: 171 MMHG | HEART RATE: 72 BPM | DIASTOLIC BLOOD PRESSURE: 75 MMHG | TEMPERATURE: 97.5 F | RESPIRATION RATE: 16 BRPM | OXYGEN SATURATION: 98 %

## 2023-12-14 DIAGNOSIS — R35.0 FREQUENCY OF MICTURITION: ICD-10-CM

## 2023-12-14 DIAGNOSIS — Z90.5 ACQUIRED ABSENCE OF KIDNEY: Chronic | ICD-10-CM

## 2023-12-14 DIAGNOSIS — C67.9 MALIGNANT NEOPLASM OF BLADDER, UNSPECIFIED: ICD-10-CM

## 2023-12-14 DIAGNOSIS — Z86.79 PERSONAL HISTORY OF OTHER DISEASES OF THE CIRCULATORY SYSTEM: Chronic | ICD-10-CM

## 2023-12-14 DIAGNOSIS — Z96.652 PRESENCE OF LEFT ARTIFICIAL KNEE JOINT: Chronic | ICD-10-CM

## 2023-12-14 DIAGNOSIS — Z98.49 CATARACT EXTRACTION STATUS, UNSPECIFIED EYE: Chronic | ICD-10-CM

## 2023-12-14 DIAGNOSIS — N13.8 BENIGN PROSTATIC HYPERPLASIA WITH LOWER URINARY TRACT SYMPMS: ICD-10-CM

## 2023-12-14 DIAGNOSIS — Z96.651 PRESENCE OF RIGHT ARTIFICIAL KNEE JOINT: Chronic | ICD-10-CM

## 2023-12-14 DIAGNOSIS — N40.1 BENIGN PROSTATIC HYPERPLASIA WITH LOWER URINARY TRACT SYMPMS: ICD-10-CM

## 2023-12-14 PROCEDURE — 52000 CYSTOURETHROSCOPY: CPT

## 2023-12-14 PROCEDURE — 99214 OFFICE O/P EST MOD 30 MIN: CPT | Mod: 57,25

## 2023-12-15 LAB
APPEARANCE: ABNORMAL
BACTERIA: NEGATIVE /HPF
BILIRUBIN URINE: NEGATIVE
BLOOD URINE: NEGATIVE
CAST: 0 /LPF
COLOR: YELLOW
EPITHELIAL CELLS: 2 /HPF
GLUCOSE QUALITATIVE U: NEGATIVE MG/DL
KETONES URINE: NEGATIVE MG/DL
LEUKOCYTE ESTERASE URINE: NEGATIVE
MICROSCOPIC-UA: NORMAL
NITRITE URINE: NEGATIVE
PH URINE: 6
PROTEIN URINE: NEGATIVE MG/DL
RED BLOOD CELLS URINE: 0 /HPF
SPECIFIC GRAVITY URINE: 1.02
UROBILINOGEN URINE: 0.2 MG/DL
WHITE BLOOD CELLS URINE: 0 /HPF

## 2023-12-16 LAB — URINE CYTOLOGY: NORMAL

## 2023-12-20 ENCOUNTER — NON-APPOINTMENT (OUTPATIENT)
Age: 78
End: 2023-12-20

## 2023-12-20 ENCOUNTER — APPOINTMENT (OUTPATIENT)
Dept: CARDIOLOGY | Facility: CLINIC | Age: 78
End: 2023-12-20
Payer: MEDICARE

## 2023-12-20 VITALS
TEMPERATURE: 97.2 F | SYSTOLIC BLOOD PRESSURE: 145 MMHG | BODY MASS INDEX: 27.52 KG/M2 | DIASTOLIC BLOOD PRESSURE: 70 MMHG | HEART RATE: 83 BPM | WEIGHT: 181 LBS | RESPIRATION RATE: 14 BRPM | OXYGEN SATURATION: 98 %

## 2023-12-20 DIAGNOSIS — C67.9 MALIGNANT NEOPLASM OF BLADDER, UNSPECIFIED: ICD-10-CM

## 2023-12-20 DIAGNOSIS — N40.1 BENIGN PROSTATIC HYPERPLASIA WITH LOWER URINARY TRACT SYMPTOMS: ICD-10-CM

## 2023-12-20 PROCEDURE — 99214 OFFICE O/P EST MOD 30 MIN: CPT

## 2023-12-20 PROCEDURE — 93000 ELECTROCARDIOGRAM COMPLETE: CPT

## 2023-12-20 NOTE — DISCUSSION/SUMMARY
[EKG obtained to assist in diagnosis and management of assessed problem(s)] : EKG obtained to assist in diagnosis and management of assessed problem(s) [FreeTextEntry1] : The patient is a 78-year-old gentleman history of bladder and renal cell cancer, HTN, HLD, s/p MCA stroke,  LICA stent, hemochromatosis awaiting colonoscopy. #1 ALEX- c/w DAPT s/p LICA stent  #2 HTN- c/w amlodipine, valsartan 160/12.5mg #3 HLD-c/w atorvastatin 80mg #4 - s/p tx bladder and nephrectomy for renal cell, f/u Dr. Mann, #5 CV- ECHO negative, nuclear stress negative, received Pfizer vaccine #6 Heme- hemochromatosis,  #7 GI- There are no cardiac contraindications to colonoscopy off aspirin and clopidogrel for 5 days prior to procedure.

## 2023-12-20 NOTE — HISTORY OF PRESENT ILLNESS
[FreeTextEntry1] : Mak did not have his colonoscopy because he needed neuro clearance. They want to do it in [facility]. Cleared by neuro off aspirin and clopidogrel. BP slightly up recently . No new symptoms

## 2024-01-08 ENCOUNTER — APPOINTMENT (OUTPATIENT)
Dept: CARDIOLOGY | Facility: CLINIC | Age: 79
End: 2024-01-08
Payer: MEDICARE

## 2024-01-08 DIAGNOSIS — E78.5 HYPERLIPIDEMIA, UNSPECIFIED: ICD-10-CM

## 2024-01-08 DIAGNOSIS — I10 ESSENTIAL (PRIMARY) HYPERTENSION: ICD-10-CM

## 2024-01-08 DIAGNOSIS — I25.10 ATHEROSCLEROTIC HEART DISEASE OF NATIVE CORONARY ARTERY W/OUT ANGINA PECTORIS: ICD-10-CM

## 2024-01-08 PROCEDURE — 99213 OFFICE O/P EST LOW 20 MIN: CPT

## 2024-01-08 NOTE — HISTORY OF PRESENT ILLNESS
[Home] : at home, [unfilled] , at the time of the visit. [Medical Office: ([facility])___] : at the medical office located in  [Verbal consent obtained from patient] : the patient, [unfilled] [FreeTextEntry1] : Mak had one poly on colonoscopy and that was negative. He feels generally well with no cardiac symptoms. Now left wrist ligament issues s/p 2 injections now needs surgery.  Surgery: Left wrist Date: 1/12/24 Surgeon: Dr. Alvarado ([facility])

## 2024-01-08 NOTE — DISCUSSION/SUMMARY
[FreeTextEntry1] : The patient is a 78-year-old gentleman history of bladder and renal cell cancer, HTN, HLD, s/p MCA stroke,  LICA stent, hemochromatosis awaiting left wrist surgery #1 ALEX- c/w DAPT s/p LICA stent  #2 HTN- c/w amlodipine, valsartan 160/12.5mg #3 HLD-c/w atorvastatin 80mg #4 - s/p tx bladder and nephrectomy for renal cell, f/u Dr. Mann, #5 CV- ECHO negative, nuclear stress negative, received Pfizer vaccine #6 Heme- hemochromatosis,  #7 Ortho- There are no cardiac contraindications to left wrist surgery off DAPT from today on til after surgery.

## 2024-01-31 RX ORDER — FINASTERIDE 5 MG/1
5 TABLET, FILM COATED ORAL
Qty: 90 | Refills: 2 | Status: ACTIVE | COMMUNITY
Start: 2021-12-16 | End: 1900-01-01

## 2024-04-15 ENCOUNTER — RX RENEWAL (OUTPATIENT)
Age: 79
End: 2024-04-15

## 2024-04-15 RX ORDER — ATORVASTATIN CALCIUM 80 MG/1
80 TABLET, FILM COATED ORAL
Qty: 90 | Refills: 1 | Status: ACTIVE | COMMUNITY
Start: 2019-05-30 | End: 1900-01-01

## 2024-04-15 RX ORDER — AMLODIPINE BESYLATE 5 MG/1
5 TABLET ORAL
Qty: 90 | Refills: 1 | Status: ACTIVE | COMMUNITY
Start: 2019-07-05 | End: 1900-01-01

## 2024-05-10 ENCOUNTER — RX RENEWAL (OUTPATIENT)
Age: 79
End: 2024-05-10

## 2024-05-14 RX ORDER — CLOPIDOGREL BISULFATE 75 MG/1
75 TABLET, FILM COATED ORAL
Qty: 90 | Refills: 3 | Status: ACTIVE | COMMUNITY
Start: 2019-06-12 | End: 1900-01-01

## 2024-06-19 ENCOUNTER — APPOINTMENT (OUTPATIENT)
Dept: CARDIOLOGY | Facility: CLINIC | Age: 79
End: 2024-06-19
Payer: MEDICARE

## 2024-06-19 DIAGNOSIS — I49.3 VENTRICULAR PREMATURE DEPOLARIZATION: ICD-10-CM

## 2024-06-19 PROCEDURE — 99213 OFFICE O/P EST LOW 20 MIN: CPT

## 2024-06-19 NOTE — ED PROVIDER NOTE - CRANIAL NERVE AND PUPILLARY EXAM
History of, now with acute nausea and vomiting in setting of alcohol withdrawal  No reported hematemesis  Zofran prn  NPO sips with clears, did not tolerate any more this AM, advance diet as tolerated  Continue home Protonix 40 mg QD   extra-ocular movements intact/cranial nerves 2-12 intact/tongue is midline

## 2024-06-19 NOTE — HISTORY OF PRESENT ILLNESS
[Home] : at home, [unfilled] , at the time of the visit. [Medical Office: ([facility])___] : at the medical office located in  [Verbal consent obtained from patient] : the patient, [unfilled] [FreeTextEntry1] : Mak got very dizzy when playing golf. He went to urgent care and they said RBBB. Sent ECG PVC now. He got event monitor. He wants him to take beta blocker and magnesium.

## 2024-06-19 NOTE — DISCUSSION/SUMMARY
[FreeTextEntry1] : The patient is a 78-year-old gentleman history of bladder and renal cell cancer, HTN, HLD, s/p MCA stroke, LICA stent, hemochromatosis , PVCs and near syncope. #1 ALEX- c/w DAPT s/p LICA stent  #2 HTN- c/w amlodipine, valsartan 160/12.5mg #3 HLD-c/w atorvastatin 80mg #4 - s/p tx bladder and nephrectomy for renal cell, f/u Dr. Mann, #5 CV- ECHO negative, nuclear stress negative, received Pfizer vaccine, 6% PVC and one triplet , start toprol 12.5mg, repeat ECHO in August in [place]. #6 Heme- hemochromatosis,  #7 Ortho- There are no cardiac contraindications to left wrist surgery off DAPT from today on til after surgery.

## 2024-07-10 ENCOUNTER — APPOINTMENT (OUTPATIENT)
Dept: UROLOGY | Facility: CLINIC | Age: 79
End: 2024-07-10
Payer: MEDICARE

## 2024-07-10 ENCOUNTER — OUTPATIENT (OUTPATIENT)
Dept: OUTPATIENT SERVICES | Facility: HOSPITAL | Age: 79
LOS: 1 days | End: 2024-07-10
Payer: MEDICARE

## 2024-07-10 ENCOUNTER — APPOINTMENT (OUTPATIENT)
Dept: CARDIOLOGY | Facility: CLINIC | Age: 79
End: 2024-07-10
Payer: MEDICARE

## 2024-07-10 ENCOUNTER — NON-APPOINTMENT (OUTPATIENT)
Age: 79
End: 2024-07-10

## 2024-07-10 VITALS
WEIGHT: 185 LBS | HEIGHT: 68 IN | DIASTOLIC BLOOD PRESSURE: 71 MMHG | OXYGEN SATURATION: 98 % | BODY MASS INDEX: 28.04 KG/M2 | SYSTOLIC BLOOD PRESSURE: 149 MMHG | HEART RATE: 56 BPM | RESPIRATION RATE: 19 BRPM

## 2024-07-10 DIAGNOSIS — C67.9 MALIGNANT NEOPLASM OF BLADDER, UNSPECIFIED: ICD-10-CM

## 2024-07-10 DIAGNOSIS — I10 ESSENTIAL (PRIMARY) HYPERTENSION: ICD-10-CM

## 2024-07-10 DIAGNOSIS — R06.09 OTHER FORMS OF DYSPNEA: ICD-10-CM

## 2024-07-10 DIAGNOSIS — Z98.49 CATARACT EXTRACTION STATUS, UNSPECIFIED EYE: Chronic | ICD-10-CM

## 2024-07-10 DIAGNOSIS — I25.10 ATHEROSCLEROTIC HEART DISEASE OF NATIVE CORONARY ARTERY W/OUT ANGINA PECTORIS: ICD-10-CM

## 2024-07-10 DIAGNOSIS — R35.0 FREQUENCY OF MICTURITION: ICD-10-CM

## 2024-07-10 DIAGNOSIS — Z86.79 PERSONAL HISTORY OF OTHER DISEASES OF THE CIRCULATORY SYSTEM: Chronic | ICD-10-CM

## 2024-07-10 DIAGNOSIS — Z90.5 ACQUIRED ABSENCE OF KIDNEY: Chronic | ICD-10-CM

## 2024-07-10 DIAGNOSIS — Z96.652 PRESENCE OF LEFT ARTIFICIAL KNEE JOINT: Chronic | ICD-10-CM

## 2024-07-10 DIAGNOSIS — Z96.651 PRESENCE OF RIGHT ARTIFICIAL KNEE JOINT: Chronic | ICD-10-CM

## 2024-07-10 LAB
APPEARANCE: CLEAR
BACTERIA: NEGATIVE /HPF
BLOOD URINE: NEGATIVE
CAST: 0 /LPF
COLOR: YELLOW
EPITHELIAL CELLS: 2 /HPF
GLUCOSE QUALITATIVE U: NEGATIVE MG/DL
KETONES URINE: NEGATIVE MG/DL
LEUKOCYTE ESTERASE URINE: NEGATIVE
MICROSCOPIC-UA: NORMAL
NITRITE URINE: NEGATIVE
PH URINE: 6.5
PROTEIN URINE: NEGATIVE MG/DL
RED BLOOD CELLS URINE: 0 /HPF
SPECIFIC GRAVITY URINE: 1.02
UROBILINOGEN URINE: 1 MG/DL
WHITE BLOOD CELLS URINE: 0 /HPF

## 2024-07-10 PROCEDURE — 99214 OFFICE O/P EST MOD 30 MIN: CPT

## 2024-07-10 PROCEDURE — 52000 CYSTOURETHROSCOPY: CPT

## 2024-07-10 PROCEDURE — 99214 OFFICE O/P EST MOD 30 MIN: CPT | Mod: 25

## 2024-07-10 PROCEDURE — G2211 COMPLEX E/M VISIT ADD ON: CPT

## 2024-07-10 PROCEDURE — 93000 ELECTROCARDIOGRAM COMPLETE: CPT

## 2024-07-11 DIAGNOSIS — C67.9 MALIGNANT NEOPLASM OF BLADDER, UNSPECIFIED: ICD-10-CM

## 2024-07-11 LAB
PSA FREE FLD-MCNC: 13 %
PSA FREE SERPL-MCNC: 0.29 NG/ML
PSA SERPL-MCNC: 2.3 NG/ML
URINE CYTOLOGY: NORMAL

## 2024-07-15 ENCOUNTER — RX RENEWAL (OUTPATIENT)
Age: 79
End: 2024-07-15

## 2024-08-05 ENCOUNTER — RX RENEWAL (OUTPATIENT)
Age: 79
End: 2024-08-05

## 2024-10-14 ENCOUNTER — RX RENEWAL (OUTPATIENT)
Age: 79
End: 2024-10-14

## 2024-10-24 ENCOUNTER — RX RENEWAL (OUTPATIENT)
Age: 79
End: 2024-10-24

## 2024-12-12 ENCOUNTER — OUTPATIENT (OUTPATIENT)
Dept: OUTPATIENT SERVICES | Facility: HOSPITAL | Age: 79
LOS: 1 days | End: 2024-12-12
Payer: MEDICARE

## 2024-12-12 ENCOUNTER — APPOINTMENT (OUTPATIENT)
Dept: CARDIOLOGY | Facility: CLINIC | Age: 79
End: 2024-12-12
Payer: MEDICARE

## 2024-12-12 ENCOUNTER — APPOINTMENT (OUTPATIENT)
Dept: UROLOGY | Facility: CLINIC | Age: 79
End: 2024-12-12

## 2024-12-12 ENCOUNTER — NON-APPOINTMENT (OUTPATIENT)
Age: 79
End: 2024-12-12

## 2024-12-12 VITALS
BODY MASS INDEX: 27.89 KG/M2 | SYSTOLIC BLOOD PRESSURE: 180 MMHG | OXYGEN SATURATION: 98 % | HEIGHT: 68 IN | WEIGHT: 184 LBS | TEMPERATURE: 97.7 F | HEART RATE: 62 BPM | DIASTOLIC BLOOD PRESSURE: 80 MMHG | RESPIRATION RATE: 19 BRPM

## 2024-12-12 DIAGNOSIS — E78.5 HYPERLIPIDEMIA, UNSPECIFIED: ICD-10-CM

## 2024-12-12 DIAGNOSIS — I25.10 ATHEROSCLEROTIC HEART DISEASE OF NATIVE CORONARY ARTERY W/OUT ANGINA PECTORIS: ICD-10-CM

## 2024-12-12 DIAGNOSIS — I10 ESSENTIAL (PRIMARY) HYPERTENSION: ICD-10-CM

## 2024-12-12 DIAGNOSIS — Z86.79 PERSONAL HISTORY OF OTHER DISEASES OF THE CIRCULATORY SYSTEM: Chronic | ICD-10-CM

## 2024-12-12 DIAGNOSIS — Z96.651 PRESENCE OF RIGHT ARTIFICIAL KNEE JOINT: Chronic | ICD-10-CM

## 2024-12-12 DIAGNOSIS — C65.9 MALIGNANT NEOPLASM OF UNSPECIFIED RENAL PELVIS: ICD-10-CM

## 2024-12-12 DIAGNOSIS — R35.0 FREQUENCY OF MICTURITION: ICD-10-CM

## 2024-12-12 DIAGNOSIS — Z90.5 ACQUIRED ABSENCE OF KIDNEY: Chronic | ICD-10-CM

## 2024-12-12 DIAGNOSIS — Z98.49 CATARACT EXTRACTION STATUS, UNSPECIFIED EYE: Chronic | ICD-10-CM

## 2024-12-12 DIAGNOSIS — Z96.652 PRESENCE OF LEFT ARTIFICIAL KNEE JOINT: Chronic | ICD-10-CM

## 2024-12-12 DIAGNOSIS — I49.3 VENTRICULAR PREMATURE DEPOLARIZATION: ICD-10-CM

## 2024-12-12 PROCEDURE — 99214 OFFICE O/P EST MOD 30 MIN: CPT | Mod: 25

## 2024-12-12 PROCEDURE — 99214 OFFICE O/P EST MOD 30 MIN: CPT

## 2024-12-12 PROCEDURE — G2211 COMPLEX E/M VISIT ADD ON: CPT

## 2024-12-12 PROCEDURE — 52000 CYSTOURETHROSCOPY: CPT

## 2024-12-12 PROCEDURE — 93000 ELECTROCARDIOGRAM COMPLETE: CPT

## 2024-12-13 DIAGNOSIS — C65.9 MALIGNANT NEOPLASM OF UNSPECIFIED RENAL PELVIS: ICD-10-CM

## 2024-12-13 LAB
APPEARANCE: CLEAR
BACTERIA UR CULT: NORMAL
BACTERIA: NEGATIVE /HPF
BILIRUBIN URINE: NEGATIVE
BLOOD URINE: ABNORMAL
CAST: 0 /LPF
COLOR: YELLOW
EPITHELIAL CELLS: 1 /HPF
GLUCOSE QUALITATIVE U: NEGATIVE MG/DL
KETONES URINE: NEGATIVE MG/DL
LEUKOCYTE ESTERASE URINE: NEGATIVE
MICROSCOPIC-UA: NORMAL
NITRITE URINE: NEGATIVE
PH URINE: 6.5
PROTEIN URINE: NEGATIVE MG/DL
RED BLOOD CELLS URINE: 1 /HPF
SPECIFIC GRAVITY URINE: 1.01
URINE CYTOLOGY: NORMAL
UROBILINOGEN URINE: 0.2 MG/DL
WHITE BLOOD CELLS URINE: 1 /HPF

## 2025-01-29 ENCOUNTER — NON-APPOINTMENT (OUTPATIENT)
Age: 80
End: 2025-01-29

## 2025-05-05 ENCOUNTER — RX RENEWAL (OUTPATIENT)
Age: 80
End: 2025-05-05

## 2025-07-08 ENCOUNTER — NON-APPOINTMENT (OUTPATIENT)
Age: 80
End: 2025-07-08

## 2025-07-09 ENCOUNTER — APPOINTMENT (OUTPATIENT)
Dept: UROLOGY | Facility: CLINIC | Age: 80
End: 2025-07-09
Payer: MEDICARE

## 2025-07-09 ENCOUNTER — OUTPATIENT (OUTPATIENT)
Dept: OUTPATIENT SERVICES | Facility: HOSPITAL | Age: 80
LOS: 1 days | End: 2025-07-09
Payer: MEDICARE

## 2025-07-09 ENCOUNTER — RX RENEWAL (OUTPATIENT)
Age: 80
End: 2025-07-09

## 2025-07-09 VITALS — HEART RATE: 64 BPM | SYSTOLIC BLOOD PRESSURE: 154 MMHG | DIASTOLIC BLOOD PRESSURE: 73 MMHG | OXYGEN SATURATION: 100 %

## 2025-07-09 DIAGNOSIS — Z90.5 ACQUIRED ABSENCE OF KIDNEY: Chronic | ICD-10-CM

## 2025-07-09 DIAGNOSIS — Z98.49 CATARACT EXTRACTION STATUS, UNSPECIFIED EYE: Chronic | ICD-10-CM

## 2025-07-09 DIAGNOSIS — R35.0 FREQUENCY OF MICTURITION: ICD-10-CM

## 2025-07-09 DIAGNOSIS — Z86.79 PERSONAL HISTORY OF OTHER DISEASES OF THE CIRCULATORY SYSTEM: Chronic | ICD-10-CM

## 2025-07-09 DIAGNOSIS — Z96.651 PRESENCE OF RIGHT ARTIFICIAL KNEE JOINT: Chronic | ICD-10-CM

## 2025-07-09 DIAGNOSIS — Z96.652 PRESENCE OF LEFT ARTIFICIAL KNEE JOINT: Chronic | ICD-10-CM

## 2025-07-09 LAB
APPEARANCE: CLEAR
BACTERIA: NEGATIVE /HPF
BILIRUBIN URINE: NEGATIVE
BLOOD URINE: NEGATIVE
CAST: 0 /LPF
COLOR: YELLOW
EPITHELIAL CELLS: 2 /HPF
GLUCOSE QUALITATIVE U: NEGATIVE MG/DL
KETONES URINE: NEGATIVE MG/DL
LEUKOCYTE ESTERASE URINE: NEGATIVE
MICROSCOPIC-UA: NORMAL
NITRITE URINE: NEGATIVE
PH URINE: 7.5
PROTEIN URINE: NEGATIVE MG/DL
RED BLOOD CELLS URINE: 0 /HPF
SPECIFIC GRAVITY URINE: 1.01
UROBILINOGEN URINE: 0.2 MG/DL
WHITE BLOOD CELLS URINE: 0 /HPF

## 2025-07-09 PROCEDURE — 52000 CYSTOURETHROSCOPY: CPT

## 2025-07-09 PROCEDURE — ZZZZZ: CPT

## 2025-07-10 ENCOUNTER — APPOINTMENT (OUTPATIENT)
Dept: CARDIOLOGY | Facility: CLINIC | Age: 80
End: 2025-07-10
Payer: MEDICARE

## 2025-07-10 ENCOUNTER — NON-APPOINTMENT (OUTPATIENT)
Age: 80
End: 2025-07-10

## 2025-07-10 VITALS
BODY MASS INDEX: 27.43 KG/M2 | TEMPERATURE: 97.3 F | WEIGHT: 181 LBS | HEART RATE: 76 BPM | SYSTOLIC BLOOD PRESSURE: 131 MMHG | OXYGEN SATURATION: 99 % | RESPIRATION RATE: 18 BRPM | HEIGHT: 68 IN | DIASTOLIC BLOOD PRESSURE: 72 MMHG

## 2025-07-10 DIAGNOSIS — C67.9 MALIGNANT NEOPLASM OF BLADDER, UNSPECIFIED: ICD-10-CM

## 2025-07-10 LAB
PSA FREE FLD-MCNC: 16 %
PSA FREE SERPL-MCNC: 0.31 NG/ML
PSA SERPL-MCNC: 1.98 NG/ML
URINE CYTOLOGY: NORMAL

## 2025-07-10 PROCEDURE — 93000 ELECTROCARDIOGRAM COMPLETE: CPT

## 2025-07-10 PROCEDURE — 99214 OFFICE O/P EST MOD 30 MIN: CPT

## 2025-07-11 LAB — BACTERIA UR CULT: NORMAL

## 2025-08-09 ENCOUNTER — RX RENEWAL (OUTPATIENT)
Age: 80
End: 2025-08-09